# Patient Record
Sex: MALE | Race: WHITE | NOT HISPANIC OR LATINO | Employment: FULL TIME | URBAN - METROPOLITAN AREA
[De-identification: names, ages, dates, MRNs, and addresses within clinical notes are randomized per-mention and may not be internally consistent; named-entity substitution may affect disease eponyms.]

---

## 2017-04-27 ENCOUNTER — MYC MEDICAL ADVICE (OUTPATIENT)
Dept: PEDIATRICS | Facility: CLINIC | Age: 47
End: 2017-04-27

## 2017-04-27 NOTE — TELEPHONE ENCOUNTER
Appointment advised to evaluate cough.  Patient has not been seen in clinic in over one year-Feb/2016.  NEIDA Taylor RN     Potassium severely low at 2.7.  Likely from the vomiting.  Repleted with K-rider.  Will recheck tomorrow.

## 2017-08-03 ENCOUNTER — OFFICE VISIT (OUTPATIENT)
Dept: PEDIATRICS | Facility: CLINIC | Age: 47
End: 2017-08-03
Payer: COMMERCIAL

## 2017-08-03 VITALS
WEIGHT: 202 LBS | TEMPERATURE: 98 F | HEART RATE: 64 BPM | BODY MASS INDEX: 25.93 KG/M2 | SYSTOLIC BLOOD PRESSURE: 114 MMHG | HEIGHT: 74 IN | DIASTOLIC BLOOD PRESSURE: 72 MMHG

## 2017-08-03 DIAGNOSIS — C44.91: ICD-10-CM

## 2017-08-03 DIAGNOSIS — N50.819 ORCHIALGIA: ICD-10-CM

## 2017-08-03 DIAGNOSIS — R51.9 FACIAL PAIN: Primary | ICD-10-CM

## 2017-08-03 PROCEDURE — 99214 OFFICE O/P EST MOD 30 MIN: CPT | Performed by: INTERNAL MEDICINE

## 2017-08-03 NOTE — PATIENT INSTRUCTIONS
INSTRUCTIONS FOR TODAY:     resume PT exercises for posture       suspect groin pain related to running, wear briefs for support, try reducing mileage for the next week then gradually resume     Dr Neff

## 2017-08-03 NOTE — NURSING NOTE
"Chief Complaint   Patient presents with     Fatigue       Initial /72 (Cuff Size: Adult Regular)  Pulse 64  Temp 98  F (36.7  C) (Oral)  Ht 6' 2\" (1.88 m)  Wt 202 lb (91.6 kg)  BMI 25.94 kg/m2 Estimated body mass index is 25.94 kg/(m^2) as calculated from the following:    Height as of this encounter: 6' 2\" (1.88 m).    Weight as of this encounter: 202 lb (91.6 kg).  Medication Reconciliation: tatyana El CMA    "

## 2017-08-03 NOTE — MR AVS SNAPSHOT
"              After Visit Summary   8/3/2017    Manny Pitt    MRN: [id number]           Patient Information     Date Of Birth          1970        Visit Information        Provider Department      8/3/2017 1:00 PM [facility]        Care Instructions    INSTRUCTIONS FOR TODAY:     resume PT exercises for posture       suspect groin pain related to running, wear briefs for support, try reducing mileage for the next week then gradually resume     Dr Neff            Follow-ups after your visit        Who to contact     If you have questions or need follow up information about today's clinic visit or your schedule please contact [facility] directly at [phone].  Normal or non-critical lab and imaging results will be communicated to you by Keldelicehart, letter or phone within 4 business days after the clinic has received the results. If you do not hear from us within 7 days, please contact the clinic through Urban Internst or phone. If you have a critical or abnormal lab result, we will notify you by phone as soon as possible.  Submit refill requests through Yoics or call your pharmacy and they will forward the refill request to us. Please allow 3 business days for your refill to be completed.          Additional Information About Your Visit        MyChart Information     Yoics gives you secure access to your electronic health record. If you see a primary care provider, you can also send messages to your care team and make appointments. If you have questions, please call your primary care clinic.  If you do not have a primary care provider, please call [phone] and they will assist you.        Care EveryWhere ID     This is your Care EveryWhere ID. This could be used by other organizations to access your [facility] medical records  [id number]        Your Vitals Were     Pulse Temperature Height BMI (Body Mass Index)          64 98  F (36.7  C) (Oral) 6' 2\" (1.88 m) " 25.94 kg/m2         Blood Pressure from Last 3 Encounters:   08/03/17 114/72   12/20/16 (!) 135/95   02/29/16 128/84    Weight from Last 3 Encounters:   08/03/17 202 lb (91.6 kg)   12/20/16 195 lb (88.5 kg)   02/29/16 200 lb (90.7 kg)              Today, you had the following     No orders found for display         Today's Medication Changes          These changes are accurate as of: 8/3/17  1:31 PM.  If you have any questions, ask your nurse or doctor.               Stop taking these medicines if you haven't already. Please contact your care team if you have questions.     triamcinolone 0.1 % cream   Commonly known as:  KENALOG   Stopped by:  Ranulfo Neff MD                    Primary Care Provider Office Phone # Fax #    Ranulfo Neff [phone]       [facility] [address] [place] [address] [address] [place] [address]        Equal Access to Services     [facility] AH: Hadii aad ku hadasho Soomaali, waaxda luqadaha, qaybta kaalmada adeegyada, waxay idiin hayemanueln lazara escudero . So [facility] [phone].    ATENCIÓN: Si habla español, tiene a romero disposición servicios gratuitos de asistencia lingüística. Llame al [phone].    We comply with applicable federal civil rights laws and [place] laws. We do not discriminate on the basis of race, color, national origin, age, disability sex, sexual orientation or gender identity.            Thank you!     Thank you for choosing [facility]  for your care. Our goal is always to provide you with excellent care. Hearing back from our patients is one way we can continue to improve our services. Please take a few minutes to complete the written survey that you may receive in the mail after your visit with us. Thank you!             Your Updated Medication List - Protect others around you: Learn how to safely use, store and throw away your medicines at www.disposemymeds.org.          This list is accurate as of: 8/3/17  1:31 PM.   Always use your most recent med list.                   Brand Name Dispense Instructions for use Diagnosis    KRILL OIL PO           Multi-vitamin Tabs tablet      Take 1 tablet by mouth daily.        psyllium Packet    METAMUCIL/KONSYL     Take 1 packet by mouth daily        TYLENOL PO

## 2017-08-03 NOTE — PROGRESS NOTES
SUBJECTIVE:                                                    Manny Pitt is a 47 year old male who presents to clinic today for the following health issues:    47-year-old male presents today for evaluation of recurrent facial discomfort and groin/testicular pain. Facial pain has been a recurrent issue, complains of associated paresthesias over his right cheek. Prior evaluation-patient was managed with physical therapy. Patient notes that when he continues physical therapy stretches and postural exercises that the symptoms tend to resolve. However has not continued the postural stretches since then.        Additional complaints of right groin/testicular pain. Denies dysuria or hematuria. History of similar pain previously secondary to running. Patient has been training for a marathon running as much as 10 or 12 miles once or twice per week building mileage over the past few weeks. With the increasing mileage has noted increasing testicular discomfort.      History of recurrent basal cell cancer. Most recent basal cell resection over forehead /patient follows up with dermatology approximately every 6 months.        Patient Active Problem List   Diagnosis     Hyperlipidemia LDL goal <160     Recurrent basal cell carcinoma of skin     Onychomycosis     Hemorrhoids, unspecified hemorrhoid type     Past Surgical History:   Procedure Laterality Date     C NONSPECIFIC PROCEDURE      Hernia     COLONOSCOPY  2007     COLONOSCOPY  12/26/2012    Procedure: COLONOSCOPY;  Colonoscopy ;  Surgeon: Ranulfo Ramirez MD;  Location:  GI     COLONOSCOPY N/A 12/20/2016    Procedure: COLONOSCOPY;  Surgeon: Paula Johnson MD;  Location:  GI       Social History   Substance Use Topics     Smoking status: Never Smoker     Smokeless tobacco: Never Used     Alcohol use Yes      Comment: 2-3 drinks per week     Family History   Problem Relation Age of Onset     CANCER Maternal Grandmother      basal cell     CEREBROVASCULAR  "DISEASE Maternal Grandmother      CANCER Maternal Grandfather      basal cell     CEREBROVASCULAR DISEASE Paternal Grandmother      Alcohol/Drug Paternal Grandfather      Hypertension Mother      HEART DISEASE Father      anuerysm     Hypertension Father      CANCER Father      lung     DIABETES No family hx of      Colon Cancer No family hx of          Current Outpatient Prescriptions   Medication Sig Dispense Refill     KRILL OIL PO        psyllium (METAMUCIL/KONSYL) Packet Take 1 packet by mouth daily       multivitamin, therapeutic with minerals (MULTI-VITAMIN) TABS Take 1 tablet by mouth daily.       Acetaminophen (TYLENOL PO)          ROS: The following systems have been completely reviewed and are negative except as noted in the HPI: CONSTITUTIONAL, HEAD AND NECK, CARDIOVASCULAR GENITOURINARY, PSYCHIATRIC.     OBJECTIVE:                                                    /72 (Cuff Size: Adult Regular)  Pulse 64  Temp 98  F (36.7  C) (Oral)  Ht 6' 2\" (1.88 m)  Wt 202 lb (91.6 kg)  BMI 25.94 kg/m2 Body mass index is 25.94 kg/(m^2).  GENERAL:  alert,  no distress  HENT: ear canals- normal; TMs- normal; oropharynx-normal  NECK: no tenderness, no adenopathy  RESP: lungs clear to auscultation - no rales, no rhonchi, no wheezes  CV: regular rates and rhythm, normal S1 S2  Genitourinary: Left-sided varicocele/left testis normal, right testis without erythema or swelling, nontender, hernia check negative  MS: extremities- no edema     ASSESSMENT/PLAN:                                                        ICD-10-CM    1. Facial pain R51   right-sided facial discomfort and associated paresthesias. Recurrent symptoms. Recommended resume prior PET/postural exercises which have been effective previously.      2. Orchialgia N50.819  normal exam. Suspect testicular discomfort is referred pain associated with musculoskeletal groin discomfort associated with high mileage running. Recommended period of rest, " substitute low impact exercise, gradual return to activity      3. Recurrent basal cell carcinoma of skin C44.91  unusual history of recurrent basal cell for several years, followed by dermatology every 6 months.  Etiology unclear-no known exposures /continue preventive measures-spf 50 sunscreen         Ranulfo Neff MD  [facility]

## 2017-10-31 ENCOUNTER — DOCUMENTATION ONLY (OUTPATIENT)
Dept: LAB | Facility: CLINIC | Age: 47
End: 2017-10-31

## 2017-10-31 DIAGNOSIS — E78.5 HYPERLIPIDEMIA LDL GOAL <160: Primary | ICD-10-CM

## 2017-11-03 DIAGNOSIS — E78.5 HYPERLIPIDEMIA LDL GOAL <160: ICD-10-CM

## 2017-11-03 LAB
ALBUMIN SERPL-MCNC: 3.8 G/DL (ref 3.4–5)
ALP SERPL-CCNC: 73 U/L (ref 40–150)
ALT SERPL W P-5'-P-CCNC: 28 U/L (ref 0–70)
ANION GAP SERPL CALCULATED.3IONS-SCNC: 8 MMOL/L (ref 3–14)
AST SERPL W P-5'-P-CCNC: 15 U/L (ref 0–45)
BILIRUB SERPL-MCNC: 0.5 MG/DL (ref 0.2–1.3)
BUN SERPL-MCNC: 12 MG/DL (ref 7–30)
CALCIUM SERPL-MCNC: 9 MG/DL (ref 8.5–10.1)
CHLORIDE SERPL-SCNC: 105 MMOL/L (ref 94–109)
CHOLEST SERPL-MCNC: 239 MG/DL
CO2 SERPL-SCNC: 26 MMOL/L (ref 20–32)
CREAT SERPL-MCNC: 0.94 MG/DL (ref 0.66–1.25)
GFR SERPL CREATININE-BSD FRML MDRD: 86 ML/MIN/1.7M2
GLUCOSE SERPL-MCNC: 82 MG/DL (ref 70–99)
HDLC SERPL-MCNC: 50 MG/DL
LDLC SERPL CALC-MCNC: 155 MG/DL
NONHDLC SERPL-MCNC: 189 MG/DL
POTASSIUM SERPL-SCNC: 3.6 MMOL/L (ref 3.4–5.3)
PROT SERPL-MCNC: 7.8 G/DL (ref 6.8–8.8)
SODIUM SERPL-SCNC: 139 MMOL/L (ref 133–144)
TRIGL SERPL-MCNC: 169 MG/DL

## 2017-11-03 PROCEDURE — 80061 LIPID PANEL: CPT | Performed by: INTERNAL MEDICINE

## 2017-11-03 PROCEDURE — 80053 COMPREHEN METABOLIC PANEL: CPT | Performed by: INTERNAL MEDICINE

## 2017-11-03 PROCEDURE — 36415 COLL VENOUS BLD VENIPUNCTURE: CPT | Performed by: INTERNAL MEDICINE

## 2017-11-08 ENCOUNTER — OFFICE VISIT (OUTPATIENT)
Dept: PEDIATRICS | Facility: CLINIC | Age: 47
End: 2017-11-08
Payer: COMMERCIAL

## 2017-11-08 VITALS
BODY MASS INDEX: 25.93 KG/M2 | WEIGHT: 202 LBS | HEIGHT: 74 IN | TEMPERATURE: 98.9 F | HEART RATE: 68 BPM | DIASTOLIC BLOOD PRESSURE: 84 MMHG | SYSTOLIC BLOOD PRESSURE: 138 MMHG

## 2017-11-08 DIAGNOSIS — Z00.01 ENCOUNTER FOR ROUTINE ADULT HEALTH EXAMINATION WITH ABNORMAL FINDINGS: Primary | ICD-10-CM

## 2017-11-08 DIAGNOSIS — E78.5 HYPERLIPIDEMIA LDL GOAL <160: ICD-10-CM

## 2017-11-08 DIAGNOSIS — T75.3XXD MOTION SICKNESS, SUBSEQUENT ENCOUNTER: ICD-10-CM

## 2017-11-08 DIAGNOSIS — C44.91: ICD-10-CM

## 2017-11-08 DIAGNOSIS — R35.0 URINARY FREQUENCY: ICD-10-CM

## 2017-11-08 PROCEDURE — 99213 OFFICE O/P EST LOW 20 MIN: CPT | Mod: 25 | Performed by: INTERNAL MEDICINE

## 2017-11-08 PROCEDURE — 99396 PREV VISIT EST AGE 40-64: CPT | Performed by: INTERNAL MEDICINE

## 2017-11-08 RX ORDER — ATORVASTATIN CALCIUM 10 MG/1
10 TABLET, FILM COATED ORAL DAILY
Qty: 90 TABLET | Refills: 3 | Status: SHIPPED | OUTPATIENT
Start: 2017-11-08 | End: 2017-11-22 | Stop reason: SINTOL

## 2017-11-08 RX ORDER — MECLIZINE HYDROCHLORIDE 25 MG/1
25 TABLET ORAL 3 TIMES DAILY PRN
Qty: 60 TABLET | Refills: 3 | Status: SHIPPED | OUTPATIENT
Start: 2017-11-08 | End: 2018-11-26

## 2017-11-08 NOTE — PROGRESS NOTES
SUBJECTIVE:   CC: Manny Pitt is an 47 year old male who presents for preventative health visit.     Physical   Annual:     Getting at least 3 servings of Calcium per day::  Yes    Bi-annual eye exam::  Yes    Dental care twice a year::  Yes    Sleep apnea or symptoms of sleep apnea::  None    Taking medications regularly::  Yes    Medication side effects::  Other    Additional concerns today::  YES    1- hyperlipidemia.   H/o hyperlipidemia, little improvement with diet changes.  Family hx of hyperlipidemia.   Diet has been worse lately  Lab Results   Component Value Date     11/03/2017     09/23/2016   2- URI, dry cough for the past week  3- urinary frequency for more than 10 years.  Needs to urinate every 1-2 hours during the day, does not sleep through the night due to sx    Today's PHQ-2 Score:   PHQ-2 ( 1999 Pf[employer]) 11/8/2017   Q1: Little interest or pleasure in doing things 1   Q2: Feeling down, depressed or hopeless 0   PHQ-2 Score 1   Q1: Little interest or pleasure in doing things Several days   Q2: Feeling down, depressed or hopeless Not at all   PHQ-2 Score 1       Abuse: Current or Past(Physical, Sexual or Emotional)- No  Do you feel safe in your environment - Yes    Social History   Substance Use Topics     Smoking status: Never Smoker     Smokeless tobacco: Never Used     Alcohol use Yes      Comment: 2-3 drinks per week     The patient does not drink >3 drinks per day nor >7 drinks per week.    Last PSA: No results found for: PSA    Reviewed orders with patient. Reviewed health maintenance and updated orders accordingly - Yes    Reviewed and updated as needed this visit by clinical staff  Tobacco  Allergies  Meds       Patient Active Problem List   Diagnosis     Hyperlipidemia LDL goal <160     Recurrent basal cell carcinoma of skin     Onychomycosis     Hemorrhoids, unspecified hemorrhoid type     Past Medical History:   Diagnosis Date     Esophageal reflux      Other malignant  "neoplasm of skin of lower limb, including hip     BASAL CELL CA S/P RESECTION 2003     Other malignant neoplasm of skin of trunk, except scrotum     BASAL CELL CA S/P RESECTION 2003     Personal history of other disorders of nervous system and sense organs      Unspecified eustachian tube disorder        Past Surgical History:   Procedure Laterality Date     C NONSPECIFIC PROCEDURE      Hernia     COLONOSCOPY  2007     COLONOSCOPY  12/26/2012    Procedure: COLONOSCOPY;  Colonoscopy ;  Surgeon: Ranulfo Ramirez MD;  Location:  GI     COLONOSCOPY N/A 12/20/2016    Procedure: COLONOSCOPY;  Surgeon: Paula Johnson MD;  Location:  GI       Family History   Problem Relation Age of Onset     CANCER Maternal Grandmother      basal cell     CEREBROVASCULAR DISEASE Maternal Grandmother      CANCER Maternal Grandfather      basal cell     CEREBROVASCULAR DISEASE Paternal Grandmother      Alcohol/Drug Paternal Grandfather      Hypertension Mother      HEART DISEASE Father      anuerysm     Hypertension Father      CANCER Father      lung     DIABETES No family hx of      Colon Cancer No family hx of        ALLERGIES     Allergies   Allergen Reactions     Erythromycin Anaphylaxis     Doxycycline      Severe headache       Review of Systems  C: NEGATIVE for fever, chills, change in weight  I: recent basal cell resction  E: NEGATIVE for vision changes or irritation  ENT: NEGATIVE for ear, mouth and throat problems  R: NEGATIVE for significant cough or SOB  CV: NEGATIVE for chest pain, palpitations or peripheral edema  GI: NEGATIVE for nausea, abdominal pain, heartburn, or change in bowel habits   male: see hpi  M: NEGATIVE for significant arthralgias or myalgia  N: NEGATIVE for weakness, dizziness or paresthesias  P: NEGATIVE for changes in mood or affect    OBJECTIVE:   /84 (Cuff Size: Adult Large)  Pulse 68  Temp 98.9  F (37.2  C) (Oral)  Ht 6' 2\" (1.88 m)  Wt 202 lb (91.6 kg)  BMI 25.94 " kg/m2    Physical Exam  GENERAL: healthy, alert and no distress  EYES: Eyes grossly normal to inspection, PERRL and conjunctivae and sclerae normal  HENT: ear canals and TM's normal, nose and mouth without ulcers or lesions  NECK: no adenopathy, no asymmetry, masses, or scars and thyroid normal to palpation  RESP: lungs clear to auscultation - no rales, rhonchi or wheezes  CV: regular rate and rhythm, normal S1 S2, no S3 or S4, no murmur, click or rub, no peripheral edema and peripheral pulses strong  ABDOMEN: soft, nontender, no hepatosplenomegaly, no masses and bowel sounds normal  MS: no gross musculoskeletal defects noted, no edema  SKIN: healing surgical wound midchest  NEURO: Normal strength and tone, mentation intact and speech normal  PSYCH: mentation appears normal, affect normal/bright  Prostate: normal exam    ASSESSMENT/PLAN:       ICD-10-CM    1. Encounter for routine adult health examination with abnormal findings Z00.01        2. Hyperlipidemia LDL goal <160 E78.5 atorvastatin (LIPITOR) 10 MG tablet     Lipid panel reflex to direct LDL Fasting     10 yr risk reviewed.  Pt elects to start statin--Medication side effects discussed. Return for fasting labwork 2 months     3. Recurrent basal cell carcinoma of skin C44.91 Followed by Dermatology, recent Moh's procedure     4. Motion sickness, subsequent encounter T75.3XXD meclizine (ANTIVERT) 25 MG tablet      Requests meclizine refill for motion sickness when travelling     5. Urinary frequency R35.0 UROLOGY ADULT REFERRAL    Prostate exam today normal.   ?OAB, referred to Urology for urodynamics/additional testing         COUNSELING:   Reviewed preventive health counseling, as reflected in patient instructions       Regular exercise       Healthy diet/nutrition       Colon cancer screening       Prostate cancer screening    BP Screening:   Last 3 BP Readings:    BP Readings from Last 3 Encounters:   11/08/17 138/84   08/03/17 114/72   12/20/16 (!) 135/95  "       reports that he has never smoked. He has never used smokeless tobacco.      Estimated body mass index is 25.94 kg/(m^2) as calculated from the following:    Height as of this encounter: 6' 2\" (1.88 m).    Weight as of this encounter: 202 lb (91.6 kg).         Counseling Resources:  ATP IV Guidelines  Pooled Cohorts Equation Calculator  FRAX Risk Assessment  ICSI Preventive Guidelines  Dietary Guidelines for Americans, 2010  Koko's MyPlate  ASA Prophylaxis  Lung CA Screening    Ranulfo Neff MD, MD  [facility]    The 10-year ASCVD risk score (Keotajosephine WEST Jr, et al., 2013) is: 4.6%    Values used to calculate the score:      Age: 47 years      Sex: Male      Is Non- : No      Diabetic: No      Tobacco smoker: No      Systolic Blood Pressure: 138 mmHg      Is BP treated: No      HDL Cholesterol: 50 mg/dL      Total Cholesterol: 239 mg/dL    "

## 2017-11-08 NOTE — MR AVS SNAPSHOT
After Visit Summary   11/8/2017    Manny Pitt    MRN: [id number]           Patient Information     Date Of Birth          1970        Visit Information        Provider Department      11/8/2017 7:40 AM Ranulfo Neff MD [facility]        Today's Diagnoses     Encounter for routine adult health examination with abnormal findings    -  1    Hyperlipidemia LDL goal <160        Recurrent basal cell carcinoma of skin        Motion sickness, subsequent encounter          Care Instructions    INSTRUCTIONS FOR TODAY:     start atorvastatin   return for fasting labwork in 2 months     Dr Neff    Preventive Health Recommendations  Male Ages 40 to 49    Yearly exam:             See your health care provider every year in order to  o   Review health changes.   o   Discuss preventive care.    o   Review your medicines if your doctor has prescribed any.    You should be tested each year for STDs (sexually transmitted diseases) if you re at risk.     Have a cholesterol test every 5 years.     Have a colonoscopy (test for colon cancer) if someone in your family has had colon cancer or polyps before age 50.     After age 45, have a diabetes test (fasting glucose). If you are at risk for diabetes, you should have this test every 3 years.      Talk with your health care provider about whether or not a prostate cancer screening test (PSA) is right for you.    Shots: Get a flu shot each year. Get a tetanus shot every 10 years.     Nutrition:    Eat at least 5 servings of fruits and vegetables daily.     Eat whole-grain bread, whole-wheat pasta and brown rice instead of white grains and rice.     Talk to your provider about Calcium and Vitamin D.     Lifestyle    Exercise for at least 150 minutes a week (30 minutes a day, 5 days a week). This will help you control your weight and prevent disease.     Limit alcohol to one drink per day.     No smoking.     Wear sunscreen to prevent skin cancer.  "    See your dentist every six months for an exam and cleaning.              Follow-ups after your visit        Future tests that were ordered for you today     Open Future Orders        Priority Expected Expires Ordered    Lipid panel reflex to direct LDL Fasting Routine  1/8/2018 11/8/2017    **ALT FUTURE 2mo Routine 1/7/2018 3/8/2018 11/8/2017            Who to contact     If you have questions or need follow up information about today's clinic visit or your schedule please contact [facility] [place] directly at [phone].  Normal or non-critical lab and imaging results will be communicated to you by TimZonhart, letter or phone within 4 business days after the clinic has received the results. If you do not hear from us within 7 days, please contact the clinic through Heydayt or phone. If you have a critical or abnormal lab result, we will notify you by phone as soon as possible.  Submit refill requests through TPACK or call your pharmacy and they will forward the refill request to us. Please allow 3 business days for your refill to be completed.          Additional Information About Your Visit        TimZonharIDEAglobal Information     TPACK gives you secure access to your electronic health record. If you see a primary care provider, you can also send messages to your care team and make appointments. If you have questions, please call your primary care clinic.  If you do not have a primary care provider, please call [phone] and they will assist you.        Care EveryWhere ID     This is your Care EveryWhere ID. This could be used by other organizations to access your [place] medical records  [id number]        Your Vitals Were     Pulse Temperature Height BMI (Body Mass Index)          68 98.9  F (37.2  C) (Oral) 6' 2\" (1.88 m) 25.94 kg/m2         Blood Pressure from Last 3 Encounters:   11/08/17 138/84   08/03/17 114/72   12/20/16 (!) 135/95    Weight from Last 3 Encounters:   11/08/17 202 lb (91.6 kg) "   08/03/17 202 lb (91.6 kg)   12/20/16 195 lb (88.5 kg)                 Today's Medication Changes          These changes are accurate as of: 11/8/17  8:11 AM.  If you have any questions, ask your nurse or doctor.               Start taking these medicines.        Dose/Directions    atorvastatin 10 MG tablet   Commonly known as:  LIPITOR   Used for:  Hyperlipidemia LDL goal <160   Started by:  Ranulfo Neff MD        Dose:  10 mg   Take 1 tablet (10 mg) by mouth daily   Quantity:  90 tablet   Refills:  3       meclizine 25 MG tablet   Commonly known as:  ANTIVERT   Used for:  Motion sickness, subsequent encounter   Started by:  Ranulfo Neff MD        Dose:  25 mg   Take 1 tablet (25 mg) by mouth 3 times daily as needed   Quantity:  60 tablet   Refills:  3            Where to get your medicines      These medications were sent to [facility]/pharmacy #[id number] - [place], [place] - [address] AT [address] [place], [place] [address]     Phone:  [phone]     atorvastatin 10 MG tablet    meclizine 25 MG tablet                Primary Care Provider Office Phone # Fax #    Ranulfo Neff [phone]       [facility] [place] [address]        Equal Access to Services     [facility] AH: Hadii ruma ku hadasho Soomaali, waaxda luqadaha, qaybta kaalmada adeegyada, waxay dereck haynelli doe. So [facility] [phone].    ATENCIÓN: Si habla español, tiene a romero disposición servicios gratuitos de asistencia lingüística. Llame al [phone].    We comply with applicable federal civil rights laws and [place] laws. We do not discriminate on the basis of race, color, national origin, age, disability, sex, sexual orientation, or gender identity.            Thank you!     [facility]. Our goal is always to provide you with excellent care. Hearing back from our patients is one way we can continue  to improve our services. Please take a few minutes to complete the written survey that you may receive in the mail after your visit with us. Thank you!             Your Updated Medication List - Protect others around you: Learn how to safely use, store and throw away your medicines at www.disposemymeds.org.          This list is accurate as of: 11/8/17  8:11 AM.  Always use your most recent med list.                   Brand Name Dispense Instructions for use Diagnosis    atorvastatin 10 MG tablet    LIPITOR    90 tablet    Take 1 tablet (10 mg) by mouth daily    Hyperlipidemia LDL goal <160       KRILL OIL PO           meclizine 25 MG tablet    ANTIVERT    60 tablet    Take 1 tablet (25 mg) by mouth 3 times daily as needed    Motion sickness, subsequent encounter       Multi-vitamin Tabs tablet      Take 1 tablet by mouth daily.        psyllium Packet    METAMUCIL/KONSYL     Take 1 packet by mouth daily        TYLENOL PO

## 2017-11-08 NOTE — PATIENT INSTRUCTIONS
INSTRUCTIONS FOR TODAY:     start atorvastatin   return for fasting labwork in 2 months     Dr Neff    Preventive Health Recommendations  Male Ages 40 to 49    Yearly exam:             See your health care provider every year in order to  o   Review health changes.   o   Discuss preventive care.    o   Review your medicines if your doctor has prescribed any.    You should be tested each year for STDs (sexually transmitted diseases) if you re at risk.     Have a cholesterol test every 5 years.     Have a colonoscopy (test for colon cancer) if someone in your family has had colon cancer or polyps before age 50.     After age 45, have a diabetes test (fasting glucose). If you are at risk for diabetes, you should have this test every 3 years.      Talk with your health care provider about whether or not a prostate cancer screening test (PSA) is right for you.    Shots: Get a flu shot each year. Get a tetanus shot every 10 years.     Nutrition:    Eat at least 5 servings of fruits and vegetables daily.     Eat whole-grain bread, whole-wheat pasta and brown rice instead of white grains and rice.     Talk to your provider about Calcium and Vitamin D.     Lifestyle    Exercise for at least 150 minutes a week (30 minutes a day, 5 days a week). This will help you control your weight and prevent disease.     Limit alcohol to one drink per day.     No smoking.     Wear sunscreen to prevent skin cancer.     See your dentist every six months for an exam and cleaning.

## 2017-11-08 NOTE — NURSING NOTE
"Chief Complaint   Patient presents with     Physical       Initial /84 (Cuff Size: Adult Large)  Pulse 68  Temp 98.9  F (37.2  C) (Oral)  Ht 6' 2\" (1.88 m)  Wt 202 lb (91.6 kg)  BMI 25.94 kg/m2 Estimated body mass index is 25.94 kg/(m^2) as calculated from the following:    Height as of this encounter: 6' 2\" (1.88 m).    Weight as of this encounter: 202 lb (91.6 kg).  Medication Reconciliation: tatyana El CMA    "

## 2017-11-09 ASSESSMENT — ENCOUNTER SYMPTOMS
COUGH DISTURBING SLEEP: 1
POSTURAL DYSPNEA: 0
TASTE DISTURBANCE: 0
SINUS CONGESTION: 1
DYSURIA: 0
DIFFICULTY URINATING: 1
HEMATURIA: 0
TROUBLE SWALLOWING: 1
NAIL CHANGES: 0
DYSPNEA ON EXERTION: 0
SWOLLEN GLANDS: 0
POOR WOUND HEALING: 0
BRUISES/BLEEDS EASILY: 0
SNORES LOUDLY: 1
NECK MASS: 0
SHORTNESS OF BREATH: 0
HOARSE VOICE: 0
COUGH: 1
SINUS PAIN: 1
SPUTUM PRODUCTION: 1
WHEEZING: 0
FLANK PAIN: 0
SORE THROAT: 1
HEMOPTYSIS: 0
SKIN CHANGES: 0
SMELL DISTURBANCE: 0

## 2017-11-12 ENCOUNTER — MYC MEDICAL ADVICE (OUTPATIENT)
Dept: PEDIATRICS | Facility: CLINIC | Age: 47
End: 2017-11-12

## 2017-11-12 DIAGNOSIS — E78.5 HYPERLIPIDEMIA LDL GOAL <160: Primary | ICD-10-CM

## 2017-11-20 ENCOUNTER — OFFICE VISIT (OUTPATIENT)
Dept: UROLOGY | Facility: CLINIC | Age: 47
End: 2017-11-20
Payer: COMMERCIAL

## 2017-11-20 VITALS
BODY MASS INDEX: 25.67 KG/M2 | DIASTOLIC BLOOD PRESSURE: 90 MMHG | WEIGHT: 200 LBS | HEIGHT: 74 IN | SYSTOLIC BLOOD PRESSURE: 146 MMHG | HEART RATE: 83 BPM | OXYGEN SATURATION: 97 %

## 2017-11-20 DIAGNOSIS — N40.1 BENIGN PROSTATIC HYPERPLASIA WITH NOCTURIA: ICD-10-CM

## 2017-11-20 DIAGNOSIS — Z12.5 SCREENING FOR PROSTATE CANCER: ICD-10-CM

## 2017-11-20 DIAGNOSIS — R35.1 BENIGN PROSTATIC HYPERPLASIA WITH NOCTURIA: ICD-10-CM

## 2017-11-20 DIAGNOSIS — R35.0 URINARY FREQUENCY: Primary | ICD-10-CM

## 2017-11-20 LAB
ALBUMIN UR-MCNC: NEGATIVE MG/DL
APPEARANCE UR: CLEAR
BILIRUB UR QL STRIP: NEGATIVE
COLOR UR AUTO: YELLOW
GLUCOSE UR STRIP-MCNC: NEGATIVE MG/DL
HGB UR QL STRIP: NEGATIVE
KETONES UR STRIP-MCNC: NEGATIVE MG/DL
LEUKOCYTE ESTERASE UR QL STRIP: NEGATIVE
NITRATE UR QL: NEGATIVE
PH UR STRIP: 7.5 PH (ref 5–7)
PSA SERPL-MCNC: 2.6 NG/ML (ref 0–4)
RESIDUAL VOLUME (RV) (EXTERNAL): 40
SOURCE: ABNORMAL
SP GR UR STRIP: 1.02 (ref 1–1.03)
UROBILINOGEN UR STRIP-ACNC: 0.2 EU/DL (ref 0.2–1)

## 2017-11-20 PROCEDURE — 51798 US URINE CAPACITY MEASURE: CPT | Performed by: PHYSICIAN ASSISTANT

## 2017-11-20 PROCEDURE — 36415 COLL VENOUS BLD VENIPUNCTURE: CPT | Performed by: PHYSICIAN ASSISTANT

## 2017-11-20 PROCEDURE — 99214 OFFICE O/P EST MOD 30 MIN: CPT | Mod: 25 | Performed by: PHYSICIAN ASSISTANT

## 2017-11-20 PROCEDURE — 81003 URINALYSIS AUTO W/O SCOPE: CPT | Performed by: PHYSICIAN ASSISTANT

## 2017-11-20 PROCEDURE — 84153 ASSAY OF PSA TOTAL: CPT | Performed by: PHYSICIAN ASSISTANT

## 2017-11-20 RX ORDER — TAMSULOSIN HYDROCHLORIDE 0.4 MG/1
0.4 CAPSULE ORAL DAILY
Qty: 90 CAPSULE | Refills: 1 | Status: SHIPPED | OUTPATIENT
Start: 2017-11-20 | End: 2018-03-20

## 2017-11-20 ASSESSMENT — PAIN SCALES - GENERAL: PAINLEVEL: NO PAIN (0)

## 2017-11-20 NOTE — MR AVS SNAPSHOT
After Visit Summary   11/20/2017    Manny Pitt    MRN: [id number]           Patient Information     Date Of Birth          1970        Visit Information        Provider Department      11/20/2017 1:30 PM Berna Tee PA-C [facility]        Today's Diagnoses     Urinary frequency    -  1    Benign prostatic hyperplasia with nocturia        Screening for prostate cancer          Care Instructions    Check PSA level today.    Start Flomax (I sent this to your pharmacy).    Below is a list of things that can irritate the bladder/prostate and should be avoided:      Caffeinated soft drinks.    Coffee.    Tea.    Chocolate.    Tomato-based foods.    Acidic juices and fruits. (includes cranberry juice)    Alcohol.    Carbonated drinks.    Aspartame/Nutrasweet.              Follow-ups after your visit        Follow-up notes from your care team     Return in about 3 months (around 2/20/2018).      Your next 10 appointments already scheduled     Feb 27, 2018  3:00 PM CST   Return Visit with Berna Tee PA-C   [facility] ([facility])    [address]  [address]  [facility] [id number] [phone]              Who to contact     If you have questions or need follow up information about today's clinic visit or your schedule please contact [facility] directly at [phone].  Normal or non-critical lab and imaging results will be communicated to you by MyChart, letter or phone within 4 business days after the clinic has received the results. If you do not hear from us within 7 days, please contact the clinic through MyChart or phone. If you have a critical or abnormal lab result, we will notify you by phone as soon as possible.  Submit refill requests through [employer] or call your pharmacy and they will forward the refill request to us. Please allow 3  "business days for your refill to be completed.          Additional Information About Your Visit        MyChart Information     [employer] gives you secure access to your electronic health record. If you see a primary care provider, you can also send messages to your care team and make appointments. If you have questions, please call your primary care clinic.  If you do not have a primary care provider, please call [phone] and they will assist you.        Care EveryWhere ID     This is your Care EveryWhere ID. This could be used by other organizations to access your [place] medical records  [id number]        Your Vitals Were     Pulse Height Pulse Oximetry BMI (Body Mass Index)          83 1.88 m (6' 2\") 97% 25.68 kg/m2         Blood Pressure from Last 3 Encounters:   11/20/17 146/90   11/08/17 138/84   08/03/17 114/72    Weight from Last 3 Encounters:   11/20/17 90.7 kg (200 lb)   11/08/17 91.6 kg (202 lb)   08/03/17 91.6 kg (202 lb)              We Performed the Following     PSA Diag Urologic Phys     UA without Microscopic          Today's Medication Changes          These changes are accurate as of: 11/20/17  2:11 PM.  If you have any questions, ask your nurse or doctor.               Start taking these medicines.        Dose/Directions    tamsulosin 0.4 MG capsule   Commonly known as:  FLOMAX   Used for:  Benign prostatic hyperplasia with nocturia   Started by:  Berna Tee PA-C        Dose:  0.4 mg   Take 1 capsule (0.4 mg) by mouth daily   Quantity:  90 capsule   Refills:  1            Where to get your medicines      These medications were sent to [facility]/pharmacy #[id number] - [place], [place] - [address] AT [place] [address], [place] [address]     Phone:  [phone]     tamsulosin 0.4 MG capsule                Primary Care Provider Office Phone # Fax #    Ranulfo Neff [phone]       [facility] [place] [address]      "   Equal Access to Services     TOMA HAYES : Hadii ruma valenzuela paxtoncarlota Marshallali, walyndseyda luqadaha, qaybta kaalmasancho weber. So [facility] [phone].    ATENCIÓN: Si habla español, tiene a romero disposición servicios gratuitos de asistencia lingüística. Monishaame al [phone].    We comply with applicable federal civil rights laws and [place] laws. We do not discriminate on the basis of race, color, national origin, age, disability, sex, sexual orientation, or gender identity.            Thank you!     Thank you for choosing [facility] [place]  for your care. Our goal is always to provide you with excellent care. Hearing back from our patients is one way we can continue to improve our services. Please take a few minutes to complete the written survey that you may receive in the mail after your visit with us. Thank you!             Your Updated Medication List - Protect others around you: Learn how to safely use, store and throw away your medicines at www.disposemymeds.org.          This list is accurate as of: 11/20/17  2:11 PM.  Always use your most recent med list.                   Brand Name Dispense Instructions for use Diagnosis    atorvastatin 10 MG tablet    LIPITOR    90 tablet    Take 1 tablet (10 mg) by mouth daily    Hyperlipidemia LDL goal <160       KRILL OIL PO           meclizine 25 MG tablet    ANTIVERT    60 tablet    Take 1 tablet (25 mg) by mouth 3 times daily as needed    Motion sickness, subsequent encounter       Multi-vitamin Tabs tablet      Take 1 tablet by mouth daily.        psyllium Packet    METAMUCIL/KONSYL     Take 1 packet by mouth daily        tamsulosin 0.4 MG capsule    FLOMAX    90 capsule    Take 1 capsule (0.4 mg) by mouth daily    Benign prostatic hyperplasia with nocturia       TYLENOL PO

## 2017-11-20 NOTE — LETTER
11/20/2017     RE: Manny Pitt  [address] [place] [address]     Dear Colleague,    Thank you for referring your patient, Manny Pitt, to the [facility] [place] at [facility]. Please see a copy of my visit note below.    CC: Urinary frequency.    HPI: It is a pleasure to see . Manny Pitt, a pleasant 47 year old male seen today in the urology clinic in consultation from Dr. Neff for evaluation of the above. Symptoms have been ongoing for 10 years and are progressively worsening. The patient voids q1-2 hours during the day, nocturia x 1-4. He reports sensation of incomplete emptying. Denies dysuria, gross hematuria, pyuria, incontinence, needing to strain to void, intermittency, history of UTI's or kidney stones. No prior history of prostate or bladder issues.     Drinks several glasses of water daily, 1-2 cups of coffee early AM.    Past Medical History:   Diagnosis Date     Esophageal reflux      Other malignant neoplasm of skin of lower limb, including hip     BASAL CELL CA S/P RESECTION 2003     Other malignant neoplasm of skin of trunk, except scrotum     BASAL CELL CA S/P RESECTION 2003     Personal history of other disorders of nervous system and sense organs      Unspecified eustachian tube disorder      Past Surgical History:   Procedure Laterality Date     C NONSPECIFIC PROCEDURE      Hernia     COLONOSCOPY  2007     COLONOSCOPY  12/26/2012    Procedure: COLONOSCOPY;  Colonoscopy ;  Surgeon: Ranulfo Ramirez MD;  Location:  GI     COLONOSCOPY N/A 12/20/2016    Procedure: COLONOSCOPY;  Surgeon: Paula Johnson MD;  Location:  GI     Current Outpatient Prescriptions   Medication Sig Dispense Refill     meclizine (ANTIVERT) 25 MG tablet Take 1 tablet (25 mg) by mouth 3 times daily as needed 60 tablet 3     atorvastatin (LIPITOR) 10 MG tablet Take 1 tablet (10 mg) by mouth daily 90 tablet 3     KRILL OIL PO     "    psyllium (METAMUCIL/KONSYL) Packet Take 1 packet by mouth daily       Acetaminophen (TYLENOL PO)        multivitamin, therapeutic with minerals (MULTI-VITAMIN) TABS Take 1 tablet by mouth daily.       Allergies   Allergen Reactions     Erythromycin Anaphylaxis     Doxycycline      Severe headache     FAMILY HISTORY: No history of genitourinary carcinoma or urolithiasis.    SOCIAL HISTORY: The patient does not smoke cigarettes, Denies EtOH and illicit drug abuse.      ROS: A comprehensive 14 point ROS was obtained otherwise negative except for that outlined above in the HPI.    PHYSICAL EXAM:   /90 (BP Location: Left arm, Patient Position: Sitting, Cuff Size: Adult Large)  Pulse 83  Ht 1.88 m (6' 2\")  Wt 90.7 kg (200 lb)  SpO2 97%  BMI 25.68 kg/m2    GENERAL: Well groomed/well developed/well nourished male in NAD.  HEENT: EOMI, AT, NC.  SKIN: Warm to touch, dry.  No visible rashes or lesions.  RESP: No increased respiratory effort.  LYMPH: No LE edema.  ABD: Soft, NT, ND.  No CVAT.    MS: Full ROM in extremities.  CHINEDU: Deferred for check of PSA.  NEURO: Alert and oriented x 3.  PSYCH: Normal mood and affect, pleasant and agreeable during interview and exam.    Orders Only on 11/03/2017   Component Date Value Ref Range Status     Sodium 11/03/2017 139  133 - 144 mmol/L Final     Potassium 11/03/2017 3.6  3.4 - 5.3 mmol/L Final     Chloride 11/03/2017 105  94 - 109 mmol/L Final     Carbon Dioxide 11/03/2017 26  20 - 32 mmol/L Final     Anion Gap 11/03/2017 8  3 - 14 mmol/L Final     Glucose 11/03/2017 82  70 - 99 mg/dL Final    Fasting specimen     Urea Nitrogen 11/03/2017 12  7 - 30 mg/dL Final     Creatinine 11/03/2017 0.94  0.66 - 1.25 mg/dL Final     GFR Estimate 11/03/2017 86  >60 mL/min/1.7m2 Final    Non  GFR Calc     GFR Estimate If Black 11/03/2017 >90  >60 mL/min/1.7m2 Final    African American GFR Calc     Calcium 11/03/2017 9.0  8.5 - 10.1 mg/dL Final     Bilirubin Total " 11/03/2017 0.5  0.2 - 1.3 mg/dL Final     Albumin 11/03/2017 3.8  3.4 - 5.0 g/dL Final     Protein Total 11/03/2017 7.8  6.8 - 8.8 g/dL Final     Alkaline Phosphatase 11/03/2017 73  40 - 150 U/L Final     ALT 11/03/2017 28  0 - 70 U/L Final     AST 11/03/2017 15  0 - 45 U/L Final     Cholesterol 11/03/2017 239* <200 mg/dL Final    Desirable:       <200 mg/dl     Triglycerides 11/03/2017 169* <150 mg/dL Final    Comment: Borderline high:  150-199 mg/dl  High:             200-499 mg/dl  Very high:       >499 mg/dl  Fasting specimen       HDL Cholesterol 11/03/2017 50  >39 mg/dL Final     LDL Cholesterol Calculated 11/03/2017 155* <100 mg/dL Final    Comment: Above desirable:  100-129 mg/dl  Borderline High:  130-159 mg/dL  High:             160-189 mg/dL  Very high:       >189 mg/dl       Non HDL Cholesterol 11/03/2017 189* <130 mg/dL Final    Comment: Above Desirable:  130-159 mg/dl  Borderline high:  160-189 mg/dl  High:             190-219 mg/dl  Very high:       >219 mg/dl       No results found for: PSA    PVR: 40mL    ASSESSMENT/PLAN:  Mr. Manny Pitt is a 47 year old male with symptoms consistent with BPH w/ LUTS. Discussed potential management options with the patient including: double voiding, timed voiding, avoiding known bladder irritants, alpha blocker medication, 5 alpha reductase inhibitor medication, and possible outlet reductive surgery, such as a TURP, down the road.  The patient has elected to proceed with Meds.    - PSA today given very extended course of symptoms  - Trial Flomax. Rx sent to patient's pharmacy. Side effects discussed.  -Will consider OAB if no improvement with Flomax    Follow up in 3 months for CHINEDU, an AUA symptom score, PVR and reassessment.  Call or RTC sooner with any issues.     Video urodynamics and/or cystoscopic evaluation would be the next appropriate diagnostic steps should Mr. Pitt fail conservative therapy.      I have enjoyed participating in the medical care of  this pleasant patient.  Please don't hesitate to contact me with any questions or concerns.     Berna Tee PA-C  [facility]    30 minutes were spent with the patient today, > 50% in counseling and coordination of care.

## 2017-11-20 NOTE — PATIENT INSTRUCTIONS
Check PSA level today.    Start Flomax (I sent this to your pharmacy).    Below is a list of things that can irritate the bladder/prostate and should be avoided:      Caffeinated soft drinks.    Coffee.    Tea.    Chocolate.    Tomato-based foods.    Acidic juices and fruits. (includes cranberry juice)    Alcohol.    Carbonated drinks.    Aspartame/Nutrasweet.

## 2017-11-20 NOTE — PROGRESS NOTES
CC: Urinary frequency.    HPI: It is a pleasure to see . Manny Pitt, a pleasant 47 year old male seen today in the urology clinic in consultation from Dr. Neff for evaluation of the above. Symptoms have been ongoing for 10 years and are progressively worsening. The patient voids q1-2 hours during the day, nocturia x 1-4. He reports sensation of incomplete emptying. Denies dysuria, gross hematuria, pyuria, incontinence, needing to strain to void, intermittency, history of UTI's or kidney stones. No prior history of prostate or bladder issues.     Drinks several glasses of water daily, 1-2 cups of coffee early AM.    Past Medical History:   Diagnosis Date     Esophageal reflux      Other malignant neoplasm of skin of lower limb, including hip     BASAL CELL CA S/P RESECTION 2003     Other malignant neoplasm of skin of trunk, except scrotum     BASAL CELL CA S/P RESECTION 2003     Personal history of other disorders of nervous system and sense organs      Unspecified eustachian tube disorder      Past Surgical History:   Procedure Laterality Date     C NONSPECIFIC PROCEDURE      Hernia     COLONOSCOPY  2007     COLONOSCOPY  12/26/2012    Procedure: COLONOSCOPY;  Colonoscopy ;  Surgeon: Ranulfo Ramirez MD;  Location:  GI     COLONOSCOPY N/A 12/20/2016    Procedure: COLONOSCOPY;  Surgeon: Paula Johnson MD;  Location:  GI     Current Outpatient Prescriptions   Medication Sig Dispense Refill     meclizine (ANTIVERT) 25 MG tablet Take 1 tablet (25 mg) by mouth 3 times daily as needed 60 tablet 3     atorvastatin (LIPITOR) 10 MG tablet Take 1 tablet (10 mg) by mouth daily 90 tablet 3     KRILL OIL PO        psyllium (METAMUCIL/KONSYL) Packet Take 1 packet by mouth daily       Acetaminophen (TYLENOL PO)        multivitamin, therapeutic with minerals (MULTI-VITAMIN) TABS Take 1 tablet by mouth daily.       Allergies   Allergen Reactions     Erythromycin Anaphylaxis     Doxycycline      Severe headache  "    FAMILY HISTORY: No history of genitourinary carcinoma or urolithiasis.    SOCIAL HISTORY: The patient does not smoke cigarettes, Denies EtOH and illicit drug abuse.      ROS: A comprehensive 14 point ROS was obtained otherwise negative except for that outlined above in the HPI.    PHYSICAL EXAM:   /90 (BP Location: Left arm, Patient Position: Sitting, Cuff Size: Adult Large)  Pulse 83  Ht 1.88 m (6' 2\")  Wt 90.7 kg (200 lb)  SpO2 97%  BMI 25.68 kg/m2    GENERAL: Well groomed/well developed/well nourished male in NAD.  HEENT: EOMI, AT, NC.  SKIN: Warm to touch, dry.  No visible rashes or lesions.  RESP: No increased respiratory effort.  LYMPH: No LE edema.  ABD: Soft, NT, ND.  No CVAT.    MS: Full ROM in extremities.  CHINEDU: Deferred for check of PSA.  NEURO: Alert and oriented x 3.  PSYCH: Normal mood and affect, pleasant and agreeable during interview and exam.    Orders Only on 11/03/2017   Component Date Value Ref Range Status     Sodium 11/03/2017 139  133 - 144 mmol/L Final     Potassium 11/03/2017 3.6  3.4 - 5.3 mmol/L Final     Chloride 11/03/2017 105  94 - 109 mmol/L Final     Carbon Dioxide 11/03/2017 26  20 - 32 mmol/L Final     Anion Gap 11/03/2017 8  3 - 14 mmol/L Final     Glucose 11/03/2017 82  70 - 99 mg/dL Final    Fasting specimen     Urea Nitrogen 11/03/2017 12  7 - 30 mg/dL Final     Creatinine 11/03/2017 0.94  0.66 - 1.25 mg/dL Final     GFR Estimate 11/03/2017 86  >60 mL/min/1.7m2 Final    Non  GFR Calc     GFR Estimate If Black 11/03/2017 >90  >60 mL/min/1.7m2 Final    African American GFR Calc     Calcium 11/03/2017 9.0  8.5 - 10.1 mg/dL Final     Bilirubin Total 11/03/2017 0.5  0.2 - 1.3 mg/dL Final     Albumin 11/03/2017 3.8  3.4 - 5.0 g/dL Final     Protein Total 11/03/2017 7.8  6.8 - 8.8 g/dL Final     Alkaline Phosphatase 11/03/2017 73  40 - 150 U/L Final     ALT 11/03/2017 28  0 - 70 U/L Final     AST 11/03/2017 15  0 - 45 U/L Final     Cholesterol 11/03/2017 " 239* <200 mg/dL Final    Desirable:       <200 mg/dl     Triglycerides 11/03/2017 169* <150 mg/dL Final    Comment: Borderline high:  150-199 mg/dl  High:             200-499 mg/dl  Very high:       >499 mg/dl  Fasting specimen       HDL Cholesterol 11/03/2017 50  >39 mg/dL Final     LDL Cholesterol Calculated 11/03/2017 155* <100 mg/dL Final    Comment: Above desirable:  100-129 mg/dl  Borderline High:  130-159 mg/dL  High:             160-189 mg/dL  Very high:       >189 mg/dl       Non HDL Cholesterol 11/03/2017 189* <130 mg/dL Final    Comment: Above Desirable:  130-159 mg/dl  Borderline high:  160-189 mg/dl  High:             190-219 mg/dl  Very high:       >219 mg/dl       No results found for: PSA    PVR: 40mL    ASSESSMENT/PLAN:  Mr. Manny Pitt is a 47 year old male with symptoms consistent with BPH w/ LUTS. Discussed potential management options with the patient including: double voiding, timed voiding, avoiding known bladder irritants, alpha blocker medication, 5 alpha reductase inhibitor medication, and possible outlet reductive surgery, such as a TURP, down the road.  The patient has elected to proceed with Meds.    - PSA today given very extended course of symptoms  - Trial Flomax. Rx sent to patient's pharmacy. Side effects discussed.  -Will consider OAB if no improvement with Flomax    Follow up in 3 months for CHINEDU, an AUA symptom score, PVR and reassessment.  Call or RTC sooner with any issues.     Video urodynamics and/or cystoscopic evaluation would be the next appropriate diagnostic steps should Mr. Pitt fail conservative therapy.      I have enjoyed participating in the medical care of this pleasant patient.  Please don't hesitate to contact me with any questions or concerns.     Berna Tee PA-C  [facility]    30 minutes were spent with the patient today, > 50% in counseling and coordination of care.

## 2017-11-22 ENCOUNTER — MYC MEDICAL ADVICE (OUTPATIENT)
Dept: PEDIATRICS | Facility: CLINIC | Age: 47
End: 2017-11-22

## 2017-11-22 DIAGNOSIS — E78.5 HYPERLIPIDEMIA LDL GOAL <160: Primary | ICD-10-CM

## 2017-11-22 RX ORDER — ROSUVASTATIN CALCIUM 10 MG/1
10 TABLET, COATED ORAL DAILY
Qty: 30 TABLET | Refills: 1 | Status: SHIPPED | OUTPATIENT
Start: 2017-11-22 | End: 2017-12-13

## 2017-11-22 NOTE — TELEPHONE ENCOUNTER
Had stopped the statin for one week and then was instructed to restart at 1/2 pill.  Per My Chart message of 11/12-If the muscle pains recur, we can discuss trying a different statin to manage your cholesterol.   Stop statin now?  NEIDA Taylor RN

## 2017-11-22 NOTE — TELEPHONE ENCOUNTER
Script sent for crestor.  I'd wait a week- two weeks and let the muscle pain resolve then try the crestor.  Please have him let us know if the pain returns.    Holli Joyce MD

## 2017-12-11 ENCOUNTER — NURSE TRIAGE (OUTPATIENT)
Dept: NURSING | Facility: CLINIC | Age: 47
End: 2017-12-11

## 2017-12-11 ENCOUNTER — TELEPHONE (OUTPATIENT)
Dept: PEDIATRICS | Facility: CLINIC | Age: 47
End: 2017-12-11

## 2017-12-12 ENCOUNTER — TELEPHONE (OUTPATIENT)
Dept: PEDIATRICS | Facility: CLINIC | Age: 47
End: 2017-12-12

## 2017-12-12 NOTE — TELEPHONE ENCOUNTER
Discussed with pt  C/o lower abd gas pains a bit better this afternoon  Doubt this is due to 1 dose of crestor  Pt going home now and will monitor sx next few hours  Pt will go to UC/ER if pain worsens, fevers or nausea  Otherwise has a follow-up visit schedule with me tomorrow at 7:40AM

## 2017-12-12 NOTE — TELEPHONE ENCOUNTER
"Caller states that he  Took his first dose of Crestor last night. Today he  experienced some leg pain and had  bubbles  In his urine ..  His  medication education from [facility] pharmacy state to contact   provider if he develops \"foamy urine\" .  Caller does not recall having bubbles in urine before. Is also on Flomax from urology for  several weeks. Is very anxious about this  Because of symptoms from previous statin.  Triage protocol  Reviewed; [employer] and  [facility] resources cited. No specific precautions in [employer] and persistent foamy urine as a  symptom of proteinuria  noted.  Advised patient not to take medication tonight and  Contact clinic in a.m.  Advised message  requesting  advisement will be routed to provider  Advised to call if he develops any new or worsening symptoms  Reason for Disposition    Caller requesting a NON-URGENT new prescription or refill and triager unable to refill per unit policy    Protocols used: MEDICATION QUESTION CALL-ADULT-  Meagan Fang RN  FNA    "

## 2017-12-12 NOTE — TELEPHONE ENCOUNTER
Stomach pain getting worse.  Feels like he has a lot of gas.  No N or V.  No diarrhea  HA returning.  Only had one crestor pill.  Foamy urine happened immediately.  Urine getting better now.  Informed pt if he needed abd scan he would need to use ER.  He does have appointment tomorrow with you.  Dinora Ledezma RN

## 2017-12-12 NOTE — TELEPHONE ENCOUNTER
"Clinic Action Needed:Yes  Reason for Call: medication questions  Patient Recommendations/Teaching:Referred to clinic - within 24 hours  Teaching per [facility] Care guidelines.  Routed to: PCP; clinic staff    Caller states that he took his first dose of Crestor last night. Today he  experienced some leg pain and had  bubbles  In his urine ..  His  medication education from [facility] pharmacy state to contact   provider if he develops \"foamy urine\" .  Caller does not recall having bubbles in urine before. Is also on Flomax from urology for  several weeks. Is very anxious about this because of symptoms from previous statin.  Triage protocol  Reviewed; [employer] medex and  [facility] resources cited. No specific  Precautions for this  in Micromedex and persistent foamy urine as a  symptom of proteinuria  noted in [facility] ..  Advised patient not to take medication tonight and  contact clinic in a.m.  Advised message  requesting  advisement will be routed to provider  Advised to call if he develops any new or worsening symptoms  Reason for Disposition    Caller requesting a NON-URGENT new prescription or refill and triager unable to refill per unit policy    Protocols used: MEDICATION QUESTION CALL-ADULT-CAMILA Fang RN  FNA     "

## 2017-12-12 NOTE — TELEPHONE ENCOUNTER
Began taking Rosuvastatin two days ago.  Began Flomax two weeks ago.  Last night noted foamy urine when he voided.  Also has noted muscle aches in left leg since beginning this medication.   Noted pain in quadriceps.  Notes flatulence.  Patient did NOT take Crestor last night.  Will wait for further recommendations from Dr. Neff  Please call patient at [phone]. OK TO WILMAR Taylor RN

## 2017-12-13 ENCOUNTER — OFFICE VISIT (OUTPATIENT)
Dept: PEDIATRICS | Facility: CLINIC | Age: 47
End: 2017-12-13
Payer: COMMERCIAL

## 2017-12-13 VITALS
SYSTOLIC BLOOD PRESSURE: 122 MMHG | DIASTOLIC BLOOD PRESSURE: 70 MMHG | TEMPERATURE: 98 F | BODY MASS INDEX: 26.32 KG/M2 | WEIGHT: 205 LBS | HEART RATE: 60 BPM

## 2017-12-13 DIAGNOSIS — R39.9 URINARY SYMPTOM OR SIGN: Primary | ICD-10-CM

## 2017-12-13 DIAGNOSIS — E78.5 HYPERLIPIDEMIA LDL GOAL <160: ICD-10-CM

## 2017-12-13 DIAGNOSIS — N40.1 BENIGN PROSTATIC HYPERPLASIA WITH LOWER URINARY TRACT SYMPTOMS, SYMPTOM DETAILS UNSPECIFIED: ICD-10-CM

## 2017-12-13 LAB
ALBUMIN UR-MCNC: NEGATIVE MG/DL
APPEARANCE UR: CLEAR
BILIRUB UR QL STRIP: NEGATIVE
COLOR UR AUTO: YELLOW
GLUCOSE UR STRIP-MCNC: NEGATIVE MG/DL
HGB UR QL STRIP: NEGATIVE
KETONES UR STRIP-MCNC: NEGATIVE MG/DL
LEUKOCYTE ESTERASE UR QL STRIP: NEGATIVE
NITRATE UR QL: NEGATIVE
PH UR STRIP: 7.5 PH (ref 5–7)
SOURCE: ABNORMAL
SP GR UR STRIP: 1.02 (ref 1–1.03)
UROBILINOGEN UR STRIP-ACNC: 0.2 EU/DL (ref 0.2–1)

## 2017-12-13 PROCEDURE — 99214 OFFICE O/P EST MOD 30 MIN: CPT | Performed by: INTERNAL MEDICINE

## 2017-12-13 PROCEDURE — 81003 URINALYSIS AUTO W/O SCOPE: CPT | Performed by: INTERNAL MEDICINE

## 2017-12-13 NOTE — NURSING NOTE
"Chief Complaint   Patient presents with     Recheck Medication       Initial /70 (Cuff Size: Adult Large)  Pulse 60  Temp 98  F (36.7  C) (Oral)  Wt 205 lb (93 kg)  BMI 26.32 kg/m2 Estimated body mass index is 26.32 kg/(m^2) as calculated from the following:    Height as of 11/20/17: 6' 2\" (1.88 m).    Weight as of this encounter: 205 lb (93 kg).  Medication Reconciliation: tatyana El CMA    "

## 2017-12-13 NOTE — PROGRESS NOTES
"SUBJECTIVE:                                                    Manny Pitt is a 47 year old male who presents to clinic today for the following health issues:    Follow-up abdominal pain.  Contacted clinic y'day regarding generalized abd pain  Pain started after eating several desserts, holiday foods at work.  See recent telephone encounter  Pt went home after work and pains resolved with eructation and passing lower GI gas  Pain has not recurred    Concerns regarding muscle aches after starting crestor  Also concerned his \"urine was foamy\".  States he noted sx after 1 dose of crestor    BPH.   Recent urology visit, started flomax  BPH sx have improved on flomax      Patient Active Problem List   Diagnosis     Hyperlipidemia LDL goal <160     Recurrent basal cell carcinoma of skin     Onychomycosis     Hemorrhoids, unspecified hemorrhoid type     Benign prostatic hyperplasia with lower urinary tract symptoms, symptom details unspecified     Past Surgical History:   Procedure Laterality Date     C NONSPECIFIC PROCEDURE      Hernia     COLONOSCOPY  2007     COLONOSCOPY  12/26/2012    Procedure: COLONOSCOPY;  Colonoscopy ;  Surgeon: Ranulfo Ramirez MD;  Location: [facility]     COLONOSCOPY N/A 12/20/2016    Procedure: COLONOSCOPY;  Surgeon: Paula Johnson MD;  Location: [facility]     HERNIA REPAIR         Social History   Substance Use Topics     Smoking status: Never Smoker     Smokeless tobacco: Never Used     Alcohol use Yes      Comment: 2-3 drinks per week     Family History   Problem Relation Age of Onset     CANCER Maternal Grandmother      basal cell     CEREBROVASCULAR DISEASE Maternal Grandmother      CANCER Maternal Grandfather      basal cell     CEREBROVASCULAR DISEASE Paternal Grandmother      Alcohol/Drug Paternal Grandfather      Hypertension Mother      HEART DISEASE Father      anuerysm     Hypertension Father      CANCER Father      lung     DIABETES No family hx of      Colon Cancer No family hx " "of          Current Outpatient Prescriptions   Medication Sig Dispense Refill     tamsulosin (FLOMAX) 0.4 MG capsule Take 1 capsule (0.4 mg) by mouth daily 90 capsule 1     meclizine (ANTIVERT) 25 MG tablet Take 1 tablet (25 mg) by mouth 3 times daily as needed 60 tablet 3     KRILL OIL PO        psyllium (METAMUCIL/KONSYL) Packet Take 1 packet by mouth daily       Acetaminophen (TYLENOL PO)        multivitamin, therapeutic with minerals (MULTI-VITAMIN) TABS Take 1 tablet by mouth daily.       rosuvastatin (CRESTOR) 10 MG tablet Take 1 tablet (10 mg) by mouth daily 30 tablet 1       ROS: The following systems have been completely reviewed and are negative except as noted in the HPI: CONSTITUTIONAL,  GENITOURINARY    OBJECTIVE:                                                    /70 (Cuff Size: Adult Large)  Pulse 60  Temp 98  F (36.7  C) (Oral)  Wt 205 lb (93 kg)  BMI 26.32 kg/m2 Body mass index is 26.32 kg/(m^2).  GENERAL:  alert,  no distress  RESP: lungs clear to auscultation - no rales, no rhonchi, no wheezes  CV: regular rates and rhythm, normal S1 S2, no S3 or S4 and no murmur, no click or rub -  ABDOMEN: soft, no tenderness, no  hepatosplenomegaly, no masses, normal bowel sounds  MS: extremities- no edema       ASSESSMENT/PLAN:                                                        ICD-10-CM    1. Urinary symptom or sign R39.9 UA reflex to Microscopic       Concerns regarding \"foam\" in urine  Denies dysuria or hematuria       Screen for proteinuria, check u/a      2. Benign prostatic hyperplasia with lower urinary tract symptoms, symptom details unspecified N40.1 Sx improved.  Continue flomax         3. Hyperlipidemia LDL goal <160 E78.5 crestor d/c'd.  Pt prefers to work on healthy diet choices/declines statin.  A handout with pertinent patient health education information is given/return for rpt labwork 6 months.       Ranulfo Neff MD  [facility]     "

## 2017-12-13 NOTE — MR AVS SNAPSHOT
After Visit Summary   12/13/2017    Manny Pitt    MRN: [id number]           Patient Information     Date Of Birth          1970        Visit Information        Provider Department      12/13/2017 7:40 AM Ranulfo Neff MD [facility]        Today's Diagnoses     Urinary symptom or sign    -  1    Benign prostatic hyperplasia with lower urinary tract symptoms, symptom details unspecified          Care Instructions                     Controlling Cholesterol  What is cholesterol?   Cholesterol is a fatty substance. It has both good and bad effects on the body. Your body needs small amounts of cholesterol to make hormones and to build and maintain cells. However, when your body has too much cholesterol, deposits of fat called plaque form inside the walls of your blood vessels (arteries). The blood vessel walls thicken and the vessels become narrower. This is a condition called atherosclerosis. These changes make it harder for blood to flow through the blood vessels, increasing your risk of heart disease, heart attack, and stroke. The plaque can also easily break off and cause a blockage. When the artery is blocked, no blood can flow through it. This prevents the heart muscle from getting oxygen and can cause a heart attack. If a piece of plaque breaks off and flows to the brain, it can cause a stroke.   Most of the cholesterol in your blood is made by your liver from the fats, carbohydrates, and proteins you eat. You also get cholesterol by eating animal products such as meat, eggs, and high-fat dairy products such as whole milk, cream, and real butter.   It is important to find out what your cholesterol numbers are because lowering cholesterol levels that are too high lessens your risk for developing heart disease. It reduces the chance of a heart attack or death from heart disease, even if you already have heart disease.   How is cholesterol measured?   When you get your  "cholesterol checked, your healthcare provider will give you a number for your total cholesterol level. You can use the chart below to see if your total cholesterol is high.     Total Cholesterol Level (mg/dL)   ----------------------------------------   less than 200   good   200 to 239      borderline high   240 or above    high   ----------------------------------------      When your total cholesterol is measured and found to be high, your healthcare provider may also check the amount of LDL (low-density lipoprotein) and HDL (high-density lipoprotein) in your blood. LDL and HDL carry cholesterol through your blood. LDL carries a lot of cholesterol, leaves behind fatty deposits on your artery walls, and contributes to heart disease. HDL does the opposite. HDL cleans the artery walls and removes extra cholesterol from the body, thus lowering the risk of heart disease. LDL cholesterol is called bad cholesterol. (You can think of \"L\" for \"lousy\" cholesterol.) HDL cholesterol is called good cholesterol (think of \"H\" for \"healthy\" cholesterol). It is good to have low levels of LDL and high levels of HDL.   Because HDL cholesterol protects against heart disease, higher numbers are better. HDL levels of 60 mg/dL or more help to lower your risk for heart disease. A level equal to or less than 40 mg/dL is low and is considered a major risk factor because it increases your risk for developing heart disease   The level of LDL cholesterol that is healthy for you depends on your risk of heart disease and heart attack. In general, the higher your LDL level and the more risk factors you have for heart disease, the greater your chances of developing heart disease or having a heart attack. These are the recommended goals for LDL, according to risk level:   The goal is less than 160 mg/dL if your risk of heart disease is low.   The goal is less than 130 mg/dL if you have a moderate risk.   The goal is less than 100 mg/dL if you " have a high risk of heart disease or you already have heart disease or diabetes.   For many people with heart disease, especially if they also have diabetes, the goal is less than 70 mg/dL.   Lowering cholesterol, especially the LDL, is connected or linked with:   Slowing, stopping, or even reversing the buildup of plaque   Reducing the chances of heart attack by making plaques more stable and less likely to break off or rupture.   This means the chance of having a heart attack is much less.   In addition to high levels of total cholesterol and LDL, major risks for heart disease include:   diabetes   cigarette smoking   high blood pressure (140/90 mm Hg or higher or you are taking blood pressure medicine)   low HDL cholesterol (less than 40 mg/dL)   family history of early heart disease (father or brother diagnosed with heart disease before age 55, or mother or sister diagnosed before age 65)   age 45 or older for men and age 55 or older for women.   If you have diabetes, your risk of heart disease is high. If you do not have diabetes but you have 2 or more of the other risk factors in this list, your risk is moderate to high. Based on your personal and family history, your healthcare provider can help you calculate your risk level.   How can I control my cholesterol level?   High cholesterol may run in families. Know your family history and discuss it with your healthcare provider.   You can often control cholesterol levels by   eating right   exercising   not smoking   losing weight if you are overweight.   If you have a high risk for heart disease, your healthcare provider may prescribe cholesterol-lowering medicine as well as changes in lifestyle.   Eat right.  Follow these diet guidelines to help control your cholesterol:   Limit the cholesterol in your diet to less than 300 mg per day. If you have heart disease, limit cholesterol to less than 200 mg per day.   Be careful about the amounts and types of fat that  "you eat. Fats should contribute no more than 25 to 35% of your daily calories. Most of your dietary fat should be from polyunsaturated and monounsaturated fats, which are healthier than saturated fat and trans fats.   Saturated fat raises your blood cholesterol because it makes it hard for the body to clear the cholesterol away. Less than 7 to 10% of your calories should come from saturated fat. Saturated fat is found in different amounts in almost all foods. Butter, some oils, meat, and poultry fat contain a lot of saturated fat.   Trans fatty acids, often called trans fats, tend to raise your bad LDL cholesterol and lower your good HDL cholesterol. Trans fats naturally occur in some foods, mostly in meat and dairy products. But food makers can create trans fats when they are preparing food for grocery stores. This is usually done by adding hydrogen to fats. If the list of ingredients of a food product includes the words \"partially hydrogenated\" (usually referring to oils, such as soybean oil and others), the product is likely to contain trans fats. Try to eat as little trans fat as possible. As of January 2006, nutrition labels must list trans fats if the food contains them. Check the nutrition bar on the side of the package.   Polyunsaturated fats are found in fish and some vegetable oils. Monounsaturated fats are found in olive oil, canola oil, and avocados. Both types of these healthier fats are also found in many nuts and legumes.   Adjust the amount of calories you eat and exercise regularly, according to your healthcare provider's exercise prescription, to maintain your recommended body weight.   To control the cholesterol and types and amounts of fat you eat:   Check food labels for fat and cholesterol content. Choose the foods with less fat per serving.   Limit the amount of butter and margarine you eat.   Use olive, canola, sunflower, safflower, soybean, or corn oil. Avoid tropical oils such as palm or " coconut oil. Also avoid oils that have been hydrogenated or partially hydrogenated.   Use salad dressings and margarine made with polyunsaturated and monounsaturated fats.   Use egg whites or egg substitutes rather than whole eggs.   Replace whole-milk dairy products with nonfat or low-fat milk, cheese, spreads, and yogurt.   Eat skinless chicken, turkey, fish, and meatless entrees more often than red meat.   Choose lean cuts of meat and trim off all visible fat. Keep portion sizes moderate.   Avoid fatty desserts such as ice cream, cream-filled cakes, and cheesecakes. Choose fresh fruits, nonfat frozen yogurt, Popsicles, etc.   Reduce the amount of fried foods, vending machine food, and fast food you eat.   Eat several daily servings of fruits and vegetables (especially fresh fruits and leafy vegetables), beans, and whole grains (such as whole wheat, bran, brown rice, oats, and oatmeal). The fiber in these foods helps lower cholesterol.   Eat 4 to 5 servings of nuts a week. Examples of nuts that can be a part of a healthy diet are walnuts, almonds, hazelnuts, peanuts, pecans, and pistachio nuts.   Look for low-fat or nonfat varieties of the foods you like to eat, or look for substitutes.   Exercise.  Exercise goes hand-in-hand with a healthy diet for controlling cholesterol. Exercise helps because it:   Keeps your weight down.   Decreases your total cholesterol level.   Decreases your LDL (bad cholesterol).   Increases your HDL (good cholesterol).   A good exercise program includes aerobic exercise. Aerobic exercise is any activity that keeps your heart rate up (such as swimming, jogging, walking, and bicycling). You should get at least 30 minutes of moderate aerobic exercise most days of the week. Moderate aerobic exercise is generally defined as requiring the energy it takes to walk 2 miles in 30 minutes. You may need to exercise 60 minutes a day to prevent weight gain and 90 minutes a day to lose weight.   If  "you haven't been exercising, ask your healthcare provider for an exercise prescription and start your new exercise program slowly.   Don't smoke.  Do not smoke. Smoking increases your risk of heart disease because it lowers HDL levels, increases your risk of blood clots, and decreases oxygen to the tissues.   Lose excess weight.  Extra weight increases your risk for heart and blood vessel disease. One way it does this is by causing your LDL (\"bad\") cholesterol to go up. Extra weight can also make you tired. It takes a lot of energy to carry all those pounds around. The result is that you are less active. This can mean that you don't get enough exercise and gain even more weight.   Losing excess weight:   Improves not only the bad LDL cholesterol but other blood fats as well.   Lowers your risk for heart attack or stroke.   Increases your energy and helps you feel better (both physically and mentally) and become more active.   Your weight is primarily the result of 2 factors. One is the number of calories you consume. The other is the number of calories you \"burn\". If you eat more calories than you use, your body will store the extra calories as fat and your weight will go up. If your body uses more calories than you eat, you will lose weight.   Here are some things you can do to lose weight.   Talk to your healthcare provider about your weight. Ask how a change in diet and exercise will change your cholesterol levels. Plan for gradual weight loss, just 1 or 2 pounds per week   Eat fewer calories.   Get more physical activity.   Keep a diary of your food and exercise for a couple of weeks to become more aware of your habits.   In summary, changes that you can make in your lifestyle to control your cholesterol level are:   Eat healthy.   Get regular exercise.   Don't smoke.   Keep a healthy weight.   Have your cholesterol levels checked as often as your provider recommends.   Cholesterol in the Diet  Cholesterol is a " fatty substance in your body and in foods made from animals. There is a lot of it in meat, including beef, pork, chicken, and turkey. Whole-milk dairy products, egg yolks, and shellfish also have a lot of cholesterol.   Your body needs cholesterol to make hormones and build nerve cells. You don't have to get it from food because your body makes cholesterol. If you eat too many foods high in cholesterol or saturated fat you can get too much cholesterol. It can cause high levels of cholesterol in the blood. High cholesterol increases your risk for heart disease.   How are saturated fat and trans fats related to cholesterol levels?   Like cholesterol, saturated fats are found mostly in animal products. Limiting the saturated fat in your diet is just as important as limiting cholesterol because your body makes more cholesterol when you eat saturated fat. Trans fats are another type of fat in animal products. Trans fats are also in many processed foods, such as cakes, cookies and potato chips. Like saturated fat, trans fats raise cholesterol levels in the blood.   How much cholesterol do animal products have?   As the table below shows, some foods have more cholesterol and saturated fat than others. They may be high in both, or low in both, or high in one but not the other. The healthiest diets include mostly foods that are low in cholesterol, saturated, and trans fat.   Most foods in the meat group have about the same amount of cholesterol per serving, regardless of the type or cut of meat. However, the amount of saturated fat in these various meats can be very different. High-fat cuts, such as prime rib and dark-meat poultry with the skin, contain a lot more saturated fat than lean cuts, such as pork tenderloin and chicken breast without skin.   Whole-milk dairy products, such as whole milk, cheese, ice cream, sour cream, and butter, have a lot of cholesterol and saturated fat. The good news is that food producers can  remove both cholesterol and saturated fat from dairy foods. When dairy is skimmed of its fats, the cholesterol is skimmed off along with it. Skim (nonfat) dairy products are a healthy food choice.   Shellfish are low in saturated fat. Some shellfish are high in cholesterol, but the saturated fat is so low that these foods are still healthy. Fin fish, such as salmon, tuna, trout, and halibut, are relatively low in cholesterol and saturated fat.   Cholesterol and Saturated Fat Content of Selected Foods     Food                                 Fat             Cholesterol                                       (grams)         (milligrams)   --------------------------------------------------------------------   8 ounces (oz) whole milk             4.5 g               25 mg   8 ounces skim milk                   0.36 g               5 mg   1 tablespoon butter                  7 g                 30 mg   4 tablespoon sour cream              5.5 g               24 mg   3 oz. pork tenderloin                2 g                 65 mg   3 oz. pork sausage                   7.5 g               70 mg   3 oz sirloin steak                   3 g                 76 mg   3 oz beef ribs                       5 g                 69 mg   3 oz chicken breast without skin     1 g                 73 mg   3 oz chicken thigh with skin         3.7 g               79 mg   3 oz shrimp                          0.25               166 mg   3 oz salmon                          1.5                 50 mg   1/2 cup vegetable shortening        25.5 g                0 mg    ---------------------------------------------------------------------      How much cholesterol can I have in my diet?   The guidelines for cholesterol in the diet depend on your medical condition. The recommendations are:   less than 200 mg a day if you have high cholesterol or heart disease   less than 300 mg of cholesterol a day if you do not have high cholesterol or heart disease.    Everyone should try to avoid saturated and trans fats.   Limiting cholesterol, saturated fat, and trans fat is easy if you get in the habit of cooking lean. Choose the leanest cuts of meats and dairy products, including more fish and less processed food. Some plant foods, such as palm oil, coconut oil, and cocoa butter do contain saturated fat, but it is not known if these fats have the same harmful effect on the heart as the saturated fat in animal products. Plant foods, such as grains, fruits, vegetables, vegetable oils, nuts, and seeds, do not contain any cholesterol.     Published by [employer].  This content is reviewed periodically and is subject to change as new health information becomes available. The information is intended to inform and educate and is not a replacement for medical evaluation, advice, diagnosis or treatment by a healthcare professional.   Marlena Boss RD, CDE   ? 2010 [facility] and/or its affiliates. All Rights Reserved.                     Follow-ups after your visit        Your next 10 appointments already scheduled     Feb 27, 2018  3:00 PM CST   Return Visit with Berna Tee PA-C   [facility] ([facility])    [address]  [address]  [place] [address] [phone]              Who to contact     If you have questions or need follow up information about today's clinic visit or your schedule please contact [facility] directly at [phone].  Normal or non-critical lab and imaging results will be communicated to you by MyChart, letter or phone within 4 business days after the clinic has received the results. If you do not hear from us within 7 days, please contact the clinic through MyChart or phone. If you have a critical or abnormal lab result, we will notify you by phone as soon as possible.  Submit refill requests through [employer] or call your pharmacy and they will forward the refill request to  us. Please allow 3 business days for your refill to be completed.          Additional Information About Your Visit        MyChart Information     [facility] gives you secure access to your electronic health record. If you see a primary care provider, you can also send messages to your care team and make appointments. If you have questions, please call your primary care clinic.  If you do not have a primary care provider, please call [phone] and they will assist you.        Care EveryWhere ID     This is your Care EveryWhere ID. This could be used by other organizations to access your [facility] medical records  [id number]        Your Vitals Were     Pulse Temperature BMI (Body Mass Index)             60 98  F (36.7  C) (Oral) 26.32 kg/m2          Blood Pressure from Last 3 Encounters:   12/13/17 122/70   11/20/17 146/90   11/08/17 138/84    Weight from Last 3 Encounters:   12/13/17 205 lb (93 kg)   11/20/17 200 lb (90.7 kg)   11/08/17 202 lb (91.6 kg)              We Performed the Following     UA reflex to Microscopic        Primary Care Provider Office Phone # Fax #    Ranulfo Neff [phone] [address] [place] [address]        Equal Access to Services     [facility]: Hadii aad ku hadasho Soomaali, waaxda luqadaha, qaybta kaalmada adeegyada, sancho thomasin reena doe. So [facility] [phone].    ATENCIÓN: Si habla español, tiene a romero disposición servicios gratuitos de asistencia lingüística. Llame al [phone].    We comply with applicable federal civil rights laws and [place] laws. We do not discriminate on the basis of race, color, national origin, age, disability, sex, sexual orientation, or gender identity.            Thank you!     Thank you for choosing [facility]  for your care. Our goal is always to provide you with excellent care. Hearing back from our patients is one way we can continue to improve our services. Please take  a few minutes to complete the written survey that you may receive in the mail after your visit with us. Thank you!             Your Updated Medication List - Protect others around you: Learn how to safely use, store and throw away your medicines at www.disposemymeds.org.          This list is accurate as of: 12/13/17  8:05 AM.  Always use your most recent med list.                   Brand Name Dispense Instructions for use Diagnosis    KRILL OIL PO           meclizine 25 MG tablet    ANTIVERT    60 tablet    Take 1 tablet (25 mg) by mouth 3 times daily as needed    Motion sickness, subsequent encounter       Multi-vitamin Tabs tablet      Take 1 tablet by mouth daily.        psyllium Packet    METAMUCIL/KONSYL     Take 1 packet by mouth daily        rosuvastatin 10 MG tablet    CRESTOR    30 tablet    Take 1 tablet (10 mg) by mouth daily    Hyperlipidemia LDL goal <160       tamsulosin 0.4 MG capsule    FLOMAX    90 capsule    Take 1 capsule (0.4 mg) by mouth daily    Benign prostatic hyperplasia with nocturia       TYLENOL PO

## 2017-12-13 NOTE — PATIENT INSTRUCTIONS
Controlling Cholesterol  What is cholesterol?   Cholesterol is a fatty substance. It has both good and bad effects on the body. Your body needs small amounts of cholesterol to make hormones and to build and maintain cells. However, when your body has too much cholesterol, deposits of fat called plaque form inside the walls of your blood vessels (arteries). The blood vessel walls thicken and the vessels become narrower. This is a condition called atherosclerosis. These changes make it harder for blood to flow through the blood vessels, increasing your risk of heart disease, heart attack, and stroke. The plaque can also easily break off and cause a blockage. When the artery is blocked, no blood can flow through it. This prevents the heart muscle from getting oxygen and can cause a heart attack. If a piece of plaque breaks off and flows to the brain, it can cause a stroke.   Most of the cholesterol in your blood is made by your liver from the fats, carbohydrates, and proteins you eat. You also get cholesterol by eating animal products such as meat, eggs, and high-fat dairy products such as whole milk, cream, and real butter.   It is important to find out what your cholesterol numbers are because lowering cholesterol levels that are too high lessens your risk for developing heart disease. It reduces the chance of a heart attack or death from heart disease, even if you already have heart disease.   How is cholesterol measured?   When you get your cholesterol checked, your healthcare provider will give you a number for your total cholesterol level. You can use the chart below to see if your total cholesterol is high.     Total Cholesterol Level (mg/dL)   ----------------------------------------   less than 200   good   200 to 239      borderline high   240 or above    high   ----------------------------------------      When your total cholesterol is measured and found to be high, your healthcare provider  "may also check the amount of LDL (low-density lipoprotein) and HDL (high-density lipoprotein) in your blood. LDL and HDL carry cholesterol through your blood. LDL carries a lot of cholesterol, leaves behind fatty deposits on your artery walls, and contributes to heart disease. HDL does the opposite. HDL cleans the artery walls and removes extra cholesterol from the body, thus lowering the risk of heart disease. LDL cholesterol is called bad cholesterol. (You can think of \"L\" for \"lousy\" cholesterol.) HDL cholesterol is called good cholesterol (think of \"H\" for \"healthy\" cholesterol). It is good to have low levels of LDL and high levels of HDL.   Because HDL cholesterol protects against heart disease, higher numbers are better. HDL levels of 60 mg/dL or more help to lower your risk for heart disease. A level equal to or less than 40 mg/dL is low and is considered a major risk factor because it increases your risk for developing heart disease   The level of LDL cholesterol that is healthy for you depends on your risk of heart disease and heart attack. In general, the higher your LDL level and the more risk factors you have for heart disease, the greater your chances of developing heart disease or having a heart attack. These are the recommended goals for LDL, according to risk level:   The goal is less than 160 mg/dL if your risk of heart disease is low.   The goal is less than 130 mg/dL if you have a moderate risk.   The goal is less than 100 mg/dL if you have a high risk of heart disease or you already have heart disease or diabetes.   For many people with heart disease, especially if they also have diabetes, the goal is less than 70 mg/dL.   Lowering cholesterol, especially the LDL, is connected or linked with:   Slowing, stopping, or even reversing the buildup of plaque   Reducing the chances of heart attack by making plaques more stable and less likely to break off or rupture.   This means the chance of having a " heart attack is much less.   In addition to high levels of total cholesterol and LDL, major risks for heart disease include:   diabetes   cigarette smoking   high blood pressure (140/90 mm Hg or higher or you are taking blood pressure medicine)   low HDL cholesterol (less than 40 mg/dL)   family history of early heart disease (father or brother diagnosed with heart disease before age 55, or mother or sister diagnosed before age 65)   age 45 or older for men and age 55 or older for women.   If you have diabetes, your risk of heart disease is high. If you do not have diabetes but you have 2 or more of the other risk factors in this list, your risk is moderate to high. Based on your personal and family history, your healthcare provider can help you calculate your risk level.   How can I control my cholesterol level?   High cholesterol may run in families. Know your family history and discuss it with your healthcare provider.   You can often control cholesterol levels by   eating right   exercising   not smoking   losing weight if you are overweight.   If you have a high risk for heart disease, your healthcare provider may prescribe cholesterol-lowering medicine as well as changes in lifestyle.   Eat right.  Follow these diet guidelines to help control your cholesterol:   Limit the cholesterol in your diet to less than 300 mg per day. If you have heart disease, limit cholesterol to less than 200 mg per day.   Be careful about the amounts and types of fat that you eat. Fats should contribute no more than 25 to 35% of your daily calories. Most of your dietary fat should be from polyunsaturated and monounsaturated fats, which are healthier than saturated fat and trans fats.   Saturated fat raises your blood cholesterol because it makes it hard for the body to clear the cholesterol away. Less than 7 to 10% of your calories should come from saturated fat. Saturated fat is found in different amounts in almost all foods.  "Butter, some oils, meat, and poultry fat contain a lot of saturated fat.   Trans fatty acids, often called trans fats, tend to raise your bad LDL cholesterol and lower your good HDL cholesterol. Trans fats naturally occur in some foods, mostly in meat and dairy products. But food makers can create trans fats when they are preparing food for grocery stores. This is usually done by adding hydrogen to fats. If the list of ingredients of a food product includes the words \"partially hydrogenated\" (usually referring to oils, such as soybean oil and others), the product is likely to contain trans fats. Try to eat as little trans fat as possible. As of January 2006, nutrition labels must list trans fats if the food contains them. Check the nutrition bar on the side of the package.   Polyunsaturated fats are found in fish and some vegetable oils. Monounsaturated fats are found in olive oil, canola oil, and avocados. Both types of these healthier fats are also found in many nuts and legumes.   Adjust the amount of calories you eat and exercise regularly, according to your healthcare provider's exercise prescription, to maintain your recommended body weight.   To control the cholesterol and types and amounts of fat you eat:   Check food labels for fat and cholesterol content. Choose the foods with less fat per serving.   Limit the amount of butter and margarine you eat.   Use olive, canola, sunflower, safflower, soybean, or corn oil. Avoid tropical oils such as palm or coconut oil. Also avoid oils that have been hydrogenated or partially hydrogenated.   Use salad dressings and margarine made with polyunsaturated and monounsaturated fats.   Use egg whites or egg substitutes rather than whole eggs.   Replace whole-milk dairy products with nonfat or low-fat milk, cheese, spreads, and yogurt.   Eat skinless chicken, turkey, fish, and meatless entrees more often than red meat.   Choose lean cuts of meat and trim off all visible " "fat. Keep portion sizes moderate.   Avoid fatty desserts such as ice cream, cream-filled cakes, and cheesecakes. Choose fresh fruits, nonfat frozen yogurt, Popsicles, etc.   Reduce the amount of fried foods, vending machine food, and fast food you eat.   Eat several daily servings of fruits and vegetables (especially fresh fruits and leafy vegetables), beans, and whole grains (such as whole wheat, bran, brown rice, oats, and oatmeal). The fiber in these foods helps lower cholesterol.   Eat 4 to 5 servings of nuts a week. Examples of nuts that can be a part of a healthy diet are walnuts, almonds, hazelnuts, peanuts, pecans, and pistachio nuts.   Look for low-fat or nonfat varieties of the foods you like to eat, or look for substitutes.   Exercise.  Exercise goes hand-in-hand with a healthy diet for controlling cholesterol. Exercise helps because it:   Keeps your weight down.   Decreases your total cholesterol level.   Decreases your LDL (bad cholesterol).   Increases your HDL (good cholesterol).   A good exercise program includes aerobic exercise. Aerobic exercise is any activity that keeps your heart rate up (such as swimming, jogging, walking, and bicycling). You should get at least 30 minutes of moderate aerobic exercise most days of the week. Moderate aerobic exercise is generally defined as requiring the energy it takes to walk 2 miles in 30 minutes. You may need to exercise 60 minutes a day to prevent weight gain and 90 minutes a day to lose weight.   If you haven't been exercising, ask your healthcare provider for an exercise prescription and start your new exercise program slowly.   Don't smoke.  Do not smoke. Smoking increases your risk of heart disease because it lowers HDL levels, increases your risk of blood clots, and decreases oxygen to the tissues.   Lose excess weight.  Extra weight increases your risk for heart and blood vessel disease. One way it does this is by causing your LDL (\"bad\") cholesterol " "to go up. Extra weight can also make you tired. It takes a lot of energy to carry all those pounds around. The result is that you are less active. This can mean that you don't get enough exercise and gain even more weight.   Losing excess weight:   Improves not only the bad LDL cholesterol but other blood fats as well.   Lowers your risk for heart attack or stroke.   Increases your energy and helps you feel better (both physically and mentally) and become more active.   Your weight is primarily the result of 2 factors. One is the number of calories you consume. The other is the number of calories you \"burn\". If you eat more calories than you use, your body will store the extra calories as fat and your weight will go up. If your body uses more calories than you eat, you will lose weight.   Here are some things you can do to lose weight.   Talk to your healthcare provider about your weight. Ask how a change in diet and exercise will change your cholesterol levels. Plan for gradual weight loss, just 1 or 2 pounds per week   Eat fewer calories.   Get more physical activity.   Keep a diary of your food and exercise for a couple of weeks to become more aware of your habits.   In summary, changes that you can make in your lifestyle to control your cholesterol level are:   Eat healthy.   Get regular exercise.   Don't smoke.   Keep a healthy weight.   Have your cholesterol levels checked as often as your provider recommends.   Cholesterol in the Diet  Cholesterol is a fatty substance in your body and in foods made from animals. There is a lot of it in meat, including beef, pork, chicken, and turkey. Whole-milk dairy products, egg yolks, and shellfish also have a lot of cholesterol.   Your body needs cholesterol to make hormones and build nerve cells. You don't have to get it from food because your body makes cholesterol. If you eat too many foods high in cholesterol or saturated fat you can get too much cholesterol. It can " cause high levels of cholesterol in the blood. High cholesterol increases your risk for heart disease.   How are saturated fat and trans fats related to cholesterol levels?   Like cholesterol, saturated fats are found mostly in animal products. Limiting the saturated fat in your diet is just as important as limiting cholesterol because your body makes more cholesterol when you eat saturated fat. Trans fats are another type of fat in animal products. Trans fats are also in many processed foods, such as cakes, cookies and potato chips. Like saturated fat, trans fats raise cholesterol levels in the blood.   How much cholesterol do animal products have?   As the table below shows, some foods have more cholesterol and saturated fat than others. They may be high in both, or low in both, or high in one but not the other. The healthiest diets include mostly foods that are low in cholesterol, saturated, and trans fat.   Most foods in the meat group have about the same amount of cholesterol per serving, regardless of the type or cut of meat. However, the amount of saturated fat in these various meats can be very different. High-fat cuts, such as prime rib and dark-meat poultry with the skin, contain a lot more saturated fat than lean cuts, such as pork tenderloin and chicken breast without skin.   Whole-milk dairy products, such as whole milk, cheese, ice cream, sour cream, and butter, have a lot of cholesterol and saturated fat. The good news is that food producers can remove both cholesterol and saturated fat from dairy foods. When dairy is skimmed of its fats, the cholesterol is skimmed off along with it. Skim (nonfat) dairy products are a healthy food choice.   Shellfish are low in saturated fat. Some shellfish are high in cholesterol, but the saturated fat is so low that these foods are still healthy. Fin fish, such as salmon, tuna, trout, and halibut, are relatively low in cholesterol and saturated fat.   Cholesterol  and Saturated Fat Content of Selected Foods     Food                                 Fat             Cholesterol                                       (grams)         (milligrams)   --------------------------------------------------------------------   8 ounces (oz) whole milk             4.5 g               25 mg   8 ounces skim milk                   0.36 g               5 mg   1 tablespoon butter                  7 g                 30 mg   4 tablespoon sour cream              5.5 g               24 mg   3 oz. pork tenderloin                2 g                 65 mg   3 oz. pork sausage                   7.5 g               70 mg   3 oz sirloin steak                   3 g                 76 mg   3 oz beef ribs                       5 g                 69 mg   3 oz chicken breast without skin     1 g                 73 mg   3 oz chicken thigh with skin         3.7 g               79 mg   3 oz shrimp                          0.25               166 mg   3 oz salmon                          1.5                 50 mg   1/2 cup vegetable shortening        25.5 g                0 mg    ---------------------------------------------------------------------      How much cholesterol can I have in my diet?   The guidelines for cholesterol in the diet depend on your medical condition. The recommendations are:   less than 200 mg a day if you have high cholesterol or heart disease   less than 300 mg of cholesterol a day if you do not have high cholesterol or heart disease.   Everyone should try to avoid saturated and trans fats.   Limiting cholesterol, saturated fat, and trans fat is easy if you get in the habit of cooking lean. Choose the leanest cuts of meats and dairy products, including more fish and less processed food. Some plant foods, such as palm oil, coconut oil, and cocoa butter do contain saturated fat, but it is not known if these fats have the same harmful effect on the heart as the saturated fat in animal  products. Plant foods, such as grains, fruits, vegetables, vegetable oils, nuts, and seeds, do not contain any cholesterol.     Published by [employer].  This content is reviewed periodically and is subject to change as new health information becomes available. The information is intended to inform and educate and is not a replacement for medical evaluation, advice, diagnosis or treatment by a healthcare professional.   Marlena Boss RD, CDE   ? 2010 [facility] and/or its affiliates. All Rights Reserved.

## 2017-12-20 ENCOUNTER — MYC MEDICAL ADVICE (OUTPATIENT)
Dept: UROLOGY | Facility: CLINIC | Age: 47
End: 2017-12-20

## 2017-12-28 NOTE — TELEPHONE ENCOUNTER
Called patient and LM.   Per PA, patient should come in to leave a [facility]. Will wait for return phone call. Stephanie Morales LPN

## 2018-01-02 ENCOUNTER — OFFICE VISIT (OUTPATIENT)
Dept: UROLOGY | Facility: CLINIC | Age: 48
End: 2018-01-02
Payer: COMMERCIAL

## 2018-01-02 VITALS
BODY MASS INDEX: 25.67 KG/M2 | HEIGHT: 74 IN | HEART RATE: 68 BPM | WEIGHT: 200 LBS | DIASTOLIC BLOOD PRESSURE: 82 MMHG | SYSTOLIC BLOOD PRESSURE: 126 MMHG

## 2018-01-02 DIAGNOSIS — R35.0 URINARY FREQUENCY: Primary | ICD-10-CM

## 2018-01-02 DIAGNOSIS — R35.0 URINARY FREQUENCY: ICD-10-CM

## 2018-01-02 LAB
ALBUMIN UR-MCNC: NEGATIVE MG/DL
APPEARANCE UR: CLEAR
BILIRUB UR QL STRIP: NEGATIVE
COLOR UR AUTO: YELLOW
CREAT SERPL-MCNC: 0.88 MG/DL (ref 0.66–1.25)
GFR SERPL CREATININE-BSD FRML MDRD: >90 ML/MIN/1.7M2
GLUCOSE UR STRIP-MCNC: NEGATIVE MG/DL
HGB UR QL STRIP: ABNORMAL
KETONES UR STRIP-MCNC: NEGATIVE MG/DL
LEUKOCYTE ESTERASE UR QL STRIP: NEGATIVE
MUCOUS THREADS #/AREA URNS LPF: PRESENT /LPF
NITRATE UR QL: NEGATIVE
PH UR STRIP: 5.5 PH (ref 5–7)
RBC #/AREA URNS AUTO: 0 /HPF (ref 0–2)
RESIDUAL VOLUME (RV) (EXTERNAL): 66
SOURCE: ABNORMAL
SP GR UR STRIP: 1.02 (ref 1–1.03)
UROBILINOGEN UR STRIP-ACNC: 0.2 EU/DL (ref 0.2–1)
WBC #/AREA URNS AUTO: <1 /HPF (ref 0–2)

## 2018-01-02 PROCEDURE — 36415 COLL VENOUS BLD VENIPUNCTURE: CPT | Performed by: PHYSICIAN ASSISTANT

## 2018-01-02 PROCEDURE — 81001 URINALYSIS AUTO W/SCOPE: CPT | Performed by: PHYSICIAN ASSISTANT

## 2018-01-02 PROCEDURE — 82565 ASSAY OF CREATININE: CPT | Performed by: PHYSICIAN ASSISTANT

## 2018-01-02 PROCEDURE — 51798 US URINE CAPACITY MEASURE: CPT | Performed by: PHYSICIAN ASSISTANT

## 2018-01-02 PROCEDURE — 99214 OFFICE O/P EST MOD 30 MIN: CPT | Mod: 25 | Performed by: PHYSICIAN ASSISTANT

## 2018-01-02 ASSESSMENT — PAIN SCALES - GENERAL: PAINLEVEL: NO PAIN (1)

## 2018-01-02 NOTE — MR AVS SNAPSHOT
After Visit Summary   1/2/2018    Manny Pitt    MRN: [id number]           Patient Information     Date Of Birth          1970        Visit Information        Provider Department      1/2/2018 8:30 AM Berna Tee PA-C [facility] [place]        Today's Diagnoses     Urinary frequency           Follow-ups after your visit        Your next 10 appointments already scheduled     Jul 13, 2018 10:30 AM CDT   Return Visit with Berna Tee PA-C   [facility] ([facility])    [address]  [address]  [facility] [phone]              Who to contact     If you have questions or need follow up information about today's clinic visit or your schedule please contact [facility] directly at [phone].  Normal or non-critical lab and imaging results will be communicated to you by Vyuhart, letter or phone within 4 business days after the clinic has received the results. If you do not hear from us within 7 days, please contact the clinic through Vyuhart or phone. If you have a critical or abnormal lab result, we will notify you by phone as soon as possible.  Submit refill requests through SmartHub or call your pharmacy and they will forward the refill request to us. Please allow 3 business days for your refill to be completed.          Additional Information About Your Visit        MyChart Information     SmartHub gives you secure access to your electronic health record. If you see a primary care provider, you can also send messages to your care team and make appointments. If you have questions, please call your primary care clinic.  If you do not have a primary care provider, please call [phone] and they will assist you.        Care EveryWhere ID     This is your Care EveryWhere ID. This could be used by other organizations to access your [place]  "medical records  [id number]        Your Vitals Were     Pulse Height BMI (Body Mass Index)             68 1.88 m (6' 2\") 25.68 kg/m2          Blood Pressure from Last 3 Encounters:   01/02/18 126/82   12/13/17 122/70   11/20/17 146/90    Weight from Last 3 Encounters:   01/02/18 90.7 kg (200 lb)   12/13/17 93 kg (205 lb)   11/20/17 90.7 kg (200 lb)              We Performed the Following     Creatinine [LAB66]     MEASURE POST-VOID RESIDUAL URINE/BLADDER CAPACITY, US NON-IMAGING (04854)     UA without Microscopic [[id number]]     Urine Micro Urologic Phys        Primary Care Provider Office Phone # Fax #    Ranulfo Neff [phone] [address] [place] [address]        Equal Access to Services     [facility]: Hadii ruma valenzuela hadasho Soomaali, waaxda luqadaha, qaybta kaalmada adeegyada, sancho harden haynelli escudero . So [facility] [phone].    ATENCIÓN: Si habla español, tiene a romero disposición servicios gratuitos de asistencia lingüística. Llame al [phone].    We comply with applicable federal civil rights laws and [place] laws. We do not discriminate on the basis of race, color, national origin, age, disability, sex, sexual orientation, or gender identity.            Thank you!     Thank you for choosing [facility] [place]  for your care. Our goal is always to provide you with excellent care. Hearing back from our patients is one way we can continue to improve our services. Please take a few minutes to complete the written survey that you may receive in the mail after your visit with us. Thank you!             Your Updated Medication List - Protect others around you: Learn how to safely use, store and throw away your medicines at www.disposemymeds.org.          This list is accurate as of: 1/2/18  9:48 AM.  Always use your most recent med list.                   Brand Name Dispense Instructions for use Diagnosis    KRILL OIL " PO           meclizine 25 MG tablet    ANTIVERT    60 tablet    Take 1 tablet (25 mg) by mouth 3 times daily as needed    Motion sickness, subsequent encounter       Multi-vitamin Tabs tablet      Take 1 tablet by mouth daily.        psyllium Packet    METAMUCIL/KONSYL     Take 1 packet by mouth daily        tamsulosin 0.4 MG capsule    FLOMAX    90 capsule    Take 1 capsule (0.4 mg) by mouth daily    Benign prostatic hyperplasia with nocturia       TYLENOL PO

## 2018-01-02 NOTE — PROGRESS NOTES
CC: Urinary frequency f/u and foamy urine.    HPI: It is a pleasure to see . Manny Pitt, a pleasant 47 year old male seen today in the urology clinic in follow up for evaluation of the above. Symptoms have been ongoing for 10 years and were progressively worsening (now improved with initiation of Flomax). The patient voids q2-4 hours during the day, nocturia x 0-1. He reports improvement in his emptying. Denies dysuria, gross hematuria, pyuria, incontinence, needing to strain to void, intermittency, history of UTI's or kidney stones. No prior history of prostate or bladder issues. PSA normal.     Noted foamy urine with initiation of a cholesterol med. But, in all honesty this may have been going on and he is just noticing it. Read SE of the med and was nervous that he had protein in his urine or the kidneys were not functioning well.     Drinks several glasses of water daily, has almost eliminated caffeine.     Past Medical History:   Diagnosis Date     Esophageal reflux      Hernia, abdominal      Other malignant neoplasm of skin of lower limb, including hip     BASAL CELL CA S/P RESECTION 2003     Other malignant neoplasm of skin of trunk, except scrotum     BASAL CELL CA S/P RESECTION 2003     Personal history of other disorders of nervous system and sense organs      Unspecified eustachian tube disorder      Past Surgical History:   Procedure Laterality Date     C NONSPECIFIC PROCEDURE      Hernia     COLONOSCOPY  2007     COLONOSCOPY  12/26/2012    Procedure: COLONOSCOPY;  Colonoscopy ;  Surgeon: Ranulfo Ramirez MD;  Location:  GI     COLONOSCOPY N/A 12/20/2016    Procedure: COLONOSCOPY;  Surgeon: Paula Johnson MD;  Location:  GI     HERNIA REPAIR       Current Outpatient Prescriptions   Medication Sig Dispense Refill     tamsulosin (FLOMAX) 0.4 MG capsule Take 1 capsule (0.4 mg) by mouth daily 90 capsule 1     KRILL OIL PO        psyllium (METAMUCIL/KONSYL) Packet Take 1 packet by mouth  "daily       Acetaminophen (TYLENOL PO)        meclizine (ANTIVERT) 25 MG tablet Take 1 tablet (25 mg) by mouth 3 times daily as needed (Patient not taking: Reported on 1/2/2018) 60 tablet 3     multivitamin, therapeutic with minerals (MULTI-VITAMIN) TABS Take 1 tablet by mouth daily.       Allergies   Allergen Reactions     Erythromycin Anaphylaxis     Doxycycline      Severe headache     FAMILY HISTORY: No history of genitourinary carcinoma or urolithiasis.    SOCIAL HISTORY: The patient does not smoke cigarettes, Denies EtOH and illicit drug abuse.      ROS: A comprehensive 14 point ROS was obtained otherwise negative except for that outlined above in the HPI.    PHYSICAL EXAM:   /82 (BP Location: Left arm, Patient Position: Chair, Cuff Size: Adult Regular)  Pulse 68  Ht 1.88 m (6' 2\")  Wt 90.7 kg (200 lb)  BMI 25.68 kg/m2    GENERAL: Well groomed/well developed/well nourished male in NAD.  HEENT: EOMI, AT, NC.  SKIN: Warm to touch, dry.  No visible rashes or lesions.  RESP: No increased respiratory effort.  LYMPH: No LE edema.  ABD: Soft, NT, ND.  No CVAT.    MS: Full ROM in extremities.  CHINEDU: Deferred for check of PSA.  NEURO: Alert and oriented x 3.  PSYCH: Normal mood and affect, pleasant and agreeable during interview and exam.    Orders Only on 11/03/2017   Component Date Value Ref Range Status     Sodium 11/03/2017 139  133 - 144 mmol/L Final     Potassium 11/03/2017 3.6  3.4 - 5.3 mmol/L Final     Chloride 11/03/2017 105  94 - 109 mmol/L Final     Carbon Dioxide 11/03/2017 26  20 - 32 mmol/L Final     Anion Gap 11/03/2017 8  3 - 14 mmol/L Final     Glucose 11/03/2017 82  70 - 99 mg/dL Final    Fasting specimen     Urea Nitrogen 11/03/2017 12  7 - 30 mg/dL Final     Creatinine 11/03/2017 0.94  0.66 - 1.25 mg/dL Final     GFR Estimate 11/03/2017 86  >60 mL/min/1.7m2 Final    Non  GFR Calc     GFR Estimate If Black 11/03/2017 >90  >60 mL/min/1.7m2 Final     GFR Calc "     Calcium 11/03/2017 9.0  8.5 - 10.1 mg/dL Final     Bilirubin Total 11/03/2017 0.5  0.2 - 1.3 mg/dL Final     Albumin 11/03/2017 3.8  3.4 - 5.0 g/dL Final     Protein Total 11/03/2017 7.8  6.8 - 8.8 g/dL Final     Alkaline Phosphatase 11/03/2017 73  40 - 150 U/L Final     ALT 11/03/2017 28  0 - 70 U/L Final     AST 11/03/2017 15  0 - 45 U/L Final     Cholesterol 11/03/2017 239* <200 mg/dL Final    Desirable:       <200 mg/dl     Triglycerides 11/03/2017 169* <150 mg/dL Final    Comment: Borderline high:  150-199 mg/dl  High:             200-499 mg/dl  Very high:       >499 mg/dl  Fasting specimen       HDL Cholesterol 11/03/2017 50  >39 mg/dL Final     LDL Cholesterol Calculated 11/03/2017 155* <100 mg/dL Final    Comment: Above desirable:  100-129 mg/dl  Borderline High:  130-159 mg/dL  High:             160-189 mg/dL  Very high:       >189 mg/dl       Non HDL Cholesterol 11/03/2017 189* <130 mg/dL Final    Comment: Above Desirable:  130-159 mg/dl  Borderline high:  160-189 mg/dl  High:             190-219 mg/dl  Very high:       >219 mg/dl       PVR: 66mL    ASSESSMENT/PLAN:  Mr. Manny Pitt is a 47 year old male with symptoms consistent with BPH w/ LUTS, improved on Flomax.   Frothy urine, no protein on dip.   -Micro  - Cr today  - Trial Flomax. Rx sent to patient's pharmacy. Side effects discussed.  -Will consider OAB if no improvement with Flomax    Follow up in 6 months for CHINEDU, an AUA symptom score, PVR and reassessment.  Call or RTC sooner with any issues.     Video urodynamics and/or cystoscopic evaluation would be the next appropriate diagnostic steps should Mr. Pitt fail conservative therapy.      DOUGLAS Hamlin [facility]    20 minutes were spent with the patient today, > 50% in counseling and coordination of care.

## 2018-01-02 NOTE — LETTER
1/2/2018      RE: Manny Pitt  [address] [place] [address]       CC: Urinary frequency f/u and foamy urine.    HPI: It is a pleasure to see . Manny Pitt, a pleasant 47 year old male seen today in the urology clinic in follow up for evaluation of the above. Symptoms have been ongoing for 10 years and were progressively worsening (now improved with initiation of Flomax). The patient voids q2-4 hours during the day, nocturia x 0-1. He reports improvement in his emptying. Denies dysuria, gross hematuria, pyuria, incontinence, needing to strain to void, intermittency, history of UTI's or kidney stones. No prior history of prostate or bladder issues. PSA normal.     Noted foamy urine with initiation of a cholesterol med. But, in all honesty this may have been going on and he is just noticing it. Read SE of the med and was nervous that he had protein in his urine or the kidneys were not functioning well.     Drinks several glasses of water daily, has almost eliminated caffeine.     Past Medical History:   Diagnosis Date     Esophageal reflux      Hernia, abdominal      Other malignant neoplasm of skin of lower limb, including hip     BASAL CELL CA S/P RESECTION 2003     Other malignant neoplasm of skin of trunk, except scrotum     BASAL CELL CA S/P RESECTION 2003     Personal history of other disorders of nervous system and sense organs      Unspecified eustachian tube disorder      Past Surgical History:   Procedure Laterality Date     C NONSPECIFIC PROCEDURE      Hernia     COLONOSCOPY  2007     COLONOSCOPY  12/26/2012    Procedure: COLONOSCOPY;  Colonoscopy ;  Surgeon: Ranulfo Ramirez MD;  Location:  GI     COLONOSCOPY N/A 12/20/2016    Procedure: COLONOSCOPY;  Surgeon: Paula Johnson MD;  Location:  GI     HERNIA REPAIR       Current Outpatient Prescriptions   Medication Sig Dispense Refill     tamsulosin (FLOMAX) 0.4 MG capsule Take 1 capsule (0.4 mg) by mouth daily 90 capsule 1      "KRILL OIL PO        psyllium (METAMUCIL/KONSYL) Packet Take 1 packet by mouth daily       Acetaminophen (TYLENOL PO)        meclizine (ANTIVERT) 25 MG tablet Take 1 tablet (25 mg) by mouth 3 times daily as needed (Patient not taking: Reported on 1/2/2018) 60 tablet 3     multivitamin, therapeutic with minerals (MULTI-VITAMIN) TABS Take 1 tablet by mouth daily.       Allergies   Allergen Reactions     Erythromycin Anaphylaxis     Doxycycline      Severe headache     FAMILY HISTORY: No history of genitourinary carcinoma or urolithiasis.    SOCIAL HISTORY: The patient does not smoke cigarettes, Denies EtOH and illicit drug abuse.      ROS: A comprehensive 14 point ROS was obtained otherwise negative except for that outlined above in the HPI.    PHYSICAL EXAM:   /82 (BP Location: Left arm, Patient Position: Chair, Cuff Size: Adult Regular)  Pulse 68  Ht 1.88 m (6' 2\")  Wt 90.7 kg (200 lb)  BMI 25.68 kg/m2    GENERAL: Well groomed/well developed/well nourished male in NAD.  HEENT: EOMI, AT, NC.  SKIN: Warm to touch, dry.  No visible rashes or lesions.  RESP: No increased respiratory effort.  LYMPH: No LE edema.  ABD: Soft, NT, ND.  No CVAT.    MS: Full ROM in extremities.  CHINEDU: Deferred for check of PSA.  NEURO: Alert and oriented x 3.  PSYCH: Normal mood and affect, pleasant and agreeable during interview and exam.    Orders Only on 11/03/2017   Component Date Value Ref Range Status     Sodium 11/03/2017 139  133 - 144 mmol/L Final     Potassium 11/03/2017 3.6  3.4 - 5.3 mmol/L Final     Chloride 11/03/2017 105  94 - 109 mmol/L Final     Carbon Dioxide 11/03/2017 26  20 - 32 mmol/L Final     Anion Gap 11/03/2017 8  3 - 14 mmol/L Final     Glucose 11/03/2017 82  70 - 99 mg/dL Final    Fasting specimen     Urea Nitrogen 11/03/2017 12  7 - 30 mg/dL Final     Creatinine 11/03/2017 0.94  0.66 - 1.25 mg/dL Final     GFR Estimate 11/03/2017 86  >60 mL/min/1.7m2 Final    Non  GFR Calc     GFR Estimate " If Black 11/03/2017 >90  >60 mL/min/1.7m2 Final    African American GFR Calc     Calcium 11/03/2017 9.0  8.5 - 10.1 mg/dL Final     Bilirubin Total 11/03/2017 0.5  0.2 - 1.3 mg/dL Final     Albumin 11/03/2017 3.8  3.4 - 5.0 g/dL Final     Protein Total 11/03/2017 7.8  6.8 - 8.8 g/dL Final     Alkaline Phosphatase 11/03/2017 73  40 - 150 U/L Final     ALT 11/03/2017 28  0 - 70 U/L Final     AST 11/03/2017 15  0 - 45 U/L Final     Cholesterol 11/03/2017 239* <200 mg/dL Final    Desirable:       <200 mg/dl     Triglycerides 11/03/2017 169* <150 mg/dL Final    Comment: Borderline high:  150-199 mg/dl  High:             200-499 mg/dl  Very high:       >499 mg/dl  Fasting specimen       HDL Cholesterol 11/03/2017 50  >39 mg/dL Final     LDL Cholesterol Calculated 11/03/2017 155* <100 mg/dL Final    Comment: Above desirable:  100-129 mg/dl  Borderline High:  130-159 mg/dL  High:             160-189 mg/dL  Very high:       >189 mg/dl       Non HDL Cholesterol 11/03/2017 189* <130 mg/dL Final    Comment: Above Desirable:  130-159 mg/dl  Borderline high:  160-189 mg/dl  High:             190-219 mg/dl  Very high:       >219 mg/dl       PVR: 66mL    ASSESSMENT/PLAN:  Mr. Manny Pitt is a 47 year old male with symptoms consistent with BPH w/ LUTS, improved on Flomax.   Frothy urine, no protein on dip.   -Micro  - Cr today  - Trial Flomax. Rx sent to patient's pharmacy. Side effects discussed.  -Will consider OAB if no improvement with Flomax    Follow up in 6 months for CHINEDU, an AUA symptom score, PVR and reassessment.  Call or RTC sooner with any issues.     Video urodynamics and/or cystoscopic evaluation would be the next appropriate diagnostic steps should Mr. Pitt fail conservative therapy.      Berna Tee PA-C  [facility]    20 minutes were spent with the patient today, > 50% in counseling and coordination of care.

## 2018-03-20 ENCOUNTER — MYC REFILL (OUTPATIENT)
Dept: UROLOGY | Facility: CLINIC | Age: 48
End: 2018-03-20

## 2018-03-20 DIAGNOSIS — N40.1 BENIGN PROSTATIC HYPERPLASIA WITH NOCTURIA: ICD-10-CM

## 2018-03-20 DIAGNOSIS — R35.1 BENIGN PROSTATIC HYPERPLASIA WITH NOCTURIA: ICD-10-CM

## 2018-03-20 RX ORDER — TAMSULOSIN HYDROCHLORIDE 0.4 MG/1
0.4 CAPSULE ORAL DAILY
Qty: 90 CAPSULE | Refills: 2 | Status: SHIPPED | OUTPATIENT
Start: 2018-03-20 | End: 2018-11-26

## 2018-07-10 DIAGNOSIS — R35.0 URINARY FREQUENCY: Primary | ICD-10-CM

## 2018-07-16 ENCOUNTER — OFFICE VISIT (OUTPATIENT)
Dept: UROLOGY | Facility: CLINIC | Age: 48
End: 2018-07-16
Payer: COMMERCIAL

## 2018-07-16 VITALS
BODY MASS INDEX: 25.67 KG/M2 | HEIGHT: 74 IN | WEIGHT: 200 LBS | SYSTOLIC BLOOD PRESSURE: 130 MMHG | DIASTOLIC BLOOD PRESSURE: 84 MMHG

## 2018-07-16 DIAGNOSIS — N40.1 BENIGN PROSTATIC HYPERPLASIA WITH LOWER URINARY TRACT SYMPTOMS, SYMPTOM DETAILS UNSPECIFIED: ICD-10-CM

## 2018-07-16 DIAGNOSIS — N40.1 BENIGN PROSTATIC HYPERPLASIA WITH LOWER URINARY TRACT SYMPTOMS, SYMPTOM DETAILS UNSPECIFIED: Primary | ICD-10-CM

## 2018-07-16 LAB
ALBUMIN UR-MCNC: NEGATIVE MG/DL
APPEARANCE UR: CLEAR
BILIRUB UR QL STRIP: NEGATIVE
COLOR UR AUTO: YELLOW
GLUCOSE UR STRIP-MCNC: NEGATIVE MG/DL
HGB UR QL STRIP: ABNORMAL
KETONES UR STRIP-MCNC: NEGATIVE MG/DL
LEUKOCYTE ESTERASE UR QL STRIP: NEGATIVE
MUCOUS THREADS #/AREA URNS LPF: PRESENT /LPF
NITRATE UR QL: NEGATIVE
PH UR STRIP: 6 PH (ref 5–7)
RBC #/AREA URNS AUTO: <1 /HPF (ref 0–2)
SOURCE: ABNORMAL
SP GR UR STRIP: 1.02 (ref 1–1.03)
UROBILINOGEN UR STRIP-ACNC: 0.2 EU/DL (ref 0.2–1)
WBC #/AREA URNS AUTO: <1 /HPF (ref 0–5)

## 2018-07-16 PROCEDURE — 99213 OFFICE O/P EST LOW 20 MIN: CPT | Mod: 25 | Performed by: PHYSICIAN ASSISTANT

## 2018-07-16 PROCEDURE — 51798 US URINE CAPACITY MEASURE: CPT | Performed by: PHYSICIAN ASSISTANT

## 2018-07-16 PROCEDURE — 81003 URINALYSIS AUTO W/O SCOPE: CPT | Performed by: PHYSICIAN ASSISTANT

## 2018-07-16 ASSESSMENT — PAIN SCALES - GENERAL: PAINLEVEL: NO PAIN (0)

## 2018-07-16 NOTE — PROGRESS NOTES
CC: Urinary frequency f/u and foamy urine.    HPI: It is a pleasure to see . Manny Pitt, a pleasant 48 year old male seen today in the urology clinic in follow up for evaluation of the above. Symptoms have been ongoing for 10 years and were progressively worsening (now improved with initiation of Flomax). The patient voids q2-4 hours during the day, nocturia x 0-1. He reports improvement in his emptying. Denies dysuria, gross hematuria, pyuria, incontinence, needing to strain to void, intermittency, history of UTI's or kidney stones. No prior history of prostate or bladder issues. PSA normal.     Drinks several glasses of water daily, has almost eliminated caffeine. Has not been consistent with his Flomax for the past several months.     Past Medical History:   Diagnosis Date     Esophageal reflux      Hernia, abdominal      Other malignant neoplasm of skin of lower limb, including hip     BASAL CELL CA S/P RESECTION 2003     Other malignant neoplasm of skin of trunk, except scrotum     BASAL CELL CA S/P RESECTION 2003     Personal history of other disorders of nervous system and sense organs      Unspecified eustachian tube disorder      Past Surgical History:   Procedure Laterality Date     C NONSPECIFIC PROCEDURE      Hernia     COLONOSCOPY  2007     COLONOSCOPY  12/26/2012    Procedure: COLONOSCOPY;  Colonoscopy ;  Surgeon: Ranulfo Ramirez MD;  Location:  GI     COLONOSCOPY N/A 12/20/2016    Procedure: COLONOSCOPY;  Surgeon: Paula Johnson MD;  Location:  GI     HERNIA REPAIR       Current Outpatient Prescriptions   Medication Sig Dispense Refill     psyllium (METAMUCIL/KONSYL) Packet Take 1 packet by mouth daily       Acetaminophen (TYLENOL PO)        KRILL OIL PO        meclizine (ANTIVERT) 25 MG tablet Take 1 tablet (25 mg) by mouth 3 times daily as needed (Patient not taking: Reported on 1/2/2018) 60 tablet 3     multivitamin, therapeutic with minerals (MULTI-VITAMIN) TABS Take 1 tablet  "by mouth daily.       tamsulosin (FLOMAX) 0.4 MG capsule Take 1 capsule (0.4 mg) by mouth daily (Patient not taking: Reported on 7/16/2018) 90 capsule 2     Allergies   Allergen Reactions     Erythromycin Anaphylaxis     Doxycycline      Severe headache     FAMILY HISTORY: No history of genitourinary carcinoma or urolithiasis.    SOCIAL HISTORY: The patient does not smoke cigarettes, Denies EtOH and illicit drug abuse.      ROS: A comprehensive 14 point ROS was obtained otherwise negative except for that outlined above in the HPI.    PHYSICAL EXAM:   /84  Ht 1.88 m (6' 2\")  Wt 90.7 kg (200 lb)  BMI 25.68 kg/m2    GENERAL: Well groomed/well developed/well nourished male in NAD.  HEENT: EOMI, AT, NC.  SKIN: Warm to touch, dry.  No visible rashes or lesions.  RESP: No increased respiratory effort.  LYMPH: No LE edema.  ABD: Soft, NT, ND.  No CVAT.    MS: Full ROM in extremities.  CHINEDU: normal tone, no masses, (medium) prostate without nodules or tenderness.  NEURO: Alert and oriented x 3.  PSYCH: Normal mood and affect, pleasant and agreeable during interview and exam.    Orders Only on 11/03/2017   Component Date Value Ref Range Status     Sodium 11/03/2017 139  133 - 144 mmol/L Final     Potassium 11/03/2017 3.6  3.4 - 5.3 mmol/L Final     Chloride 11/03/2017 105  94 - 109 mmol/L Final     Carbon Dioxide 11/03/2017 26  20 - 32 mmol/L Final     Anion Gap 11/03/2017 8  3 - 14 mmol/L Final     Glucose 11/03/2017 82  70 - 99 mg/dL Final    Fasting specimen     Urea Nitrogen 11/03/2017 12  7 - 30 mg/dL Final     Creatinine 11/03/2017 0.94  0.66 - 1.25 mg/dL Final     GFR Estimate 11/03/2017 86  >60 mL/min/1.7m2 Final    Non  GFR Calc     GFR Estimate If Black 11/03/2017 >90  >60 mL/min/1.7m2 Final    African American GFR Calc     Calcium 11/03/2017 9.0  8.5 - 10.1 mg/dL Final     Bilirubin Total 11/03/2017 0.5  0.2 - 1.3 mg/dL Final     Albumin 11/03/2017 3.8  3.4 - 5.0 g/dL Final     Protein Total " 11/03/2017 7.8  6.8 - 8.8 g/dL Final     Alkaline Phosphatase 11/03/2017 73  40 - 150 U/L Final     ALT 11/03/2017 28  0 - 70 U/L Final     AST 11/03/2017 15  0 - 45 U/L Final     Cholesterol 11/03/2017 239* <200 mg/dL Final    Desirable:       <200 mg/dl     Triglycerides 11/03/2017 169* <150 mg/dL Final    Comment: Borderline high:  150-199 mg/dl  High:             200-499 mg/dl  Very high:       >499 mg/dl  Fasting specimen       HDL Cholesterol 11/03/2017 50  >39 mg/dL Final     LDL Cholesterol Calculated 11/03/2017 155* <100 mg/dL Final    Comment: Above desirable:  100-129 mg/dl  Borderline High:  130-159 mg/dL  High:             160-189 mg/dL  Very high:       >189 mg/dl       Non HDL Cholesterol 11/03/2017 189* <130 mg/dL Final    Comment: Above Desirable:  130-159 mg/dl  Borderline high:  160-189 mg/dl  High:             190-219 mg/dl  Very high:       >219 mg/dl       PVR: 66mL    ASSESSMENT/PLAN:  Mr. Manny Pitt is a 47 year old male with symptoms consistent with BPH w/ LUTS, improved when he was on Flomax.   - Eliminate bladder irritants   -Micro  - Back on Flomax.   -Update me in 3 months, if no improvement, would refer for cysto.     DOUGLAS Hamlin [facility]    20 minutes were spent with the patient today, > 50% in counseling and coordination of care of BPH.

## 2018-07-16 NOTE — MR AVS SNAPSHOT
After Visit Summary   7/16/2018    Manny Pitt    MRN: [id number]           Patient Information     Date Of Birth          1970        Visit Information        Provider Department      7/16/2018 4:00 PM Berna Tee PA-C [facility] [place]        Today's Diagnoses     Benign prostatic hyperplasia with lower urinary tract symptoms, symptom details unspecified          Care Instructions    Send me an update in 3 months. If symptoms are still bothersome, I will schedule you with MD and have a cystoscopy.             Follow-ups after your visit        Who to contact     If you have questions or need follow up information about today's clinic visit or your schedule please contact [facility] [place] directly at [phone].  Normal or non-critical lab and imaging results will be communicated to you by [employer], letter or phone within 4 business days after the clinic has received the results. If you do not hear from us within 7 days, please contact the clinic through [employer] or phone. If you have a critical or abnormal lab result, we will notify you by phone as soon as possible.  Submit refill requests through [employer] or call your pharmacy and they will forward the refill request to us. Please allow 3 business days for your refill to be completed.          Additional Information About Your Visit        MyChart Information     [employer] gives you secure access to your electronic health record. If you see a primary care provider, you can also send messages to your care team and make appointments. If you have questions, please call your primary care clinic.  If you do not have a primary care provider, please call [phone] and they will assist you.        Care EveryWhere ID     This is your Care EveryWhere ID. This could be used by other organizations to access your [facility] medical records  [id number]        Your Vitals Were      "Height BMI (Body Mass Index)                1.88 m (6' 2\") 25.68 kg/m2           Blood Pressure from Last 3 Encounters:   07/16/18 130/84   01/02/18 126/82   12/13/17 122/70    Weight from Last 3 Encounters:   07/16/18 90.7 kg (200 lb)   01/02/18 90.7 kg (200 lb)   12/13/17 93 kg (205 lb)              We Performed the Following     MEASURE POST-VOID RESIDUAL URINE/BLADDER CAPACITY, US NON-IMAGING     UA without Microscopic        Primary Care Provider Office Phone # Fax #    Ranulfo Neff [phone] [address] [place] [address]        Equal Access to Services     [facility]: Hadii ruma Garcia, waaxda luqadaha, qaybta kaalmada adeegyada, sancho escudero . So [facility] [phone].    ATENCIÓN: Si habla español, tiene a romero disposición servicios gratuitos de asistencia lingüística. Llame al [phone].    We comply with applicable federal civil rights laws and [place] laws. We do not discriminate on the basis of race, color, national origin, age, disability, sex, sexual orientation, or gender identity.            Thank you!     Thank you for choosing [facility] [place]  for your care. Our goal is always to provide you with excellent care. Hearing back from our patients is one way we can continue to improve our services. Please take a few minutes to complete the written survey that you may receive in the mail after your visit with us. Thank you!             Your Updated Medication List - Protect others around you: Learn how to safely use, store and throw away your medicines at www.disposemymeds.org.          This list is accurate as of 7/16/18  4:14 PM.  Always use your most recent med list.                   Brand Name Dispense Instructions for use Diagnosis    KRILL OIL PO           meclizine 25 MG tablet    ANTIVERT    60 tablet    Take 1 tablet (25 mg) by mouth 3 times daily as needed    Motion " sickness, subsequent encounter       Multi-vitamin Tabs tablet      Take 1 tablet by mouth daily.        psyllium Packet    METAMUCIL/KONSYL     Take 1 packet by mouth daily        tamsulosin 0.4 MG capsule    FLOMAX    90 capsule    Take 1 capsule (0.4 mg) by mouth daily    Benign prostatic hyperplasia with nocturia       TYLENOL PO

## 2018-07-16 NOTE — PATIENT INSTRUCTIONS
Send me an update in 3 months. If symptoms are still bothersome, I will schedule you with MD to have a cystoscopy.

## 2018-07-16 NOTE — LETTER
7/16/2018       RE: Manny Pitt  [address] [place] [address]     Dear Colleague,    Thank you for referring your patient, Manny Pitt, to the [facility]OLOG[facility] [place] at [facility]. Please see a copy of my visit note below.    CC: Urinary frequency f/u and foamy urine.    HPI: It is a pleasure to see . Manny Pitt, a pleasant 48 year old male seen today in the urology clinic in follow up for evaluation of the above. Symptoms have been ongoing for 10 years and were progressively worsening (now improved with initiation of Flomax). The patient voids q2-4 hours during the day, nocturia x 0-1. He reports improvement in his emptying. Denies dysuria, gross hematuria, pyuria, incontinence, needing to strain to void, intermittency, history of UTI's or kidney stones. No prior history of prostate or bladder issues. PSA normal.     Drinks several glasses of water daily, has almost eliminated caffeine. Has not been consistent with his Flomax for the past several months.     Past Medical History:   Diagnosis Date     Esophageal reflux      Hernia, abdominal      Other malignant neoplasm of skin of lower limb, including hip     BASAL CELL CA S/P RESECTION 2003     Other malignant neoplasm of skin of trunk, except scrotum     BASAL CELL CA S/P RESECTION 2003     Personal history of other disorders of nervous system and sense organs      Unspecified eustachian tube disorder      Past Surgical History:   Procedure Laterality Date     C NONSPECIFIC PROCEDURE      Hernia     COLONOSCOPY  2007     COLONOSCOPY  12/26/2012    Procedure: COLONOSCOPY;  Colonoscopy ;  Surgeon: Ranulfo Ramirez MD;  Location:  GI     COLONOSCOPY N/A 12/20/2016    Procedure: COLONOSCOPY;  Surgeon: Paula Johnson MD;  Location:  GI     HERNIA REPAIR       Current Outpatient Prescriptions   Medication Sig Dispense Refill     psyllium (METAMUCIL/KONSYL) Packet Take 1  "packet by mouth daily       Acetaminophen (TYLENOL PO)        KRILL OIL PO        meclizine (ANTIVERT) 25 MG tablet Take 1 tablet (25 mg) by mouth 3 times daily as needed (Patient not taking: Reported on 1/2/2018) 60 tablet 3     multivitamin, therapeutic with minerals (MULTI-VITAMIN) TABS Take 1 tablet by mouth daily.       tamsulosin (FLOMAX) 0.4 MG capsule Take 1 capsule (0.4 mg) by mouth daily (Patient not taking: Reported on 7/16/2018) 90 capsule 2     Allergies   Allergen Reactions     Erythromycin Anaphylaxis     Doxycycline      Severe headache     FAMILY HISTORY: No history of genitourinary carcinoma or urolithiasis.    SOCIAL HISTORY: The patient does not smoke cigarettes, Denies EtOH and illicit drug abuse.      ROS: A comprehensive 14 point ROS was obtained otherwise negative except for that outlined above in the HPI.    PHYSICAL EXAM:   /84  Ht 1.88 m (6' 2\")  Wt 90.7 kg (200 lb)  BMI 25.68 kg/m2    GENERAL: Well groomed/well developed/well nourished male in NAD.  HEENT: EOMI, AT, NC.  SKIN: Warm to touch, dry.  No visible rashes or lesions.  RESP: No increased respiratory effort.  LYMPH: No LE edema.  ABD: Soft, NT, ND.  No CVAT.    MS: Full ROM in extremities.  CHINEDU: normal tone, no masses, (medium) prostate without nodules or tenderness.  NEURO: Alert and oriented x 3.  PSYCH: Normal mood and affect, pleasant and agreeable during interview and exam.    Orders Only on 11/03/2017   Component Date Value Ref Range Status     Sodium 11/03/2017 139  133 - 144 mmol/L Final     Potassium 11/03/2017 3.6  3.4 - 5.3 mmol/L Final     Chloride 11/03/2017 105  94 - 109 mmol/L Final     Carbon Dioxide 11/03/2017 26  20 - 32 mmol/L Final     Anion Gap 11/03/2017 8  3 - 14 mmol/L Final     Glucose 11/03/2017 82  70 - 99 mg/dL Final    Fasting specimen     Urea Nitrogen 11/03/2017 12  7 - 30 mg/dL Final     Creatinine 11/03/2017 0.94  0.66 - 1.25 mg/dL Final     GFR Estimate 11/03/2017 86  >60 mL/min/1.7m2 " Final    Non  GFR Calc     GFR Estimate If Black 11/03/2017 >90  >60 mL/min/1.7m2 Final    African American GFR Calc     Calcium 11/03/2017 9.0  8.5 - 10.1 mg/dL Final     Bilirubin Total 11/03/2017 0.5  0.2 - 1.3 mg/dL Final     Albumin 11/03/2017 3.8  3.4 - 5.0 g/dL Final     Protein Total 11/03/2017 7.8  6.8 - 8.8 g/dL Final     Alkaline Phosphatase 11/03/2017 73  40 - 150 U/L Final     ALT 11/03/2017 28  0 - 70 U/L Final     AST 11/03/2017 15  0 - 45 U/L Final     Cholesterol 11/03/2017 239* <200 mg/dL Final    Desirable:       <200 mg/dl     Triglycerides 11/03/2017 169* <150 mg/dL Final    Comment: Borderline high:  150-199 mg/dl  High:             200-499 mg/dl  Very high:       >499 mg/dl  Fasting specimen       HDL Cholesterol 11/03/2017 50  >39 mg/dL Final     LDL Cholesterol Calculated 11/03/2017 155* <100 mg/dL Final    Comment: Above desirable:  100-129 mg/dl  Borderline High:  130-159 mg/dL  High:             160-189 mg/dL  Very high:       >189 mg/dl       Non HDL Cholesterol 11/03/2017 189* <130 mg/dL Final    Comment: Above Desirable:  130-159 mg/dl  Borderline high:  160-189 mg/dl  High:             190-219 mg/dl  Very high:       >219 mg/dl       PVR: 66mL    ASSESSMENT/PLAN:  Mr. Manny Pitt is a 47 year old male with symptoms consistent with BPH w/ LUTS, improved when he was on Flomax.   - Eliminate bladder irritants   -Micro  - Back on Flomax.   -Update me in 3 months, if no improvement, would refer for cysto.     DOUGLAS Hamlin [facility]    20 minutes were spent with the patient today, > 50% in counseling and coordination of care of BPH.

## 2018-11-21 DIAGNOSIS — E78.5 HYPERLIPIDEMIA LDL GOAL <160: ICD-10-CM

## 2018-11-21 LAB
CHOLEST SERPL-MCNC: 202 MG/DL
HDLC SERPL-MCNC: 46 MG/DL
LDLC SERPL CALC-MCNC: 135 MG/DL
NONHDLC SERPL-MCNC: 156 MG/DL
TRIGL SERPL-MCNC: 105 MG/DL

## 2018-11-21 PROCEDURE — 36415 COLL VENOUS BLD VENIPUNCTURE: CPT | Performed by: INTERNAL MEDICINE

## 2018-11-21 PROCEDURE — 80061 LIPID PANEL: CPT | Performed by: INTERNAL MEDICINE

## 2018-11-26 ENCOUNTER — OFFICE VISIT (OUTPATIENT)
Dept: PEDIATRICS | Facility: CLINIC | Age: 48
End: 2018-11-26
Payer: COMMERCIAL

## 2018-11-26 VITALS
SYSTOLIC BLOOD PRESSURE: 108 MMHG | WEIGHT: 193 LBS | DIASTOLIC BLOOD PRESSURE: 64 MMHG | HEIGHT: 74 IN | BODY MASS INDEX: 24.77 KG/M2 | HEART RATE: 58 BPM | TEMPERATURE: 98.6 F | OXYGEN SATURATION: 96 %

## 2018-11-26 DIAGNOSIS — C44.91: ICD-10-CM

## 2018-11-26 DIAGNOSIS — C44.320 SCC (SQUAMOUS CELL CARCINOMA), FACE: ICD-10-CM

## 2018-11-26 DIAGNOSIS — E78.5 HYPERLIPIDEMIA LDL GOAL <160: ICD-10-CM

## 2018-11-26 DIAGNOSIS — Z23 NEED FOR TETANUS BOOSTER: ICD-10-CM

## 2018-11-26 DIAGNOSIS — Z00.01 ENCOUNTER FOR ROUTINE ADULT HEALTH EXAMINATION WITH ABNORMAL FINDINGS: Primary | ICD-10-CM

## 2018-11-26 DIAGNOSIS — N40.1 BENIGN PROSTATIC HYPERPLASIA WITH LOWER URINARY TRACT SYMPTOMS, SYMPTOM DETAILS UNSPECIFIED: ICD-10-CM

## 2018-11-26 PROCEDURE — 99396 PREV VISIT EST AGE 40-64: CPT | Mod: 25 | Performed by: INTERNAL MEDICINE

## 2018-11-26 PROCEDURE — 90715 TDAP VACCINE 7 YRS/> IM: CPT | Performed by: INTERNAL MEDICINE

## 2018-11-26 PROCEDURE — 90471 IMMUNIZATION ADMIN: CPT | Performed by: INTERNAL MEDICINE

## 2018-11-26 RX ORDER — TERAZOSIN 1 MG/1
1 CAPSULE ORAL AT BEDTIME
Qty: 30 CAPSULE | Refills: 1 | Status: SHIPPED | OUTPATIENT
Start: 2018-11-26 | End: 2019-04-23

## 2018-11-26 ASSESSMENT — ENCOUNTER SYMPTOMS
DIARRHEA: 0
FEVER: 0
ARTHRALGIAS: 0
EYE PAIN: 1
NAUSEA: 0
DYSURIA: 0
CHILLS: 0
COUGH: 0
PALPITATIONS: 0
WEAKNESS: 0
MYALGIAS: 0
ABDOMINAL PAIN: 0
HEMATURIA: 0
FREQUENCY: 1
JOINT SWELLING: 0
SORE THROAT: 0
HEARTBURN: 0
CONSTIPATION: 0
SHORTNESS OF BREATH: 0
PARESTHESIAS: 0
HEADACHES: 0
HEMATOCHEZIA: 0
DIZZINESS: 0

## 2018-11-26 NOTE — MR AVS SNAPSHOT
After Visit Summary   11/26/2018    Manny Pitt    MRN: [id number]           Patient Information     Date Of Birth          1970        Visit Information        Provider Department      11/26/2018 11:00 AM Ranulfo Neff MD [facility]        Today's Diagnoses     Encounter for routine adult health examination with abnormal findings    -  1    Benign prostatic hyperplasia with lower urinary tract symptoms, symptom details unspecified        Hyperlipidemia LDL goal <160        Recurrent basal cell carcinoma of skin        Need for tetanus booster          Care Instructions    INSTRUCTIONS FOR TODAY:     no fluids within 2 hours of bedtime   if no improvement, start terazosin 1mg each evening and follow-up in 2-3 weeks if not better     Dr Neff    Preventive Health Recommendations  Male Ages 50 - 64    Yearly exam:             See your health care provider every year in order to  o   Review health changes.   o   Discuss preventive care.    o   Review your medicines if your doctor has prescribed any.     Have a cholesterol test every 5 years, or more frequently if you are at risk for high cholesterol/heart disease.     Have a diabetes test (fasting glucose) every three years. If you are at risk for diabetes, you should have this test more often.     Have a colonoscopy at age 50, or have a yearly FIT test (stool test). These exams will check for colon cancer.      Talk with your health care provider about whether or not a prostate cancer screening test (PSA) is right for you.    You should be tested each year for STDs (sexually transmitted diseases), if you re at risk.     Shots: Get a flu shot each year. Get a tetanus shot every 10 years.     Nutrition:    Eat at least 5 servings of fruits and vegetables daily.     Eat whole-grain bread, whole-wheat pasta and brown rice instead of white grains and rice.     Get adequate Calcium and Vitamin D.     Lifestyle    Exercise for at  "least 150 minutes a week (30 minutes a day, 5 days a week). This will help you control your weight and prevent disease.     Limit alcohol to one drink per day.     No smoking.     Wear sunscreen to prevent skin cancer.     See your dentist every six months for an exam and cleaning.     See your eye doctor every 1 to 2 years.            Follow-ups after your visit        Who to contact     If you have questions or need follow up information about today's clinic visit or your schedule please contact [facility] directly at [phone].  Normal or non-critical lab and imaging results will be communicated to you by [employer], letter or phone within 4 business days after the clinic has received the results. If you do not hear from us within 7 days, please contact the clinic through [employer] or phone. If you have a critical or abnormal lab result, we will notify you by phone as soon as possible.  Submit refill requests through [employer] or call your pharmacy and they will forward the refill request to us. Please allow 3 business days for your refill to be completed.          Additional Information About Your Visit        [employer] Information     [employer] gives you secure access to your electronic health record. If you see a primary care provider, you can also send messages to your care team and make appointments. If you have questions, please call your primary care clinic.  If you do not have a primary care provider, please call [phone] and they will assist you.        Care EveryWhere ID     This is your Care EveryWhere ID. This could be used by other organizations to access your [place] medical records  [id number]        Your Vitals Were     Pulse Temperature Height Pulse Oximetry BMI (Body Mass Index)       58 98.6  F (37  C) (Oral) 6' 2\" (1.88 m) 96% 24.78 kg/m2        Blood Pressure from Last 3 Encounters:   11/26/18 108/64   07/16/18 130/84   01/02/18 126/82    Weight from Last 3 Encounters:   11/26/18 " 193 lb (87.5 kg)   07/16/18 200 lb (90.7 kg)   01/02/18 200 lb (90.7 kg)              We Performed the Following     TDAP, IM (10 - 64 YRS) - Adacel          Today's Medication Changes          These changes are accurate as of 11/26/18 11:33 AM.  If you have any questions, ask your nurse or doctor.               Start taking these medicines.        Dose/Directions    terazosin 1 MG capsule   Commonly known as:  HYTRIN   Used for:  Benign prostatic hyperplasia with lower urinary tract symptoms, symptom details unspecified   Started by:  Ranulfo Neff MD        Dose:  1 mg   Take 1 capsule (1 mg) by mouth At Bedtime   Quantity:  30 capsule   Refills:  1         Stop taking these medicines if you haven't already. Please contact your care team if you have questions.     KRILL OIL PO   Stopped by:  Ranulfo Neff MD           meclizine 25 MG tablet   Commonly known as:  ANTIVERT   Stopped by:  Ranulfo Neff MD           tamsulosin 0.4 MG capsule   Commonly known as:  FLOMAX   Stopped by:  Ranulfo Neff MD                Where to get your medicines      Some of these will need a paper prescription and others can be bought over the counter.  Ask your nurse if you have questions.     Bring a paper prescription for each of these medications     terazosin 1 MG capsule                Primary Care Provider Office Phone # Fax #    Ranulfo Neff [phone] [address] [place] [address]        Equal Access to Services     [facility]: Hadii ruma ku hadasho Soomaali, waaxda luqadaha, qaybta kaalmada adeegyada, sancho escudero . So [facility] [phone].    ATENCIÓN: Si habla español, tiene a romero disposición servicios gratuitos de asistencia lingüística. Llame al [phone].    We comply with applicable federal civil rights laws and [place] laws. We do not discriminate on the basis of race, color, national origin, age, disability, sex,  sexual orientation, or gender identity.            Thank you!     Thank you for choosing [facility]  for your care. Our goal is always to provide you with excellent care. Hearing back from our patients is one way we can continue to improve our services. Please take a few minutes to complete the written survey that you may receive in the mail after your visit with us. Thank you!             Your Updated Medication List - Protect others around you: Learn how to safely use, store and throw away your medicines at www.disposemymeds.org.          This list is accurate as of 11/26/18 11:33 AM.  Always use your most recent med list.                   Brand Name Dispense Instructions for use Diagnosis    Multi-vitamin Tabs tablet      Take 1 tablet by mouth daily.        psyllium Packet    METAMUCIL/KONSYL     Take 1 packet by mouth daily        terazosin 1 MG capsule    HYTRIN    30 capsule    Take 1 capsule (1 mg) by mouth At Bedtime    Benign prostatic hyperplasia with lower urinary tract symptoms, symptom details unspecified       TYLENOL PO

## 2018-11-26 NOTE — PROGRESS NOTES
SUBJECTIVE:   CC: Manny Pitt is an 48 year old male who presents for preventative health visit.     Physical   Annual:     Getting at least 3 servings of Calcium per day:  Yes    Bi-annual eye exam:  Yes    Dental care twice a year:  Yes    Sleep apnea or symptoms of sleep apnea:  None    Diet:  Carbohydrate counting    Frequency of exercise:  2-3 days/week    Duration of exercise:  45-60 minutes    Taking medications regularly:  Yes    Medication side effects:  None    Additional concerns today:  YES (BPH-stopped flomax, had trouble taking the pill.  requests alternative.  nocturia persists.  recent visits with urology)      Today's PHQ-2 Score:   PHQ-2 ( 1999 [employer]) 11/26/2018   Q1: Little interest or pleasure in doing things 0   Q2: Feeling down, depressed or hopeless 0   PHQ-2 Score 0   Q1: Little interest or pleasure in doing things Not at all   Q2: Feeling down, depressed or hopeless Not at all   PHQ-2 Score 0       Abuse: Current or Past(Physical, Sexual or Emotional)- No  Do you feel safe in your environment - Yes    Social History   Substance Use Topics     Smoking status: Never Smoker     Smokeless tobacco: Never Used     Alcohol use Yes      Comment: 2-3 drinks per week     Alcohol Use 11/26/2018   If you drink alcohol do you typically have greater than 3 drinks per day OR greater than 7 drinks per week? No     Reviewed orders with patient. Reviewed health maintenance and updated orders accordingly - Yes      Reviewed and updated as needed this visit by clinical staff  Tobacco  Allergies  Meds         Reviewed and updated as needed this visit by Provider        Patient Active Problem List   Diagnosis     Hyperlipidemia LDL goal <160     Recurrent basal cell carcinoma of skin     Onychomycosis     Hemorrhoids, unspecified hemorrhoid type     Benign prostatic hyperplasia with lower urinary tract symptoms, symptom details unspecified     SCC (squamous cell carcinoma), face     Past Medical History:    Diagnosis Date     Esophageal reflux      Hernia, abdominal      Other malignant neoplasm of skin of lower limb, including hip     BASAL CELL CA S/P RESECTION 2003     Other malignant neoplasm of skin of trunk, except scrotum     BASAL CELL CA S/P RESECTION 2003     Personal history of other disorders of nervous system and sense organs      Unspecified eustachian tube disorder        Past Surgical History:   Procedure Laterality Date     C NONSPECIFIC PROCEDURE      Hernia     COLONOSCOPY  2007     COLONOSCOPY  12/26/2012    Procedure: COLONOSCOPY;  Colonoscopy ;  Surgeon: Ranulfo Ramirez MD;  Location:  GI     COLONOSCOPY N/A 12/20/2016    Procedure: COLONOSCOPY;  Surgeon: Paula Johnson MD;  Location: [facility] GI     HERNIA REPAIR         Family History   Problem Relation Age of Onset     Cancer Maternal Grandmother      basal cell     Cerebrovascular Disease Maternal Grandmother      Cancer Maternal Grandfather      basal cell     Cerebrovascular Disease Paternal Grandmother      Alcohol/Drug Paternal Grandfather      Hypertension Mother      HEART DISEASE Father      anuerysm     Hypertension Father      Cancer Father      lung     Diabetes No family hx of      Colon Cancer No family hx of        ALLERGIES     Allergies   Allergen Reactions     Erythromycin Anaphylaxis     Doxycycline      Severe headache         Review of Systems   Constitutional: Negative for chills and fever.   HENT: Negative for congestion, ear pain, hearing loss and sore throat.    Eyes: Positive for pain. Negative for visual disturbance.   Respiratory: Negative for cough and shortness of breath.    Cardiovascular: Negative for chest pain, palpitations and peripheral edema.   Gastrointestinal: Negative for abdominal pain, constipation, diarrhea, heartburn, hematochezia and nausea.   Genitourinary: Positive for frequency and urgency. Negative for dysuria, genital sores and hematuria.   Musculoskeletal: Negative for arthralgias, joint  "swelling and myalgias.   Skin: Negative for rash.   Neurological: Negative for dizziness, weakness, headaches and paresthesias.   Psychiatric/Behavioral: Negative for mood changes.         OBJECTIVE:   /64 (Cuff Size: Adult Regular)  Pulse 58  Temp 98.6  F (37  C) (Oral)  Ht 6' 2\" (1.88 m)  Wt 193 lb (87.5 kg)  SpO2 96%  BMI 24.78 kg/m2    Physical Exam  GENERAL: healthy, alert and no distress  EYES: Eyes grossly normal to inspection, PERRL and conjunctivae and sclerae normal  HENT: ear canals and TM's normal, nose and mouth without ulcers or lesions  NECK: no adenopathy, no asymmetry, masses, or scars and thyroid normal to palpation  RESP: lungs clear to auscultation - no rales, rhonchi or wheezes  CV: regular rate and rhythm, normal S1 S2, no S3 or S4, no murmur, click or rub, no peripheral edema and peripheral pulses strong  ABDOMEN: soft, nontender, no hepatosplenomegaly, no masses and bowel sounds normal  MS: no gross musculoskeletal defects noted, no edema  SKIN: facial scars over forehead, right side of upper lip  NEURO: Normal strength and tone, mentation intact and speech normal  PSYCH: mentation appears normal, affect normal/bright    ASSESSMENT/PLAN:       ICD-10-CM    1. Encounter for routine adult health examination with abnormal findings Z00.01        2. Benign prostatic hyperplasia with lower urinary tract symptoms, symptom details unspecified N40.1 terazosin (HYTRIN) 1 MG capsule      Stopped flomax.  Recommended eliminating fluids 2hrs prior to bedtime.  if no improvement in the next week, Start trial of terazosin-Medication side effects discussed.      3. Hyperlipidemia LDL goal <160 E78.5 Recent labwork showed improvement after  dietary changes     4. Recurrent basal cell carcinoma of skin C44.91 Recurrent basal cell and and more recent facial SCCa.  Following up with dermatology Q1-3 months   5. SCC (squamous cell carcinoma), face C44.320      6. Need for tetanus booster Z23 TDAP, IM " "(10 - 64 YRS) - Adacel       COUNSELING:   Reviewed preventive health counseling, as reflected in patient instructions       Regular exercise       Healthy diet/nutrition       Colon cancer screening       Prostate cancer screening    BP Readings from Last 1 Encounters:   11/26/18 108/64     Estimated body mass index is 24.78 kg/(m^2) as calculated from the following:    Height as of this encounter: 6' 2\" (1.88 m).    Weight as of this encounter: 193 lb (87.5 kg).           reports that he has never smoked. He has never used smokeless tobacco.      Counseling Resources:  ATP IV Guidelines  Pooled Cohorts Equation Calculator  FRAX Risk Assessment  ICSI Preventive Guidelines  Dietary Guidelines for Americans, 2010  USDA's MyPlate  ASA Prophylaxis  Lung CA Screening    Ranulfo Neff MD, MD  [facility]  "

## 2018-11-26 NOTE — PATIENT INSTRUCTIONS
INSTRUCTIONS FOR TODAY:     no fluids within 2 hours of bedtime   if no improvement, start terazosin 1mg each evening and follow-up in 2-3 weeks if not better     Dr Neff    Preventive Health Recommendations  Male Ages 50 - 64    Yearly exam:             See your health care provider every year in order to  o   Review health changes.   o   Discuss preventive care.    o   Review your medicines if your doctor has prescribed any.     Have a cholesterol test every 5 years, or more frequently if you are at risk for high cholesterol/heart disease.     Have a diabetes test (fasting glucose) every three years. If you are at risk for diabetes, you should have this test more often.     Have a colonoscopy at age 50, or have a yearly FIT test (stool test). These exams will check for colon cancer.      Talk with your health care provider about whether or not a prostate cancer screening test (PSA) is right for you.    You should be tested each year for STDs (sexually transmitted diseases), if you re at risk.     Shots: Get a flu shot each year. Get a tetanus shot every 10 years.     Nutrition:    Eat at least 5 servings of fruits and vegetables daily.     Eat whole-grain bread, whole-wheat pasta and brown rice instead of white grains and rice.     Get adequate Calcium and Vitamin D.     Lifestyle    Exercise for at least 150 minutes a week (30 minutes a day, 5 days a week). This will help you control your weight and prevent disease.     Limit alcohol to one drink per day.     No smoking.     Wear sunscreen to prevent skin cancer.     See your dentist every six months for an exam and cleaning.     See your eye doctor every 1 to 2 years.

## 2018-12-12 DIAGNOSIS — R35.1 BENIGN PROSTATIC HYPERPLASIA WITH NOCTURIA: ICD-10-CM

## 2018-12-12 DIAGNOSIS — N40.1 BENIGN PROSTATIC HYPERPLASIA WITH NOCTURIA: ICD-10-CM

## 2018-12-13 RX ORDER — TAMSULOSIN HYDROCHLORIDE 0.4 MG/1
0.4 CAPSULE ORAL DAILY
Qty: 90 CAPSULE | Refills: 0 | Status: SHIPPED | OUTPATIENT
Start: 2018-12-13 | End: 2019-04-03

## 2019-04-01 ENCOUNTER — APPOINTMENT (OUTPATIENT)
Dept: GENERAL RADIOLOGY | Facility: CLINIC | Age: 49
End: 2019-04-01
Attending: EMERGENCY MEDICINE
Payer: COMMERCIAL

## 2019-04-01 ENCOUNTER — TELEPHONE (OUTPATIENT)
Dept: PEDIATRICS | Facility: CLINIC | Age: 49
End: 2019-04-01

## 2019-04-01 ENCOUNTER — HOSPITAL ENCOUNTER (EMERGENCY)
Facility: CLINIC | Age: 49
Discharge: HOME | End: 2019-04-01
Attending: EMERGENCY MEDICINE | Admitting: EMERGENCY MEDICINE
Payer: COMMERCIAL

## 2019-04-01 VITALS
HEART RATE: 79 BPM | TEMPERATURE: 98.4 F | DIASTOLIC BLOOD PRESSURE: 104 MMHG | OXYGEN SATURATION: 93 % | RESPIRATION RATE: 21 BRPM | SYSTOLIC BLOOD PRESSURE: 173 MMHG

## 2019-04-01 DIAGNOSIS — R07.9 CHEST PAIN, UNSPECIFIED TYPE: ICD-10-CM

## 2019-04-01 LAB
ANION GAP SERPL CALCULATED.3IONS-SCNC: 6 MMOL/L (ref 3–14)
BUN SERPL-MCNC: 21 MG/DL (ref 7–30)
CALCIUM SERPL-MCNC: 8.8 MG/DL (ref 8.5–10.1)
CHLORIDE SERPL-SCNC: 106 MMOL/L (ref 94–109)
CO2 SERPL-SCNC: 27 MMOL/L (ref 20–32)
CREAT SERPL-MCNC: 0.96 MG/DL (ref 0.66–1.25)
ERYTHROCYTE [DISTWIDTH] IN BLOOD BY AUTOMATED COUNT: 12.4 % (ref 10–15)
GFR SERPL CREATININE-BSD FRML MDRD: >90 ML/MIN/{1.73_M2}
GLUCOSE SERPL-MCNC: 109 MG/DL (ref 70–99)
HCT VFR BLD AUTO: 50.3 % (ref 40–53)
HGB BLD-MCNC: 16.6 G/DL (ref 13.3–17.7)
MCH RBC QN AUTO: 29.5 PG (ref 26.5–33)
MCHC RBC AUTO-ENTMCNC: 33 G/DL (ref 31.5–36.5)
MCV RBC AUTO: 90 FL (ref 78–100)
PLATELET # BLD AUTO: 243 10E9/L (ref 150–450)
POTASSIUM SERPL-SCNC: 3.6 MMOL/L (ref 3.4–5.3)
RBC # BLD AUTO: 5.62 10E12/L (ref 4.4–5.9)
SODIUM SERPL-SCNC: 139 MMOL/L (ref 133–144)
TROPONIN I SERPL-MCNC: <0.015 UG/L (ref 0–0.04)
WBC # BLD AUTO: 6.1 10E9/L (ref 4–11)

## 2019-04-01 PROCEDURE — 93005 ELECTROCARDIOGRAM TRACING: CPT

## 2019-04-01 PROCEDURE — 99285 EMERGENCY DEPT VISIT HI MDM: CPT

## 2019-04-01 PROCEDURE — 80048 BASIC METABOLIC PNL TOTAL CA: CPT | Performed by: EMERGENCY MEDICINE

## 2019-04-01 PROCEDURE — 71046 X-RAY EXAM CHEST 2 VIEWS: CPT

## 2019-04-01 PROCEDURE — 84484 ASSAY OF TROPONIN QUANT: CPT | Performed by: EMERGENCY MEDICINE

## 2019-04-01 PROCEDURE — 85027 COMPLETE CBC AUTOMATED: CPT | Performed by: EMERGENCY MEDICINE

## 2019-04-01 ASSESSMENT — ENCOUNTER SYMPTOMS
MYALGIAS: 1
NAUSEA: 0
SHORTNESS OF BREATH: 0
FEVER: 0
COUGH: 0
VOMITING: 0

## 2019-04-01 NOTE — TELEPHONE ENCOUNTER
"Patient noted late Saturday afternoon and Sunday some chest \"twinges\"  Did a one hour Spin class on Saturday morning with no discomfort.  No episodes of chest discomfort today.  Notes \"spasm\" in his chest located in different areas of his chest, sometimes in the right chest, left chest or center of chest.  These last seconds.  Sometimes has multiple occurrences in one hour.  No shortness of breath with these \"twinges\".  No diaphoresis.  No jaw or arm pain.  Had two biopsies on posterior shoulder last week, and notes tightness at biopsy site.    Had similar symptoms two weeks ago, and then they resolved and didn't return until Saturday.  Advised an appointment for evaluation.  Advised it patient notes chest tightness or heaviness, or if the \"chest twinges\" become prolonged, shortness of breath, irregular heartbeat, he should go to the ER immediately.  NEIDA Taylor RN  "

## 2019-04-01 NOTE — TELEPHONE ENCOUNTER
Discussed with Dr. Neff.   Ok to wait for appointment on Wed.  Appointment scheduled.  Patient notified.  No further questions.  Will call back if any other questions or concerns.  NEIDA Taylor RN

## 2019-04-01 NOTE — ED PROVIDER NOTES
"  History     Chief Complaint:  Chest Pain     HPI   Manny Pitt is a 49 year old male with a history of hyperlipidemia who presents for evaluation of chest pain. On 3/30/2019, the patient started to develop intermittent \"twinges\" of chest pain that only occur for a second at a time before resolving completely. On 3/31, the patient was running on a treadmill when he had more twinges of chest pain, so he stopped running. Throughout the day, the patient continued to have these twinges, even when at rest. This morning, the patient had a few more momentary episodes of chest pain, and he took 650 mg of Aspirin. Today around 1400, the patient started to develop very minor jaw pain and pain in his right arm. Due to concern for his twinges, the patient called his primary care clinic, and they advised him to seek evaluation in the ED if he is having jaw and arm pain. The patient then came into the ED for evaluation. Currently in the ED, the patient rates his jaw and arm pain at a severity of 1/10, and he denies any ongoing chest pain. He has not had any recent fever, cough, shortness of breath, nausea, or vomiting.     Cardiac Risk Factors:  CAD:    Negative   Hypertension:   Negative   Hyperlipidemia:  Positive   Diabetes:   Negative   Tobacco use:   Negative   Gender:   Male   Age:    49   Familial Hx of CAD:  Positive      Allergies:  Erythromycin  Doxycycline      Medications:    Acetaminophen (TYLENOL PO)  multivitamin, therapeutic with minerals (MULTI-VITAMIN) TABS  psyllium (METAMUCIL/KONSYL) Packet  tamsulosin (FLOMAX) 0.4 MG capsule  terazosin (HYTRIN) 1 MG capsule    Past Medical History:    Esophageal reflux  Hyperlipemia  Recurrent basal cell carcinoma of skin   Abdominal hernia  Other malignant neoplasm of skin of trunk, except scrotum   Other malignant neoplasm of skin of lower limb, including hip  Eustachian tube disorder  Other disorders of nervous system and sense organs   Hemorrhoids  Benign prostatic " hyperplasia with lower urinary tract symptoms     Past Surgical History:    Hernia repair  Colonoscopy     Family History:    Hypertension - Mother and father   Heart disease - Father   Cancer, lung - Father     Social History:  Tobacco use:    Never smoker   Alcohol use:    Positive   Marital status:       Accompanied to ED by:  Wife      Review of Systems   Constitutional: Negative for fever.   Respiratory: Negative for cough and shortness of breath.    Cardiovascular: Positive for chest pain.   Gastrointestinal: Negative for nausea and vomiting.   Musculoskeletal: Positive for myalgias (right shoulder and jaw).   All other systems reviewed and are negative.      Physical Exam   First Vitals:  BP: (!) 173/104  Pulse: 79  Heart Rate: 68  Temp: 98.4  F (36.9  C)  Resp: 16  SpO2: 97 %      Physical Exam    General:   Pleasant, age appropriate.  HEENT:    Oropharynx is moist, without lesions or trismus.  Eyes:    Conjunctiva normal  Neck:    Supple, no meningismus.     CV:     Regular rate and rhythm.      No murmurs, rubs or gallops.       No JVD or unilateral leg swelling.       2+ radial pulses bilateral.       No lower extremity edema.  PULM:    Clear to auscultation bilateral.       No respiratory distress.      Good air exchange.     No rales or wheezing.     No stridor.  ABD:    Soft, non-tender, non-distended.       No pulsatile masses.       No rebound, guarding or rigidity.  MSK:     No gross deformity to all four extremities.   LYMPH:   No cervical lymphadenopathy.  NEURO:   Alert. Good muscle tone, no atrophy.  Skin:    Warm, dry and intact.    Psych:    Mood is good and affect is appropriate.        Emergency Department Course   ECG (18:02:09):  Indication: Screening for cardiovascular disease.   Rate 68 bpm. WV interval 160 ms. QRS duration 112 ms. QT/QTc 416/442 ms. P-R-T axes 4 -24 -3.   Interpretation: Normal sinus rhythm, Normal ECG   Agree with computer interpretation. Yes   No significant  change compared to EKG dated 2008.   Interpreted at 1819 by Dr. Reeves.      Imaging:  Radiographic findings were communicated with the patient who voiced understanding of the findings.    Chest XR:  IMPRESSION: No acute abnormality. Lungs are well-inflated and clear. Heart size is normal.   Per radiology.     Laboratory:  CBC: WNL (WBC 6.1, HGB 16.6, )  BMP: Glucose 109 high, o/w WNL (Creatinine 0.96)  Troponin I 1810: <0.015      Emergency Department Course:  Nursing notes and vitals reviewed.  : I performed an exam of the patient as documented above.     : I updated and reassessed the patient.     I personally reviewed the laboratory results with the patient and spouse and answered all related questions prior to discharge.     Findings and plan explained to the Patient and spouse. Patient discharged home with instructions regarding supportive care, medications, and reasons to return. The importance of close follow-up was reviewed.      Impression & Plan      Medical Decision Makin-year-old male presented to the ED with atypical chest pain.  EKG reveals no ischemic changes.  Troponin is within normal limits despite greater than 6 hours symptoms ruling out myocardial infarction.  He has no features concerning for pulmonary embolus, aortic dissection or aortic aneurysm.  Upon reexamination the patient mentioned that he had a brief episode of discomfort which was associated with a PVC on the monitor which is the likely source of his symptoms.  Patient safe for discharge home with close follow-up primary care physician and return to ED for any worsening symptoms.    Diagnosis:    ICD-10-CM   1. Chest pain, unspecified type R07.9     Disposition:  Discharged to home.       IJulio Cesar, am serving as a scribe at 6:08 PM on 2019 to document services personally performed by Dr. Reeves, based on my observations and the provider's statements to me.    [facility] EMERGENCY  DEPARTMENT       Kenn Reeves MD  04/03/19 0708

## 2019-04-01 NOTE — ED TRIAGE NOTES
Patient presents to the ED reporting some chest pain after working out at the gym on Saturday. States that since then has had a few brief episodes of chest pain. Today noticed some right jaw and arm pain.

## 2019-04-01 NOTE — ED AVS SNAPSHOT
[facility] Emergency Department  [address]  [facility] [address]  Phone:  [phone]  Fax:  [phone]                                    Manny Pitt   MRN: [id number]    Department:  [facility] Emergency Department   Date of Visit:  4/1/2019           After Visit Summary Signature Page    I have received my discharge instructions, and my questions have been answered. I have discussed any challenges I see with this plan with the nurse or doctor.    ..........................................................................................................................................  Patient/Patient Representative Signature      ..........................................................................................................................................  Patient Representative Print Name and Relationship to Patient    ..................................................               ................................................  Date                                   Time    ..........................................................................................................................................  Reviewed by Signature/Title    ...................................................              ..............................................  Date                                               Time          22EPIC Rev 08/18

## 2019-04-02 LAB — INTERPRETATION ECG - MUSE: NORMAL

## 2019-04-03 ENCOUNTER — OFFICE VISIT (OUTPATIENT)
Dept: PEDIATRICS | Facility: CLINIC | Age: 49
End: 2019-04-03
Payer: COMMERCIAL

## 2019-04-03 VITALS
TEMPERATURE: 98.8 F | WEIGHT: 201 LBS | OXYGEN SATURATION: 97 % | DIASTOLIC BLOOD PRESSURE: 80 MMHG | BODY MASS INDEX: 25.8 KG/M2 | HEIGHT: 74 IN | SYSTOLIC BLOOD PRESSURE: 118 MMHG | HEART RATE: 67 BPM

## 2019-04-03 DIAGNOSIS — R20.2 PARESTHESIAS: ICD-10-CM

## 2019-04-03 DIAGNOSIS — R07.9 CHEST PAIN, UNSPECIFIED TYPE: Primary | ICD-10-CM

## 2019-04-03 PROCEDURE — 99215 OFFICE O/P EST HI 40 MIN: CPT | Performed by: INTERNAL MEDICINE

## 2019-04-03 ASSESSMENT — MIFFLIN-ST. JEOR: SCORE: 1836.48

## 2019-04-03 NOTE — PROGRESS NOTES
SUBJECTIVE:   Manny Pitt is a 49 year old male who presents to clinic today for the following health issues:    ED/UC Followup:    Facility:  [facility]  Date of visit: 4/1/19  Reason for visit: chest pains  Current Status: some pains, left side     CHEST PAIN   Onset: past week  Description:   Location:  central  Character: sharp  Radiation: to left side  Duration: short episodes-a second or 2   Intensity: moderate  Progression of Symptoms:  intermittent  Accompanying Signs & Symptoms:  Shortness of breath: no  Sweating: no  Nausea/vomiting: no  Lightheadedness: no  Palpitations: no  Fever/Chills: no  Cough: no  Heartburn: ?  History:   Family history of heart disease no  Tobacco use: no  Precipitating factors:   Worse with exertion: no  Worse with deep breaths :  no  Related to food: no  Alleviating factors:       Therapies Tried and outcome: recent ER visit    Nic returns for follow-up of recent emergency room visit with complaints of central chest pain.  Patient describes chest pain as noted above which has been on and off for the past week.  Specifically notes symptoms when he runs on the treadmill.  The episodes are brief and intermittent-stop running on the treadmill twice secondary to recurrent symptoms and concerns regarding heart problem.  Patient has had similar chest pains and prior evaluation a few years ago.  He recently started running after taking several months off.  He denies any recent muscular strain or chest wall injury.  Of note, also history of gastroesophageal reflux but has been off both PPI and H2 blocker for several months.  Habits: Drinks 3 caffeinated sodas daily.  Non-smoker.        Recent ER evaluation reviewed: Negative troponin, normal CBC, BMP, EKG.  Patient was discharged, plan for outpatient follow-up    Also concerns today regarding numbness paresthesias of his left foot which occur when running on the treadmill.  At times notes a mild ache in the posterior aspect of his  knee on the right side.  Denies low back pain or pain in the buttocks.  Denies weakness.  Symptoms are triggered by running on the treadmill       Patient Active Problem List   Diagnosis     Hyperlipidemia LDL goal <160     Recurrent basal cell carcinoma of skin     Onychomycosis     Hemorrhoids, unspecified hemorrhoid type     Benign prostatic hyperplasia with lower urinary tract symptoms, symptom details unspecified     SCC (squamous cell carcinoma), face     Past Surgical History:   Procedure Laterality Date     C NONSPECIFIC PROCEDURE      Hernia     COLONOSCOPY  2007     COLONOSCOPY  12/26/2012    Procedure: COLONOSCOPY;  Colonoscopy ;  Surgeon: Ranulfo Ramirez MD;  Location: [facility]     COLONOSCOPY N/A 12/20/2016    Procedure: COLONOSCOPY;  Surgeon: Paula Johnson MD;  Location: [facility]     HERNIA REPAIR         Social History     Tobacco Use     Smoking status: Never Smoker     Smokeless tobacco: Never Used   Substance Use Topics     Alcohol use: Yes     Comment: 2-3 drinks per week     Family History   Problem Relation Age of Onset     Cancer Maternal Grandmother         basal cell     Cerebrovascular Disease Maternal Grandmother      Cancer Maternal Grandfather         basal cell     Cerebrovascular Disease Paternal Grandmother      Alcohol/Drug Paternal Grandfather      Hypertension Mother      Heart Disease Father         anuerysm     Hypertension Father      Cancer Father         lung     Diabetes No family hx of      Colon Cancer No family hx of          Current Outpatient Medications   Medication Sig Dispense Refill     Acetaminophen (TYLENOL PO)        multivitamin, therapeutic with minerals (MULTI-VITAMIN) TABS Take 1 tablet by mouth daily.       omeprazole (PRILOSEC) 20 MG DR capsule Take 2 capsules daily for 1 week then 1 capsule daily 21 capsule 0     psyllium (METAMUCIL/KONSYL) Packet Take 1 packet by mouth daily       terazosin (HYTRIN) 1 MG capsule Take 1 capsule (1 mg) by mouth At Bedtime  "30 capsule 1     ROS: The following systems have been completely reviewed and are negative except as noted in the HPI: CONSTITUTIONAL, HEAD AND NECK, CARDIOVASCULAR, PULMONARY, GASTROINTESTINAL, GENITOURINARY, DERMATOLOGIC, NEUROLOGIC, ENDOCRINE, PSYCHIATRIC.     OBJECTIVE:                                                    /80 (Cuff Size: Adult Regular)   Pulse 67   Temp 98.8  F (37.1  C) (Oral)   Ht 1.88 m (6' 2\")   Wt 91.2 kg (201 lb)   SpO2 97%   BMI 25.81 kg/m   Body mass index is 25.81 kg/m .  GENERAL:  alert,  no distress  HENT: oropharynx-normal  NECK: no tenderness, no adenopathy  RESP: lungs clear to auscultation - no rales, no rhonchi, no wheezes  CV: regular rates and rhythm, normal S1 S2, no S3 or S4   ABDOMEN: soft, no tenderness, no  hepatosplenomegaly, no masses, normal bowel sounds  MS: extremities- no edema  Back: Nontender  NEURO: CN 2-12 intact, brisk, symmetric reflexes at knees and biceps b/l, strength 5/5 throughout and symmetric, sensation to light touch grossly intact throughout.      ASSESSMENT/PLAN:                                                        ICD-10-CM    1. Chest pain, unspecified type R07.9 Echocardiogram Exercise Stress     omeprazole (PRILOSEC) 20 MG DR capsule      Atypical chest pain, history as noted above.  Differential: Consider gastroesophageal reflux, musculoskeletal pain, angina, anxiety.  Based on recent ER workup and atypical nature of the pain, cardiac etiology seems unlikely.  Pulmonary  etiology unlikely with absence of shortness of breath cough or other respiratory symptoms.  GERD seems more likely with patient's significant caffeine intake and prior history.  Also consider musculoskeletal chest wall pain.         Patient expresses significant ongoing concerns regarding cardiac pain and is requesting additional testing today/plan for stress echocardiogram.  Recommended 2-week trial of omeprazole for possible GERD.     2. Paresthesias R20.2  left " foot paresthesias associated with running.  Suspect sciatica.  Recommended trial of physical therapy and core strengthening-patient declines and plans to start home stretching regimen before exercise.        Ranulfo Neff MD  [facility]

## 2019-04-03 NOTE — PATIENT INSTRUCTIONS
[facility] will call regarding stress test  Start omeprazole and start to wean off caffeine (drink more water)  Suspect foot symptoms are due to sciatica.         Patient Education     Understanding Lumbar Radiculopathy    Lumbar radiculopathy is irritation or inflammation of a nerve root in the low back. It causes symptoms that spread out from the back down one or both legs. To understand this condition, it helps to understand the parts of the spine:    Vertebrae. These are bones that stack to form the spine. The lumbar spine contains the 5 bottom vertebrae.    Disks. These are soft pads of tissue between the vertebrae. They act as shock absorbers for the spine.    Spinal canal. This is a tunnel formed within the stacked vertebrae. In the lumbar spine, nerves run through this canal.    Nerves. These branch off and leave the spinal canal, traveling out to parts of the body. As they leave the spinal canal, nerves pass through openings between the vertebrae. The nerve root is the part of the nerve that is closest to the spinal canal.    Sciatic nerve. This is a large nerve formed from several nerve roots in the low back. This nerve extends down the back of the leg to the foot.  With lumbar radiculopathy, nerve roots in the low back become irritated. This leads to pain and symptoms. The sciatic nerve is commonly involved, so the condition is often called sciatica.  What causes lumbar radiculopathy?  Aging, injury, poor posture, extra body weight, and other issues can lead to problems in the low back. These problems may then irritate nerve roots. They include:    Damage to a disk in the lumbar spine. The damaged disk may then press on nearby nerve roots.    Degeneration from wear and tear, and aging. This can lead to narrowing (stenosis) of the openings between the vertebrae. The narrowed openings press on nerve roots as they leave the spinal canal.    Unstable spine. This is when a vertebra slips forward. It can then press  on a nerve root.  Other, less common things can put pressure on nerves in the low back. These include diabetes, infection, or a tumor.  Symptoms of lumbar radiculopathy  These include:    Pain in the low back    Pain, numbness, tingling, or weakness that travels into the buttocks, hip, groin, or leg    Muscle spasms  Treatment for lumbar radiculopathy  In most cases, your healthcare provider will first try treatments that help relieve symptoms. These may include:    Prescription and over-the-counter pain medicines. These help relieve pain, swelling, and irritation.    Limits on positions and activities that increase pain. But lying in bed or avoiding all movement is only recommended for a short period of time.    Physical therapy, including exercises and stretches. This helps decrease pain and increase movement and function.    Steroid shots into the lower back. This may help relieve symptoms for a time.    Weight-loss program. If you are overweight, losing extra pounds may help relieve symptoms.  In some cases, you may need surgery to fix the underlying problem. This depends on the cause, the symptoms, and how long the pain has lasted.  Possible complications  Over time, an irritated and inflamed nerve may become damaged. This may lead to long-lasting (permanent) numbness or weakness in your legs and feet. If symptoms change suddenly or get worse, be sure to let your healthcare provider know.  When to call your healthcare provider  Call your healthcare provider right away if you have any of these:    New pain or pain that gets worse    New or increasing weakness, tingling, or numbness in your leg or foot    Problems controlling your bladder or bowel   Date Last Reviewed: 3/10/2016    8669-3204 [employer]. [address] [place] [address], [place] [address]. All rights reserved. This information is not intended as a substitute for professional medical care. Always follow your healthcare professional's  "instructions.           Patient Education       * Sciatica    Sciatica (\"Lumbar Radiculopathy\") causes a pain that spreads from the lower back down into the buttock, hip and leg. Sometimes leg pain can occur without any back pain. Sciatica is due to irritation or pressure on a spinal nerve as it comes out of the spinal canal. This is most often due to pressure on the nerve from a nearby spinal disk (the cartilage cushion between each spinal bone). Other causes include spinal stenosis (narrowing of the spinal canal) and spasm of the piriformis muscle (a muscle in the buttocks that the sciatic nerve passes through).  Sciatica may begin after a sudden twisting/bending force (such as in a car accident), or sometimes after a simple awkward movement. In either case, muscle spasm is commonly present and can add to the pain.  The diagnosis of sciatica is made from the symptoms and physical exam. Unless you had a physical injury (such as a car accident or fall), X-rays are usually not ordered for the initial evaluation of sciatica because the nerves and disks cannot be seen on an X-ray. Most sciatica (80-90%) gets better with time.  What can I do about my low back pain?  There are three main things you can do to ease low back pain and help it go away.    Stay active! Use positions, movements and exercises. Too much rest can make your symptoms worse.    Use heat or cold packs.    Take medicine as directed.  Using positions, movements and exercises  Research tells us that moving your joints and muscles can help you recover from back pain. Such activity should be simple and gentle.  Use walking to help relieve your discomfort. Try taking a short walk every 3 to 4 hours during the day. Walk for a few minutes inside your home or take longer walks outside, on a treadmill or at a mall. Slowly increase the amount of time you walk. Expect discomfort when you begin, but it should lessen as your back starts to recover.  Finding a " position that is comfortable  When your back pain is new, you may find that certain positions will ease your pain. Gently try each of the following positions until you find one that eases your pain. Once you find a position of comfort, use it as often as you like while you recover. Return to your daily routine as soon as possible.     Lie on your back with your legs bent. You can do this by placing a pillow under your knees or lie on the floor and rest your lower legs on the seat of a chair.    Lie on your side with your knees bent and place a pillow between your knees.    Lie on your stomach over pillows.  Using heat or cold packs  Try cold packs or gentle heat to ease your pain. Use whichever gives you the most relief. Apply the cold pack or heat for 15 minutes at a time, as often as needed.  Taking medicine  If taking over-the-counter medicine:    Take ibuprofen (Advil, Motrin) 600 mg. three times a day as needed for pain.  OR    Take Aleve (naproxen sodium) 220 to 440 mg. two times a day as needed for pain.  If your doctor prescribed medicine, follow the instructions. Stop taking the medicine as soon as you can.  When should I call my doctor?  Your back pain should improve over the first couple of weeks. As it improves, you should be able to return to your normal activities. But call your doctor if:    You have a sudden change in your ability to control? your bladder or bowels.    You begin to feel tingling in your groin or legs.    The pain spreads down your leg and into your foot.    Your toes, feet or leg muscles begin to feel weak.    You feel generally unwell or sick.    Your pain gets worse.    3553-9440 [employer]. [address] [place] [address]. All rights reserved. This information is not intended as a substitute for professional medical care. Always follow your healthcare professional's instructions.  This information has been modified by your health care provider with  permission from the publisher.

## 2019-04-05 ENCOUNTER — HOSPITAL ENCOUNTER (OUTPATIENT)
Dept: CARDIOLOGY | Facility: CLINIC | Age: 49
Discharge: HOME | End: 2019-04-05
Attending: INTERNAL MEDICINE | Admitting: INTERNAL MEDICINE
Payer: COMMERCIAL

## 2019-04-05 DIAGNOSIS — R07.9 CHEST PAIN, UNSPECIFIED TYPE: ICD-10-CM

## 2019-04-05 PROCEDURE — 40000264 ECHO STRESS ECHOCARDIOGRAM

## 2019-04-05 PROCEDURE — 93350 STRESS TTE ONLY: CPT | Mod: 26 | Performed by: INTERNAL MEDICINE

## 2019-04-05 PROCEDURE — 93325 DOPPLER ECHO COLOR FLOW MAPG: CPT | Mod: 26 | Performed by: INTERNAL MEDICINE

## 2019-04-05 PROCEDURE — 93018 CV STRESS TEST I&R ONLY: CPT | Performed by: INTERNAL MEDICINE

## 2019-04-05 PROCEDURE — 25500064 ZZH RX 255 OP 636: Performed by: INTERNAL MEDICINE

## 2019-04-05 PROCEDURE — 93321 DOPPLER ECHO F-UP/LMTD STD: CPT | Mod: 26 | Performed by: INTERNAL MEDICINE

## 2019-04-05 PROCEDURE — 93016 CV STRESS TEST SUPVJ ONLY: CPT | Performed by: INTERNAL MEDICINE

## 2019-04-05 RX ADMIN — HUMAN ALBUMIN MICROSPHERES AND PERFLUTREN 3 ML: 10; .22 INJECTION, SOLUTION INTRAVENOUS at 14:21

## 2019-04-13 ENCOUNTER — MYC MEDICAL ADVICE (OUTPATIENT)
Dept: PEDIATRICS | Facility: CLINIC | Age: 49
End: 2019-04-13

## 2019-04-13 DIAGNOSIS — R07.9 CHEST PAIN, UNSPECIFIED TYPE: ICD-10-CM

## 2019-04-13 NOTE — TELEPHONE ENCOUNTER
"Requested Prescriptions   Pending Prescriptions Disp Refills     omeprazole (PRILOSEC) 20 MG DR capsule [Pharmacy Med Name:  Last Written Prescription Date:  4/3/19  Last Fill Quantity: 21,  # refills: 0   Last office visit: 4/3/2019 with prescribing provider:  4/3/19   Future Office Visit:     OMEPRAZOLE DR 20 MG CAPSULE] 21 capsule 0     Sig: TAKE 2 CAPSULES DAILY FOR 1 WEEK THEN 1 CAPSULE DAILY       PPI Protocol Passed - 4/13/2019  7:26 AM        Passed - Not on Clopidogrel (unless Pantoprazole ordered)        Passed - No diagnosis of osteoporosis on record        Passed - Recent (12 mo) or future (30 days) visit within the authorizing provider's specialty     Patient had office visit in the last 12 months or has a visit in the next 30 days with authorizing provider or within the authorizing provider's specialty.  See \"Patient Info\" tab in inbasket, or \"Choose Columns\" in Meds & Orders section of the refill encounter.              Passed - Medication is active on med list        Passed - Patient is age 18 or older          "

## 2019-04-16 NOTE — TELEPHONE ENCOUNTER
Patient was seen on 04/03/19 for chest discomfort.  Was told to follow-up in two weeks.  Has scheduled an appointment for 04/23.  Should he continue the Omeprazole until the office appointment, or see how he does when he is off of it for one week?  NEIDA Taylor RN

## 2019-04-16 NOTE — TELEPHONE ENCOUNTER
Discussed with Dr. Neff and he would like patient to continue to take the Simvastatin 20 mg daily.  Order qty of 90 with 0 refills.  Order placed per direction of Dr. Neff (please see refill encounter of 04/13/19).  Patient notified.  NEIDA Taylor RN

## 2019-04-16 NOTE — TELEPHONE ENCOUNTER
Please see My Chart encounter of 04/13/19.  Discussed with Dr. Neff and he would like patient to continue to take the Simvastatin 20 mg daily.  Order qty of 90 with 0 refills.  Order placed per direction of Dr. Neff, and patient notified.  NEIDA Taylor RN

## 2019-04-23 ENCOUNTER — OFFICE VISIT (OUTPATIENT)
Dept: PEDIATRICS | Facility: CLINIC | Age: 49
End: 2019-04-23
Payer: COMMERCIAL

## 2019-04-23 VITALS
WEIGHT: 204 LBS | SYSTOLIC BLOOD PRESSURE: 132 MMHG | TEMPERATURE: 98.6 F | BODY MASS INDEX: 26.19 KG/M2 | HEART RATE: 65 BPM | OXYGEN SATURATION: 98 % | DIASTOLIC BLOOD PRESSURE: 86 MMHG

## 2019-04-23 DIAGNOSIS — I10 ESSENTIAL HYPERTENSION: ICD-10-CM

## 2019-04-23 DIAGNOSIS — R07.89 ATYPICAL CHEST PAIN: Primary | ICD-10-CM

## 2019-04-23 DIAGNOSIS — K21.9 GASTROESOPHAGEAL REFLUX DISEASE WITHOUT ESOPHAGITIS: ICD-10-CM

## 2019-04-23 PROCEDURE — 99214 OFFICE O/P EST MOD 30 MIN: CPT | Performed by: INTERNAL MEDICINE

## 2019-04-23 NOTE — PROGRESS NOTES
SUBJECTIVE:                                                    Manny Pitt is a 49 year old male who presents to clinic today for the following health issues:    Presents today for follow-up of recent visit with complaints of chest pain.  Stress echocardiogram result reviewed today demonstrated normal ejection fraction and no evidence of ischemia.  Achieved adequate RPP.  Patient was instructed to start omeprazole for likely acid reflux.  States that chest pain  has largely resolved with some mild residual left-sided chest discomfort.  Has been reducing caffeine intake and adjusting diet to eliminate trigger foods.    Concerns regarding elevated blood pressure.  Strong family history of hypertension.  Body mass index is 26.19 kg/m .       Patient Active Problem List   Diagnosis     Hyperlipidemia LDL goal <160     Recurrent basal cell carcinoma of skin     Onychomycosis     Hemorrhoids, unspecified hemorrhoid type     Benign prostatic hyperplasia with lower urinary tract symptoms, symptom details unspecified     SCC (squamous cell carcinoma), face     Past Surgical History:   Procedure Laterality Date     C NONSPECIFIC PROCEDURE      Hernia     COLONOSCOPY  2007     COLONOSCOPY  12/26/2012    Procedure: COLONOSCOPY;  Colonoscopy ;  Surgeon: Ranulfo Ramirez MD;  Location: [facility]     COLONOSCOPY N/A 12/20/2016    Procedure: COLONOSCOPY;  Surgeon: Paula Johnson MD;  Location: [facility]     HERNIA REPAIR         Social History     Tobacco Use     Smoking status: Never Smoker     Smokeless tobacco: Never Used   Substance Use Topics     Alcohol use: Yes     Comment: 2-3 drinks per week     Family History   Problem Relation Age of Onset     Cancer Maternal Grandmother         basal cell     Cerebrovascular Disease Maternal Grandmother      Cancer Maternal Grandfather         basal cell     Cerebrovascular Disease Paternal Grandmother      Alcohol/Drug Paternal Grandfather      Hypertension Mother      Heart  Disease Father         anuerysm     Hypertension Father      Cancer Father         lung     Diabetes No family hx of      Colon Cancer No family hx of          Current Outpatient Medications   Medication Sig Dispense Refill     multivitamin, therapeutic with minerals (MULTI-VITAMIN) TABS Take 1 tablet by mouth daily.       omeprazole (PRILOSEC) 20 MG DR capsule Take 1 capsule (20 mg) by mouth daily 90 capsule 0     Acetaminophen (TYLENOL PO)        psyllium (METAMUCIL/KONSYL) Packet Take 1 packet by mouth daily         ROS: The following systems have been completely reviewed and are negative except as noted in the HPI: CONSTITUTIONAL, CARDIOVASCULAR, PULMONARY    OBJECTIVE:                                                    /86 (Cuff Size: Adult Regular)   Pulse 65   Temp 98.6  F (37  C) (Oral)   Wt 92.5 kg (204 lb)   SpO2 98%   BMI 26.19 kg/m   Body mass index is 26.19 kg/m .  GENERAL:  alert,  no distress  RESP: lungs clear to auscultation - no rales, no rhonchi, no wheezes  CV: regular rates and rhythm, normal S1 S2, no S3 or S4 and no murmur, no click or rub   MS: extremities- no edema       ASSESSMENT/PLAN:                                                        ICD-10-CM    1. Atypical chest pain R07.89  atypical chest pain, suspect symptoms secondary to GERD.  Continue omeprazole-reflux precautions discussed.  Reassurance regarding recent stress test, may resume exercise regimen without restrictions     2. Gastroesophageal reflux disease without esophagitis K21.9  continue omeprazole for 2 weeks then taper off.  If symptoms recur likely resume omeprazole or start ranitidine     3. Essential hypertension I10  discussed 6-month trial of lifestyle measures including weight loss and sodium restriction.  Discussed blood pressure medication if persistently elevated after 6 months        Ranulfo Neff MD  [facility]

## 2019-04-23 NOTE — PATIENT INSTRUCTIONS
Continue omeprazole for 2 weeks more then every other day for 1 week then stop.   If pain recurs notify us and will likely resume omeprazole or start zantac.     Goal weight mid 190's.  If BP still 130's/80's in 6 months we can discuss next steps

## 2019-07-07 DIAGNOSIS — R07.9 CHEST PAIN, UNSPECIFIED TYPE: ICD-10-CM

## 2019-07-07 NOTE — TELEPHONE ENCOUNTER
"Prescription [place] per [facility] [place].        Requested Prescriptions   Pending Prescriptions Disp Refills     omeprazole (PRILOSEC) 20 MG DR capsule [Pharmacy Med Name: OMEPRAZOLE DR 20 MG CAPSULE] 90 capsule 0     Sig: TAKE 1 CAPSULE BY MOUTH EVERY DAY       PPI Protocol Passed - 7/7/2019  7:35 AM        Passed - Not on Clopidogrel (unless Pantoprazole ordered)        Passed - No diagnosis of osteoporosis on record        Passed - Recent (12 mo) or future (30 days) visit within the authorizing provider's specialty     Patient had office visit in the last 12 months or has a visit in the next 30 days with authorizing provider or within the authorizing provider's specialty.  See \"Patient Info\" tab in inbasket, or \"Choose Columns\" in Meds & Orders section of the refill encounter.              Passed - Medication is active on med list        Passed - Patient is age 18 or older          "

## 2019-11-27 NOTE — PROGRESS NOTES
SUBJECTIVE:   CC: Manny Pitt is an 49 year old male who presents for preventative health visit.     Healthy Habits:     Getting at least 3 servings of Calcium per day:  Yes    Bi-annual eye exam:  Yes    Dental care twice a year:  Yes    Sleep apnea or symptoms of sleep apnea:  None    Diet:  Regular (no restrictions)    Frequency of exercise:  2-3 days/week    Duration of exercise:  15-30 minutes    Taking medications regularly:  Yes    Barriers to taking medications:  Not applicable    Medication side effects:  Not applicable    PHQ-2 Total Score: 0    Additional concerns today:  Yes    The 10-year ASCVD risk score (Saadjosephine WEST Jr., et al., 2013) is: 6%    Values used to calculate the score:      Age: 49 years      Sex: Male      Is Non- : No      Diabetic: No      Tobacco smoker: No      Systolic Blood Pressure: 132 mmHg      Is BP treated: No      HDL Cholesterol: 47 mg/dL      Total Cholesterol: 257 mg/dL    He had fasting labs which we reviewed  Cholesterol increasing  Tried Lipitor and Crestor in the past and developed side effects  He has gained weight  Used to run marathons and then 1/2 marathons more recently but did not run any races this past year  He has not been exercising regularly and has been snacking a lot at work    Follows with derm - [facility]  Hx SCC (lip) and BCC (face, back)  Following 2-3x/year      Today's PHQ-2 Score:   PHQ-2 ( 1999 [employer]) 12/5/2019   Q1: Little interest or pleasure in doing things 0   Q2: Feeling down, depressed or hopeless 0   PHQ-2 Score 0   Q1: Little interest or pleasure in doing things Not at all   Q2: Feeling down, depressed or hopeless Not at all   PHQ-2 Score 0       Abuse: Current or Past(Physical, Sexual or Emotional)- No  Do you feel safe in your environment? Yes        Social History     Tobacco Use     Smoking status: Never Smoker     Smokeless tobacco: Never Used   Substance Use Topics     Alcohol use: Yes     Frequency: 2-4  times a month     Drinks per session: 1 or 2     Binge frequency: Never     Comment: 2-3 drinks per week     If you drink alcohol do you typically have >3 drinks per day or >7 drinks per week? No    Alcohol Use 12/5/2019   Prescreen: >3 drinks/day or >7 drinks/week? No   Prescreen: >3 drinks/day or >7 drinks/week? -       Last PSA: No results found for: PSA    Reviewed orders with patient. Reviewed health maintenance and updated orders accordingly - Yes  Lab work is in process  Labs reviewed in EPIC  BP Readings from Last 3 Encounters:   12/05/19 132/82   04/23/19 132/86   04/03/19 118/80    Wt Readings from Last 3 Encounters:   12/05/19 97.4 kg (214 lb 11.2 oz)   04/23/19 92.5 kg (204 lb)   04/03/19 91.2 kg (201 lb)                  Patient Active Problem List   Diagnosis     Hyperlipidemia LDL goal <160     Recurrent basal cell carcinoma of skin     Onychomycosis     Hemorrhoids, unspecified hemorrhoid type     Benign prostatic hyperplasia with lower urinary tract symptoms, symptom details unspecified     SCC (squamous cell carcinoma), face     Past Surgical History:   Procedure Laterality Date     C NONSPECIFIC PROCEDURE      Hernia     COLONOSCOPY  2007     COLONOSCOPY  12/26/2012    Procedure: COLONOSCOPY;  Colonoscopy ;  Surgeon: Ranulfo Ramirez MD;  Location: [facility]     COLONOSCOPY N/A 12/20/2016    Procedure: COLONOSCOPY;  Surgeon: Paula Johnson MD;  Location: [facility]     HERNIA REPAIR         Social History     Tobacco Use     Smoking status: Never Smoker     Smokeless tobacco: Never Used   Substance Use Topics     Alcohol use: Yes     Frequency: 2-4 times a month     Drinks per session: 1 or 2     Binge frequency: Never     Comment: 2-3 drinks per week     Family History   Problem Relation Age of Onset     Cancer Maternal Grandmother         basal cell     Cerebrovascular Disease Maternal Grandmother      Cancer Maternal Grandfather         basal cell     Cerebrovascular Disease Paternal Grandmother  "     Alcohol/Drug Paternal Grandfather      Hypertension Mother      Heart Disease Father         anuerysm     Hypertension Father      Cancer Father         lung     Diabetes No family hx of      Colon Cancer No family hx of          Current Outpatient Medications   Medication Sig Dispense Refill     Acetaminophen (TYLENOL PO)          Reviewed and updated as needed this visit by clinical staff  Tobacco  Allergies  Soc Hx        Reviewed and updated as needed this visit by Provider        Past Medical History:   Diagnosis Date     Esophageal reflux      Hernia, abdominal      Other malignant neoplasm of skin of lower limb, including hip     BASAL CELL CA S/P RESECTION 2003     Other malignant neoplasm of skin of trunk, except scrotum     BASAL CELL CA S/P RESECTION 2003     Personal history of other disorders of nervous system and sense organs      Unspecified eustachian tube disorder         Review of Systems  CONSTITUTIONAL: NEGATIVE for fever, chills, change in weight  INTEGUMENTARY/SKIN: NEGATIVE for worrisome rashes, moles or lesions  EYES: NEGATIVE for vision changes or irritation  ENT: NEGATIVE for ear, mouth and throat problems  RESP: NEGATIVE for significant cough or SOB  CV: NEGATIVE for chest pain, palpitations or peripheral edema  GI: NEGATIVE for nausea, abdominal pain, heartburn, or change in bowel habits   male: negative for dysuria, hematuria, decreased urinary stream, erectile dysfunction, urethral discharge  MUSCULOSKELETAL: NEGATIVE for significant arthralgias or myalgia  NEURO: NEGATIVE for weakness, dizziness or paresthesias  PSYCHIATRIC: NEGATIVE for changes in mood or affect    OBJECTIVE:   /82 (BP Location: Right arm, Patient Position: Chair, Cuff Size: Adult Regular)   Pulse 70   Temp 98.1  F (36.7  C) (Oral)   Resp 18   Ht 1.854 m (6' 1\")   Wt 97.4 kg (214 lb 11.2 oz)   SpO2 97%   BMI 28.33 kg/m      Physical Exam  GENERAL: healthy, alert and no distress  EYES: Eyes " "grossly normal to inspection, PERRL and conjunctivae and sclerae normal  HENT: ear canals and TM's normal, nose and mouth without ulcers or lesions  NECK: no adenopathy, no asymmetry, masses, or scars and thyroid normal to palpation  RESP: lungs clear to auscultation - no rales, rhonchi or wheezes  CV: regular rate and rhythm, normal S1 S2, no S3 or S4, no murmur, click or rub, no peripheral edema and peripheral pulses strong  ABDOMEN: soft, nontender, no hepatosplenomegaly, no masses and bowel sounds normal  MS: no gross musculoskeletal defects noted, no edema  SKIN: no suspicious lesions or rashes  NEURO: Normal strength and tone, mentation intact and speech normal  PSYCH: mentation appears normal, affect normal/bright    Diagnostic Test Results:  Labs reviewed in Epic    ASSESSMENT/PLAN:   1. Routine general medical examination at a health care facility    2. Recurrent basal cell carcinoma of skin  - Follows with derm    3. SCC (squamous cell carcinoma), face    4. Benign prostatic hyperplasia with lower urinary tract symptoms, symptom details unspecified  - Stable. Denies LUTS    5. Hyperlipidemia LDL goal <160  - Will work on diet and exercise    6. Elevated BP without diagnosis of hypertension  - BP looks good today though he reports not drinking his usual 2 diet cokes in the morning. May be falsely lower. He reports having elevated BP readings outside of clinic, which would improve with weight loss. Continue to monitor        COUNSELING:   Reviewed preventive health counseling, as reflected in patient instructions       Regular exercise       Healthy diet/nutrition       Immunizations    Declined: Zoster due to Cost            Estimated body mass index is 28.33 kg/m  as calculated from the following:    Height as of this encounter: 1.854 m (6' 1\").    Weight as of this encounter: 97.4 kg (214 lb 11.2 oz).          reports that he has never smoked. He has never used smokeless tobacco.      Counseling " "Resources:  ATP IV Guidelines  Pooled Cohorts Equation Calculator  FRAX Risk Assessment  ICSI Preventive Guidelines  Dietary Guidelines for Americans, 2010  USDA's MyPlate  ASA Prophylaxis  Lung CA Screening    HOLGER Arechiga CNP  [facility] EAGANMedication Review  Patient has not been taking the prilosec, metamucil or multi-vitamin    Patient wants to discuss at this visit  Per patient, biggest concern is his blood pressure and cholesterol.    Pre-Visit Planning     Future Appointments   Date Time Provider Department Center   12/2/2019  9:00 AM [facility]   12/5/2019 10:00 AM Lissett Salinas APRN CNP [facility]     Arrival Time for this Appointment:  9:35 AM   Appointment Notes for this encounter:   physical last was 11/2018    Questionnaires Reviewed/Assigned  No additional questionnaires are needed     Patient preferred phone number: [phone]    Spoke to patient via phone. Patient does not have additional questions or concerns.        Visit is preventive. Reviewed purpose of preventive visit with patient.    Health Maintenance Due   Topic Date Due     ANNUAL REVIEW OF HM ORDERS  1970     HIV SCREENING  01/17/1983     ZOSTER IMMUNIZATION (1 of 2) 01/17/2018     INFLUENZA VACCINE (1) 09/01/2019     LIPID  11/21/2019     PREVENTIVE CARE VISIT  11/26/2019       [place]  Patient is active on [employer].    Call Summary  \"Thank you for your time today.  If anything comes up before your appointment, please feel free to contact us at [phone].\"  Poornima SANTACRUZ RN, BSN    "

## 2019-12-04 DIAGNOSIS — E78.5 HYPERLIPIDEMIA LDL GOAL <160: ICD-10-CM

## 2019-12-04 DIAGNOSIS — Z13.1 SCREENING FOR DIABETES MELLITUS: ICD-10-CM

## 2019-12-04 LAB
ALBUMIN SERPL-MCNC: 4 G/DL (ref 3.4–5)
ALP SERPL-CCNC: 76 U/L (ref 40–150)
ALT SERPL W P-5'-P-CCNC: 37 U/L (ref 0–70)
ANION GAP SERPL CALCULATED.3IONS-SCNC: 5 MMOL/L (ref 3–14)
AST SERPL W P-5'-P-CCNC: 17 U/L (ref 0–45)
BILIRUB SERPL-MCNC: 0.4 MG/DL (ref 0.2–1.3)
BUN SERPL-MCNC: 21 MG/DL (ref 7–30)
CALCIUM SERPL-MCNC: 9.3 MG/DL (ref 8.5–10.1)
CHLORIDE SERPL-SCNC: 105 MMOL/L (ref 94–109)
CHOLEST SERPL-MCNC: 257 MG/DL
CO2 SERPL-SCNC: 28 MMOL/L (ref 20–32)
CREAT SERPL-MCNC: 1.03 MG/DL (ref 0.66–1.25)
GFR SERPL CREATININE-BSD FRML MDRD: 83 ML/MIN/{1.73_M2}
GLUCOSE SERPL-MCNC: 93 MG/DL (ref 70–99)
HDLC SERPL-MCNC: 47 MG/DL
LDLC SERPL CALC-MCNC: 170 MG/DL
NONHDLC SERPL-MCNC: 210 MG/DL
POTASSIUM SERPL-SCNC: 4.1 MMOL/L (ref 3.4–5.3)
PROT SERPL-MCNC: 8.2 G/DL (ref 6.8–8.8)
SODIUM SERPL-SCNC: 138 MMOL/L (ref 133–144)
TRIGL SERPL-MCNC: 202 MG/DL

## 2019-12-04 PROCEDURE — 80061 LIPID PANEL: CPT | Performed by: NURSE PRACTITIONER

## 2019-12-04 PROCEDURE — 80053 COMPREHEN METABOLIC PANEL: CPT | Performed by: NURSE PRACTITIONER

## 2019-12-04 PROCEDURE — 36415 COLL VENOUS BLD VENIPUNCTURE: CPT | Performed by: NURSE PRACTITIONER

## 2019-12-05 ENCOUNTER — OFFICE VISIT (OUTPATIENT)
Dept: PEDIATRICS | Facility: CLINIC | Age: 49
End: 2019-12-05
Payer: COMMERCIAL

## 2019-12-05 VITALS
WEIGHT: 214.7 LBS | OXYGEN SATURATION: 97 % | BODY MASS INDEX: 28.46 KG/M2 | RESPIRATION RATE: 18 BRPM | TEMPERATURE: 98.1 F | DIASTOLIC BLOOD PRESSURE: 82 MMHG | SYSTOLIC BLOOD PRESSURE: 132 MMHG | HEIGHT: 73 IN | HEART RATE: 70 BPM

## 2019-12-05 DIAGNOSIS — C44.320 SCC (SQUAMOUS CELL CARCINOMA), FACE: ICD-10-CM

## 2019-12-05 DIAGNOSIS — Z00.00 ROUTINE GENERAL MEDICAL EXAMINATION AT A HEALTH CARE FACILITY: Primary | ICD-10-CM

## 2019-12-05 DIAGNOSIS — N40.1 BENIGN PROSTATIC HYPERPLASIA WITH LOWER URINARY TRACT SYMPTOMS, SYMPTOM DETAILS UNSPECIFIED: ICD-10-CM

## 2019-12-05 DIAGNOSIS — R03.0 ELEVATED BP WITHOUT DIAGNOSIS OF HYPERTENSION: ICD-10-CM

## 2019-12-05 DIAGNOSIS — C44.91: ICD-10-CM

## 2019-12-05 DIAGNOSIS — E78.5 HYPERLIPIDEMIA LDL GOAL <160: ICD-10-CM

## 2019-12-05 PROCEDURE — 99396 PREV VISIT EST AGE 40-64: CPT | Performed by: NURSE PRACTITIONER

## 2019-12-05 SDOH — HEALTH STABILITY: MENTAL HEALTH: HOW MANY STANDARD DRINKS CONTAINING ALCOHOL DO YOU HAVE ON A TYPICAL DAY?: 1 OR 2

## 2019-12-05 SDOH — ECONOMIC STABILITY: INCOME INSECURITY: HOW HARD IS IT FOR YOU TO PAY FOR THE VERY BASICS LIKE FOOD, HOUSING, MEDICAL CARE, AND HEATING?: NOT VERY HARD

## 2019-12-05 SDOH — HEALTH STABILITY: MENTAL HEALTH: HOW OFTEN DO YOU HAVE A DRINK CONTAINING ALCOHOL?: 2-4 TIMES A MONTH

## 2019-12-05 SDOH — HEALTH STABILITY: MENTAL HEALTH
STRESS IS WHEN SOMEONE FEELS TENSE, NERVOUS, ANXIOUS, OR CAN'T SLEEP AT NIGHT BECAUSE THEIR MIND IS TROUBLED. HOW STRESSED ARE YOU?: TO SOME EXTENT

## 2019-12-05 SDOH — SOCIAL STABILITY: SOCIAL NETWORK: HOW OFTEN DO YOU GET TOGETHER WITH FRIENDS OR RELATIVES?: TWICE A WEEK

## 2019-12-05 SDOH — SOCIAL STABILITY: SOCIAL NETWORK: HOW OFTEN DO YOU ATTENT MEETINGS OF THE CLUB OR ORGANIZATION YOU BELONG TO?: MORE THAN 4 TIMES PER YEAR

## 2019-12-05 SDOH — HEALTH STABILITY: PHYSICAL HEALTH: ON AVERAGE, HOW MANY DAYS PER WEEK DO YOU ENGAGE IN MODERATE TO STRENUOUS EXERCISE (LIKE A BRISK WALK)?: 2 DAYS

## 2019-12-05 SDOH — SOCIAL STABILITY: SOCIAL NETWORK: IN A TYPICAL WEEK, HOW MANY TIMES DO YOU TALK ON THE PHONE WITH FAMILY, FRIENDS, OR NEIGHBORS?: TWICE A WEEK

## 2019-12-05 SDOH — ECONOMIC STABILITY: TRANSPORTATION INSECURITY
IN THE PAST 12 MONTHS, HAS LACK OF TRANSPORTATION KEPT YOU FROM MEETINGS, WORK, OR FROM GETTING THINGS NEEDED FOR DAILY LIVING?: NO

## 2019-12-05 SDOH — SOCIAL STABILITY: SOCIAL NETWORK
DO YOU BELONG TO ANY CLUBS OR ORGANIZATIONS SUCH AS CHURCH GROUPS UNIONS, FRATERNAL OR ATHLETIC GROUPS, OR SCHOOL GROUPS?: YES

## 2019-12-05 SDOH — SOCIAL STABILITY: SOCIAL NETWORK: ARE YOU MARRIED, WIDOWED, DIVORCED, SEPARATED, NEVER MARRIED, OR LIVING WITH A PARTNER?: NEVER MARRIED

## 2019-12-05 SDOH — ECONOMIC STABILITY: TRANSPORTATION INSECURITY
IN THE PAST 12 MONTHS, HAS THE LACK OF TRANSPORTATION KEPT YOU FROM MEDICAL APPOINTMENTS OR FROM GETTING MEDICATIONS?: NO

## 2019-12-05 SDOH — ECONOMIC STABILITY: FOOD INSECURITY: WITHIN THE PAST 12 MONTHS, THE FOOD YOU BOUGHT JUST DIDN'T LAST AND YOU DIDN'T HAVE MONEY TO GET MORE.: NEVER TRUE

## 2019-12-05 SDOH — HEALTH STABILITY: PHYSICAL HEALTH: ON AVERAGE, HOW MANY MINUTES DO YOU ENGAGE IN EXERCISE AT THIS LEVEL?: 40 MIN

## 2019-12-05 SDOH — SOCIAL STABILITY: SOCIAL NETWORK: HOW OFTEN DO YOU ATTEND CHURCH OR RELIGIOUS SERVICES?: 1 TO 4 TIMES PER YEAR

## 2019-12-05 SDOH — HEALTH STABILITY: MENTAL HEALTH: HOW OFTEN DO YOU HAVE 6 OR MORE DRINKS ON ONE OCCASION?: NEVER

## 2019-12-05 SDOH — ECONOMIC STABILITY: FOOD INSECURITY: WITHIN THE PAST 12 MONTHS, YOU WORRIED THAT YOUR FOOD WOULD RUN OUT BEFORE YOU GOT MONEY TO BUY MORE.: NEVER TRUE

## 2019-12-05 ASSESSMENT — ENCOUNTER SYMPTOMS
NERVOUS/ANXIOUS: 0
DIZZINESS: 0
HEARTBURN: 1
ARTHRALGIAS: 0
MYALGIAS: 0
NAUSEA: 0
CHILLS: 0
DIARRHEA: 0
JOINT SWELLING: 0
ABDOMINAL PAIN: 0
HEMATURIA: 0
EYE PAIN: 0
HEMATOCHEZIA: 0
FREQUENCY: 1
SHORTNESS OF BREATH: 0
CONSTIPATION: 0
DYSURIA: 0
PARESTHESIAS: 0
WEAKNESS: 0
PALPITATIONS: 0
SORE THROAT: 0
COUGH: 0

## 2019-12-05 ASSESSMENT — MIFFLIN-ST. JEOR: SCORE: 1882.75

## 2019-12-05 NOTE — PATIENT INSTRUCTIONS
Your cholesterol increased from last year and you have also gained weight.   Work on manageable diet changes and work on increasing exercise  If your cholesterol goes up again next year we will have to start you on a medication    Check with your insurance on coverage for the Shingrix vaccine. You can schedule a visit with our ancillary staff for the vaccine. You will susi to schedule a booster within 2-6 months after the initial vaccine.       Preventive Health Recommendations  Male Ages 50 - 64    Yearly exam:             See your health care provider every year in order to  o   Review health changes.   o   Discuss preventive care.    o   Review your medicines if your doctor has prescribed any.     Have a cholesterol test every 5 years, or more frequently if you are at risk for high cholesterol/heart disease.     Have a diabetes test (fasting glucose) every three years. If you are at risk for diabetes, you should have this test more often.     Have a colonoscopy at age 50, or have a yearly FIT test (stool test). These exams will check for colon cancer.      Talk with your health care provider about whether or not a prostate cancer screening test (PSA) is right for you.    You should be tested each year for STDs (sexually transmitted diseases), if you re at risk.     Shots: Get a flu shot each year. Get a tetanus shot every 10 years.     Nutrition:    Eat at least 5 servings of fruits and vegetables daily.     Eat whole-grain bread, whole-wheat pasta and brown rice instead of white grains and rice.     Get adequate Calcium and Vitamin D.     Lifestyle    Exercise for at least 150 minutes a week (30 minutes a day, 5 days a week). This will help you control your weight and prevent disease.     Limit alcohol to one drink per day.     No smoking.     Wear sunscreen to prevent skin cancer.     See your dentist every six months for an exam and cleaning.     See your eye doctor every 1 to 2 years.

## 2020-02-16 ENCOUNTER — HEALTH MAINTENANCE LETTER (OUTPATIENT)
Age: 50
End: 2020-02-16

## 2020-03-16 ENCOUNTER — OFFICE VISIT (OUTPATIENT)
Dept: PEDIATRICS | Facility: CLINIC | Age: 50
End: 2020-03-16
Payer: COMMERCIAL

## 2020-03-16 VITALS
OXYGEN SATURATION: 96 % | DIASTOLIC BLOOD PRESSURE: 88 MMHG | WEIGHT: 214 LBS | HEART RATE: 84 BPM | TEMPERATURE: 98.5 F | RESPIRATION RATE: 16 BRPM | BODY MASS INDEX: 28.23 KG/M2 | SYSTOLIC BLOOD PRESSURE: 140 MMHG

## 2020-03-16 DIAGNOSIS — I10 BENIGN ESSENTIAL HYPERTENSION: Primary | ICD-10-CM

## 2020-03-16 PROCEDURE — 99214 OFFICE O/P EST MOD 30 MIN: CPT | Performed by: PHYSICIAN ASSISTANT

## 2020-03-16 RX ORDER — HYDROCHLOROTHIAZIDE 25 MG/1
25 TABLET ORAL DAILY
Qty: 30 TABLET | Refills: 1 | Status: SHIPPED | OUTPATIENT
Start: 2020-03-16 | End: 2020-03-27

## 2020-03-16 NOTE — PROGRESS NOTES
Subjective     Manny Pitt is a 50 year old male who presents to clinic today for the following health issues:    HPI   Hypertension      Do you check your blood pressure regularly outside of the clinic? Yes - intermittent    Are you following a low salt diet? No    Are your blood pressures ever more than 140 on the top number (systolic) OR more   than 90 on the bottom number (diastolic), for example 140/90? Yes    How many days per week do you exercise enough to make your heart beat faster? 3 or less    How many minutes a day do you exercise enough to make your heart beat faster? 30 - 60     Patient has a history of borderline htn. Over the past week, he notes his values have been highest 170s/90s--usually 140s/80s. Mild headache intermittently. No cp, tightness, sob, lightheadedness, nausea, LE edema.     He has never been on antihypertensives. Stress test completed one year ago and wnl. Patient is a runner and able to exercise fully. He is trying to lose weight without success.     Borderline HLD. No history of diabetes, CAD, CVA.     History of CVA--grandmother.     Review of Systems   ROS COMP: Constitutional, HEENT, cardiovascular, pulmonary, gi and gu systems are negative, except as otherwise noted.      Objective    BP (!) 140/88 (BP Location: Left arm, Patient Position: Sitting, Cuff Size: Adult Regular)   Pulse 84   Temp 98.5  F (36.9  C) (Tympanic)   Resp 16   Wt 97.1 kg (214 lb)   SpO2 96%   BMI 28.23 kg/m    Body mass index is 28.23 kg/m .  Physical Exam   GENERAL: alert and no distress  NECK: no adenopathy  RESP: lungs clear to auscultation - no rales, rhonchi or wheezes  CV: regular rate and rhythm, normal S1 S2, no S3 or S4  LE: no edema    Diagnostic Test Results:  No results found for this or any previous visit (from the past 24 hour(s)).      Assessment & Plan   (I10) Benign essential hypertension  (primary encounter diagnosis)  Comment: discussed in detail. Patient would like to start  medication. Begin hydrochlorothiazide in the AM. Discussed limiting salt intake. Continue with weight loss goals. Labs in 3 weeks. Stop in at pharmacy to have BP check.  Plan: Plan: hydrochlorothiazide (HYDRODIURIL) 25 MG tablet,        **Basic metabolic panel FUTURE anytime                Carla Perez PA-C  [facility]

## 2020-03-27 ENCOUNTER — E-VISIT (OUTPATIENT)
Dept: PEDIATRICS | Facility: CLINIC | Age: 50
End: 2020-03-27
Payer: COMMERCIAL

## 2020-03-27 DIAGNOSIS — I10 BENIGN ESSENTIAL HYPERTENSION: Primary | ICD-10-CM

## 2020-03-27 PROCEDURE — 99421 OL DIG E/M SVC 5-10 MIN: CPT | Performed by: INTERNAL MEDICINE

## 2020-03-27 RX ORDER — AMLODIPINE BESYLATE 5 MG/1
5 TABLET ORAL DAILY
Qty: 30 TABLET | Refills: 0 | Status: SHIPPED | OUTPATIENT
Start: 2020-03-27 | End: 2020-03-27

## 2020-04-07 DIAGNOSIS — I10 BENIGN ESSENTIAL HYPERTENSION: ICD-10-CM

## 2020-04-07 RX ORDER — HYDROCHLOROTHIAZIDE 25 MG/1
TABLET ORAL
Qty: 30 TABLET | Refills: 1 | OUTPATIENT
Start: 2020-04-07

## 2020-04-13 ENCOUNTER — MYC MEDICAL ADVICE (OUTPATIENT)
Dept: PEDIATRICS | Facility: CLINIC | Age: 50
End: 2020-04-13

## 2020-04-13 DIAGNOSIS — I10 BENIGN ESSENTIAL HYPERTENSION: ICD-10-CM

## 2020-04-14 RX ORDER — AMLODIPINE BESYLATE 5 MG/1
5 TABLET ORAL DAILY
Qty: 90 TABLET | Refills: 3 | Status: SHIPPED | OUTPATIENT
Start: 2020-04-14 | End: 2021-01-11

## 2020-11-04 ENCOUNTER — TELEPHONE (OUTPATIENT)
Dept: PEDIATRICS | Facility: CLINIC | Age: 50
End: 2020-11-04

## 2020-11-04 DIAGNOSIS — Z00.00 ENCOUNTER FOR WELLNESS EXAMINATION: ICD-10-CM

## 2020-11-04 DIAGNOSIS — I10 BENIGN ESSENTIAL HYPERTENSION: ICD-10-CM

## 2020-11-04 DIAGNOSIS — E78.5 HYPERLIPIDEMIA LDL GOAL <160: Primary | ICD-10-CM

## 2020-11-04 NOTE — TELEPHONE ENCOUNTER
Patient wants to schedule labs prior to physical. Please put in the orders and call patient to schedule labs before 12/30/20 appointment. Call [phone] and can leave a message.

## 2020-11-05 NOTE — TELEPHONE ENCOUNTER
Orders entered.   Routing to MA/TC Team to help patient set up a fasting lab only appointment before his 12/30/2020 appointment.

## 2020-11-09 NOTE — TELEPHONE ENCOUNTER
Looked at schedule for pt, he has OV and lab set up in  January.    Slime Bojorquez MA on 11/9/2020 at 2:56 PM

## 2021-01-04 DIAGNOSIS — E78.5 HYPERLIPIDEMIA LDL GOAL <160: ICD-10-CM

## 2021-01-04 DIAGNOSIS — I10 BENIGN ESSENTIAL HYPERTENSION: ICD-10-CM

## 2021-01-04 DIAGNOSIS — Z00.00 ENCOUNTER FOR WELLNESS EXAMINATION: ICD-10-CM

## 2021-01-04 LAB
ALBUMIN SERPL-MCNC: 3.7 G/DL (ref 3.4–5)
ALP SERPL-CCNC: 82 U/L (ref 40–150)
ALT SERPL W P-5'-P-CCNC: 49 U/L (ref 0–70)
ANION GAP SERPL CALCULATED.3IONS-SCNC: 4 MMOL/L (ref 3–14)
AST SERPL W P-5'-P-CCNC: 24 U/L (ref 0–45)
BILIRUB SERPL-MCNC: 0.4 MG/DL (ref 0.2–1.3)
BUN SERPL-MCNC: 17 MG/DL (ref 7–30)
CALCIUM SERPL-MCNC: 8.8 MG/DL (ref 8.5–10.1)
CHLORIDE SERPL-SCNC: 109 MMOL/L (ref 94–109)
CHOLEST SERPL-MCNC: 246 MG/DL
CO2 SERPL-SCNC: 26 MMOL/L (ref 20–32)
CREAT SERPL-MCNC: 1 MG/DL (ref 0.66–1.25)
GFR SERPL CREATININE-BSD FRML MDRD: 86 ML/MIN/{1.73_M2}
GLUCOSE SERPL-MCNC: 94 MG/DL (ref 70–99)
HDLC SERPL-MCNC: 44 MG/DL
LDLC SERPL CALC-MCNC: 168 MG/DL
NONHDLC SERPL-MCNC: 202 MG/DL
POTASSIUM SERPL-SCNC: 4 MMOL/L (ref 3.4–5.3)
PROT SERPL-MCNC: 7.9 G/DL (ref 6.8–8.8)
SODIUM SERPL-SCNC: 139 MMOL/L (ref 133–144)
TRIGL SERPL-MCNC: 170 MG/DL

## 2021-01-04 PROCEDURE — 36415 COLL VENOUS BLD VENIPUNCTURE: CPT | Performed by: INTERNAL MEDICINE

## 2021-01-04 PROCEDURE — 80061 LIPID PANEL: CPT | Performed by: INTERNAL MEDICINE

## 2021-01-04 PROCEDURE — 80053 COMPREHEN METABOLIC PANEL: CPT | Performed by: INTERNAL MEDICINE

## 2021-01-08 NOTE — PROGRESS NOTES
3  SUBJECTIVE:   CC: Manny Pitt is an 50 year old male who presents for preventive health visit.     Patient has been advised of split billing requirements and indicates understanding: No     Healthy Habits:  Answers for HPI/ROS submitted by the patient on 1/11/2021   Annual Exam:  Frequency of exercise:: 1 day/week  Getting at least 3 servings of Calcium per day:: Yes  Diet:: Low salt, Low fat/cholesterol  Taking medications regularly:: Yes  Medication side effects:: None  Bi-annual eye exam:: Yes  Dental care twice a year:: NO  Sleep apnea or symptoms of sleep apnea:: Excessive snoring  abdominal pain: No  Blood in stool: No  Blood in urine: No  chest pain: No  chills: No  congestion: No  constipation: No  cough: No  diarrhea: No  dizziness: No  ear pain: No  eye pain: Yes  nervous/anxious: No  frequency: No  genital sores: No  headaches: Yes  impotence: No  penile discharge: No  Additional concerns today:: yes, follow-up hyperlipidemia  Duration of exercise:: Less than 15 minutes       Today's PHQ-2 Score:   PHQ-2 ( 1999 [employer]) 1/11/2021 1/11/2021   Q1: Little interest or pleasure in doing things 1 -   Q2: Feeling down, depressed or hopeless 1 -   PHQ-2 Score 2 -   Q1: Little interest or pleasure in doing things Several days Several days   Q2: Feeling down, depressed or hopeless Several days Several days   PHQ-2 Score 2 2       Abuse: Current or Past(Physical, Sexual or Emotional)- No  Do you feel safe in your environment? Yes        Social History     Tobacco Use     Smoking status: Never Smoker     Smokeless tobacco: Never Used   Substance Use Topics     Alcohol use: Yes     Frequency: 2-4 times a month     Drinks per session: 1 or 2     Binge frequency: Never     Comment: 2-3 drinks per week     If you drink alcohol do you typically have >3 drinks per day or >7 drinks per week? No                      Last PSA: No results found for: PSA    Reviewed orders with patient. Reviewed health maintenance and  updated orders accordingly - Yes  Lab work is in process    Reviewed and updated as needed this visit by clinical staff  Tobacco  Allergies    Med Hx  Surg Hx  Fam Hx  Soc Hx        Reviewed and updated as needed this visit by Provider                Past Medical History:   Diagnosis Date     Esophageal reflux      Hernia, abdominal      Other malignant neoplasm of skin of lower limb, including hip     BASAL CELL CA S/P RESECTION 2003     Other malignant neoplasm of skin of trunk, except scrotum     BASAL CELL CA S/P RESECTION 2003     Personal history of other disorders of nervous system and sense organs      Unspecified eustachian tube disorder       Past Surgical History:   Procedure Laterality Date     COLONOSCOPY  2007     COLONOSCOPY  12/26/2012    Procedure: COLONOSCOPY;  Colonoscopy ;  Surgeon: Ranulfo Ramirez MD;  Location:  GI     COLONOSCOPY N/A 12/20/2016    Procedure: COLONOSCOPY;  Surgeon: Paula Johnson MD;  Location:  GI     HERNIA REPAIR       RUST NONSPECIFIC PROCEDURE      Hernia     Current Outpatient Medications   Medication Sig Dispense Refill     amLODIPine (NORVASC) 5 MG tablet Take 1 tablet (5 mg) by mouth daily 90 tablet 3     pravastatin (PRAVACHOL) 20 MG tablet Take 1 tablet (20 mg) by mouth daily 30 tablet 3     Acetaminophen (TYLENOL PO)         ROS:  CONSTITUTIONAL: NEGATIVE for fever, chills, change in weight  INTEGUMENTARY/SKIN: NEGATIVE for worrisome rashes, moles or lesions  EYES: NEGATIVE for vision changes or irritation  ENT: NEGATIVE for ear, mouth and throat problems  RESP: NEGATIVE for significant cough or SOB  CV: NEGATIVE for chest pain, palpitations or peripheral edema  GI: NEGATIVE for nausea, abdominal pain, heartburn, or change in bowel habits   male: negative for dysuria, hematuria, decreased urinary stream, erectile dysfunction, urethral discharge  MUSCULOSKELETAL: NEGATIVE for significant arthralgias or myalgia  NEURO: NEGATIVE for weakness,  "dizziness or paresthesias  PSYCHIATRIC: NEGATIVE for changes in mood or affect    OBJECTIVE:   /78 (BP Location: Right arm, Patient Position: Sitting, Cuff Size: Adult Regular)   Pulse 83   Temp 98.9  F (37.2  C) (Tympanic)   Ht 1.88 m (6' 2\")   Wt 97.5 kg (214 lb 14.4 oz)   SpO2 97%   BMI 27.59 kg/m    EXAM:  GENERAL: healthy, alert and no distress  EYES: Eyes grossly normal to inspection, PERRL and conjunctivae and sclerae normal  HENT: ear canals and TM's normal, nose and mouth without ulcers or lesions  NECK: no adenopathy, no asymmetry, masses, or scars and thyroid normal to palpation  RESP: lungs clear to auscultation - no rales, rhonchi or wheezes  CV: regular rate and rhythm, normal S1 S2, no S3 or S4, no murmur, click or rub, no peripheral edema and peripheral pulses strong  ABDOMEN: soft, nontender, no hepatosplenomegaly, no masses and bowel sounds normal  MS: no gross musculoskeletal defects noted, no edema  SKIN: no suspicious lesions or rashes  NEURO: Normal strength and tone, mentation intact and speech normal  PSYCH: mentation appears normal, affect normal/bright    The 10-year ASCVD risk score (Saadjosephine WEST Jr., et al., 2013) is: 7.9%    Values used to calculate the score:      Age: 50 years      Sex: Male      Is Non- : No      Diabetic: No      Tobacco smoker: No      Systolic Blood Pressure: 134 mmHg      Is BP treated: Yes      HDL Cholesterol: 44 mg/dL      Total Cholesterol: 246 mg/dL      ASSESSMENT/PLAN:       ICD-10-CM    1. Routine general medical examination at a health care facility  Z00.00 REVIEW OF HEALTH MAINTENANCE PROTOCOL ORDERS     2. Benign essential hypertension  I10 amLODIPine (NORVASC) 5 MG tablet     Adequate control.  Continue current medication.      3. Hyperlipidemia LDL goal <160  E78.5 pravastatin (PRAVACHOL) 20 MG tablet      Did not tolerate crestor.   10yr risk reviewed.  Start trial of pravastatin, repeat labwork 2 months     4. " "Recurrent basal cell carcinoma of skin  C44.91 Annual dermatology follow-up     5. Screening for prostate cancer  Z12.5 Prostate spec antigen screen       Patient has been advised of split billing requirements and indicates understanding: Yes  COUNSELING:  Reviewed preventive health counseling, as reflected in patient instructions       Regular exercise       Healthy diet/nutrition       Colon cancer screening       Prostate cancer screening    Estimated body mass index is 27.59 kg/m  as calculated from the following:    Height as of this encounter: 1.88 m (6' 2\").    Weight as of this encounter: 97.5 kg (214 lb 14.4 oz).      He reports that he has never smoked. He has never used smokeless tobacco.      Counseling Resources:  ATP IV Guidelines  Pooled Cohorts Equation Calculator  FRAX Risk Assessment  ICSI Preventive Guidelines  Dietary Guidelines for Americans, 2010  USDA's MyPlate  ASA Prophylaxis  Lung CA Screening    Ranulfo Neff MD, MD  [facility]  "

## 2021-01-11 ENCOUNTER — OFFICE VISIT (OUTPATIENT)
Dept: PEDIATRICS | Facility: CLINIC | Age: 51
End: 2021-01-11
Payer: COMMERCIAL

## 2021-01-11 VITALS
OXYGEN SATURATION: 97 % | DIASTOLIC BLOOD PRESSURE: 78 MMHG | TEMPERATURE: 98.9 F | BODY MASS INDEX: 27.58 KG/M2 | SYSTOLIC BLOOD PRESSURE: 134 MMHG | HEIGHT: 74 IN | HEART RATE: 83 BPM | WEIGHT: 214.9 LBS

## 2021-01-11 DIAGNOSIS — C44.91: ICD-10-CM

## 2021-01-11 DIAGNOSIS — I10 BENIGN ESSENTIAL HYPERTENSION: ICD-10-CM

## 2021-01-11 DIAGNOSIS — Z00.00 ROUTINE GENERAL MEDICAL EXAMINATION AT A HEALTH CARE FACILITY: Primary | ICD-10-CM

## 2021-01-11 DIAGNOSIS — Z12.5 SCREENING FOR PROSTATE CANCER: ICD-10-CM

## 2021-01-11 DIAGNOSIS — E78.5 HYPERLIPIDEMIA LDL GOAL <160: ICD-10-CM

## 2021-01-11 PROCEDURE — G0103 PSA SCREENING: HCPCS | Performed by: INTERNAL MEDICINE

## 2021-01-11 PROCEDURE — 36415 COLL VENOUS BLD VENIPUNCTURE: CPT | Performed by: INTERNAL MEDICINE

## 2021-01-11 PROCEDURE — 99213 OFFICE O/P EST LOW 20 MIN: CPT | Mod: 25 | Performed by: INTERNAL MEDICINE

## 2021-01-11 PROCEDURE — 99396 PREV VISIT EST AGE 40-64: CPT | Performed by: INTERNAL MEDICINE

## 2021-01-11 RX ORDER — PRAVASTATIN SODIUM 20 MG
20 TABLET ORAL DAILY
Qty: 30 TABLET | Refills: 3 | Status: SHIPPED | OUTPATIENT
Start: 2021-01-11 | End: 2021-04-30

## 2021-01-11 RX ORDER — AMLODIPINE BESYLATE 5 MG/1
5 TABLET ORAL DAILY
Qty: 90 TABLET | Refills: 3 | Status: SHIPPED | OUTPATIENT
Start: 2021-01-11 | End: 2022-01-19

## 2021-01-11 ASSESSMENT — ENCOUNTER SYMPTOMS
CONSTIPATION: 0
DIZZINESS: 0
HEMATOCHEZIA: 0
EYE PAIN: 1
CHILLS: 0
COUGH: 0
HEMATURIA: 0
ABDOMINAL PAIN: 0
DIARRHEA: 0
FREQUENCY: 0
NERVOUS/ANXIOUS: 0
HEADACHES: 1

## 2021-01-11 ASSESSMENT — MIFFLIN-ST. JEOR: SCORE: 1894.53

## 2021-01-12 LAB — PSA SERPL-ACNC: 2.28 UG/L (ref 0–4)

## 2021-04-05 DIAGNOSIS — Z11.4 SCREENING FOR HIV (HUMAN IMMUNODEFICIENCY VIRUS): ICD-10-CM

## 2021-04-05 DIAGNOSIS — Z11.59 NEED FOR HEPATITIS C SCREENING TEST: ICD-10-CM

## 2021-04-05 DIAGNOSIS — R82.90 URINE ABNORMALITY: ICD-10-CM

## 2021-04-05 LAB
ALBUMIN UR-MCNC: NEGATIVE MG/DL
AMORPH CRY #/AREA URNS HPF: ABNORMAL /HPF
APPEARANCE UR: CLEAR
BILIRUB UR QL STRIP: NEGATIVE
COLOR UR AUTO: YELLOW
GLUCOSE UR STRIP-MCNC: NEGATIVE MG/DL
HGB UR QL STRIP: NEGATIVE
KETONES UR STRIP-MCNC: NEGATIVE MG/DL
LEUKOCYTE ESTERASE UR QL STRIP: NEGATIVE
MUCOUS THREADS #/AREA URNS LPF: PRESENT /LPF
NITRATE UR QL: NEGATIVE
NON-SQ EPI CELLS #/AREA URNS LPF: ABNORMAL /LPF
PH UR STRIP: 7 PH (ref 5–7)
RBC #/AREA URNS AUTO: ABNORMAL /HPF
SOURCE: ABNORMAL
SP GR UR STRIP: 1.02 (ref 1–1.03)
UROBILINOGEN UR STRIP-ACNC: 0.2 EU/DL (ref 0.2–1)
WBC #/AREA URNS AUTO: ABNORMAL /HPF

## 2021-04-05 PROCEDURE — 81001 URINALYSIS AUTO W/SCOPE: CPT | Performed by: INTERNAL MEDICINE

## 2021-04-07 ENCOUNTER — OFFICE VISIT (OUTPATIENT)
Dept: PEDIATRICS | Facility: CLINIC | Age: 51
End: 2021-04-07
Payer: COMMERCIAL

## 2021-04-07 VITALS
TEMPERATURE: 97.9 F | SYSTOLIC BLOOD PRESSURE: 120 MMHG | DIASTOLIC BLOOD PRESSURE: 70 MMHG | OXYGEN SATURATION: 100 % | HEART RATE: 68 BPM | RESPIRATION RATE: 18 BRPM | BODY MASS INDEX: 27.57 KG/M2 | WEIGHT: 214.7 LBS

## 2021-04-07 DIAGNOSIS — E78.5 HYPERLIPIDEMIA LDL GOAL <160: ICD-10-CM

## 2021-04-07 DIAGNOSIS — R35.0 URINARY FREQUENCY: Primary | ICD-10-CM

## 2021-04-07 PROCEDURE — 99214 OFFICE O/P EST MOD 30 MIN: CPT | Performed by: INTERNAL MEDICINE

## 2021-04-07 NOTE — PROGRESS NOTES
"    Assessment & Plan     (R35.0) Urinary frequency  (primary encounter diagnosis)  Comment:    Longstanding urinary frequency.  Recommend eliminating caffeine and other bladder irritants.  If symptoms persisted recommend follow-up with urology to complete work-up including urodynamics and cystoscopy  Plan: UROLOGY ADULT REFERRAL          (E78.5) Hyperlipidemia LDL goal <160  Comment:   Plan: Lipid panel reviewed.  Concerns regarding \"foamy urine\".  Patient describes this as foam that he notes in the toilet after urinating/without urine discoloration or dysuria.  Recent urinalysis obtained reviewed and is essentially normal.  Did review listed medication side effects in [employer]-this urinary symptoms not listed as a side effect.  Regardless symptom seems benign and did recommend resuming pravastatin and returning for fasting lab work 2 months.    Return in about 1 year (around 4/7/2022).    Ranulfo Neff MD  [facility] Lucero is a 51 year old who presents for the following health issues     History of Present Illness       Hyperlipidemia:  He presents for follow up of hyperlipidemia.  He is taking medication to lower cholesterol. He is having myalgia or other side effects to statin medications.    He eats 2-3 servings of fruits and vegetables daily.He consumes 2 sweetened beverage(s) daily.He exercises with enough effort to increase his heart rate 20 to 29 minutes per day.  He exercises with enough effort to increase his heart rate 4 days per week. He is missing 5 dose(s) of medications per week.  He is not taking prescribed medications regularly due to side effects.        Hyperlipidemia follow-up.  Previously prescribed pravastatin.  Patient notes \"foamy urine\" that was concerning for him such that he stopped the medication.  Reports that he had similar symptoms a few years ago after starting atorvastatin.  Patient was concerned because this urine description was noted as a " possible side effect.  Denies dysuria or visible hematuria.  Does give a history of longstanding urinary frequency: Estimates that he urinates as much as 15 times daily or every other hour, less so at night.  Habits: Drinks 2 caffeinated sodas daily and frequent water throughout the day.  Previously referred to urology regarding urinary symptoms-testing including urodynamics and cystoscopy were discussed but not completed.  Patient did not follow-up.  Urinary frequency has been present for several years at this point.  Recent Labs   Lab Test 01/04/21  0728 12/04/19  0845 02/27/15  0758 02/27/15  0758 01/31/14  0759   CHOL 246* 257*   < > 228* 206*   HDL 44 47   < > 49 42   * 170*   < > 152* 136*   TRIG 170* 202*   < > 137 140   CHOLHDLRATIO  --   --   --  4.7 4.9    < > = values in this interval not displayed.      Patient Active Problem List   Diagnosis     Hyperlipidemia LDL goal <160     Recurrent basal cell carcinoma of skin     Onychomycosis     Hemorrhoids, unspecified hemorrhoid type     Benign prostatic hyperplasia with lower urinary tract symptoms, symptom details unspecified     SCC (squamous cell carcinoma), face     Current Outpatient Medications   Medication Sig Dispense Refill     Acetaminophen (TYLENOL PO)        amLODIPine (NORVASC) 5 MG tablet Take 1 tablet (5 mg) by mouth daily 90 tablet 3     pravastatin (PRAVACHOL) 20 MG tablet Take 1 tablet (20 mg) by mouth daily 30 tablet 3          Objective    /70 (BP Location: Right arm, Patient Position: Sitting, Cuff Size: Adult Large)   Pulse 68   Temp 97.9  F (36.6  C) (Tympanic)   Resp 18   Wt 97.4 kg (214 lb 11.2 oz)   SpO2 100%   BMI 27.57 kg/m    Body mass index is 27.57 kg/m .  Physical Exam   GENERAL: healthy, alert and no distress  EYES: Eyes grossly normal to inspection, PERRL and conjunctivae and sclerae normal  MS: no gross musculoskeletal defects noted, no edema  PSYCH: mentation appears normal, affect normal/bright

## 2021-04-07 NOTE — PATIENT INSTRUCTIONS
1-ok to resume pravastatin,   Fasting labwork 2 months  2-eliminate caffeine, if frequency persists plan for follow-up with Urology (cystoscopy)

## 2021-04-30 DIAGNOSIS — E78.5 HYPERLIPIDEMIA LDL GOAL <160: ICD-10-CM

## 2021-04-30 RX ORDER — PRAVASTATIN SODIUM 20 MG
TABLET ORAL
Qty: 90 TABLET | Refills: 2 | Status: SHIPPED | OUTPATIENT
Start: 2021-04-30 | End: 2022-01-19

## 2021-09-19 ENCOUNTER — HEALTH MAINTENANCE LETTER (OUTPATIENT)
Age: 51
End: 2021-09-19

## 2022-01-19 ENCOUNTER — OFFICE VISIT (OUTPATIENT)
Dept: PEDIATRICS | Facility: CLINIC | Age: 52
End: 2022-01-19
Payer: COMMERCIAL

## 2022-01-19 VITALS
WEIGHT: 223 LBS | TEMPERATURE: 98.1 F | RESPIRATION RATE: 16 BRPM | OXYGEN SATURATION: 95 % | HEIGHT: 74 IN | DIASTOLIC BLOOD PRESSURE: 88 MMHG | HEART RATE: 67 BPM | BODY MASS INDEX: 28.62 KG/M2 | SYSTOLIC BLOOD PRESSURE: 124 MMHG

## 2022-01-19 DIAGNOSIS — Z00.00 ROUTINE GENERAL MEDICAL EXAMINATION AT A HEALTH CARE FACILITY: Primary | ICD-10-CM

## 2022-01-19 DIAGNOSIS — I10 BENIGN ESSENTIAL HYPERTENSION: ICD-10-CM

## 2022-01-19 DIAGNOSIS — E78.5 HYPERLIPIDEMIA LDL GOAL <160: ICD-10-CM

## 2022-01-19 DIAGNOSIS — C44.91: ICD-10-CM

## 2022-01-19 PROBLEM — Z11.59 NEED FOR HEPATITIS C SCREENING TEST: Status: ACTIVE | Noted: 2022-01-19

## 2022-01-19 LAB
ALBUMIN SERPL-MCNC: 3.8 G/DL (ref 3.4–5)
ALP SERPL-CCNC: 81 U/L (ref 40–150)
ALT SERPL W P-5'-P-CCNC: 45 U/L (ref 0–70)
ANION GAP SERPL CALCULATED.3IONS-SCNC: 7 MMOL/L (ref 3–14)
AST SERPL W P-5'-P-CCNC: 18 U/L (ref 0–45)
BILIRUB SERPL-MCNC: 0.5 MG/DL (ref 0.2–1.3)
BUN SERPL-MCNC: 13 MG/DL (ref 7–30)
CALCIUM SERPL-MCNC: 9.1 MG/DL (ref 8.5–10.1)
CHLORIDE BLD-SCNC: 107 MMOL/L (ref 94–109)
CHOLEST SERPL-MCNC: 203 MG/DL
CO2 SERPL-SCNC: 25 MMOL/L (ref 20–32)
CREAT SERPL-MCNC: 1.06 MG/DL (ref 0.66–1.25)
FASTING STATUS PATIENT QL REPORTED: YES
GFR SERPL CREATININE-BSD FRML MDRD: 83 ML/MIN/1.73M2
GLUCOSE BLD-MCNC: 95 MG/DL (ref 70–99)
HCV AB SERPL QL IA: NONREACTIVE
HDLC SERPL-MCNC: 45 MG/DL
HIV 1+2 AB+HIV1 P24 AG SERPL QL IA: NONREACTIVE
LDLC SERPL CALC-MCNC: 120 MG/DL
NONHDLC SERPL-MCNC: 158 MG/DL
POTASSIUM BLD-SCNC: 3.9 MMOL/L (ref 3.4–5.3)
PROT SERPL-MCNC: 7.9 G/DL (ref 6.8–8.8)
SODIUM SERPL-SCNC: 139 MMOL/L (ref 133–144)
TRIGL SERPL-MCNC: 192 MG/DL

## 2022-01-19 PROCEDURE — 86803 HEPATITIS C AB TEST: CPT | Performed by: INTERNAL MEDICINE

## 2022-01-19 PROCEDURE — 36415 COLL VENOUS BLD VENIPUNCTURE: CPT | Performed by: INTERNAL MEDICINE

## 2022-01-19 PROCEDURE — 80061 LIPID PANEL: CPT | Performed by: INTERNAL MEDICINE

## 2022-01-19 PROCEDURE — 87389 HIV-1 AG W/HIV-1&-2 AB AG IA: CPT | Performed by: INTERNAL MEDICINE

## 2022-01-19 PROCEDURE — 80053 COMPREHEN METABOLIC PANEL: CPT | Performed by: INTERNAL MEDICINE

## 2022-01-19 PROCEDURE — 99396 PREV VISIT EST AGE 40-64: CPT | Performed by: INTERNAL MEDICINE

## 2022-01-19 RX ORDER — PRAVASTATIN SODIUM 20 MG
20 TABLET ORAL DAILY
Qty: 90 TABLET | Refills: 3 | Status: SHIPPED | OUTPATIENT
Start: 2022-01-19 | End: 2023-01-18

## 2022-01-19 RX ORDER — AMLODIPINE BESYLATE 5 MG/1
5 TABLET ORAL DAILY
Qty: 90 TABLET | Refills: 3 | Status: SHIPPED | OUTPATIENT
Start: 2022-01-19 | End: 2023-01-10

## 2022-01-19 SDOH — ECONOMIC STABILITY: INCOME INSECURITY: IN THE LAST 12 MONTHS, WAS THERE A TIME WHEN YOU WERE NOT ABLE TO PAY THE MORTGAGE OR RENT ON TIME?: NO

## 2022-01-19 SDOH — ECONOMIC STABILITY: FOOD INSECURITY: WITHIN THE PAST 12 MONTHS, YOU WORRIED THAT YOUR FOOD WOULD RUN OUT BEFORE YOU GOT MONEY TO BUY MORE.: NEVER TRUE

## 2022-01-19 SDOH — ECONOMIC STABILITY: INCOME INSECURITY: HOW HARD IS IT FOR YOU TO PAY FOR THE VERY BASICS LIKE FOOD, HOUSING, MEDICAL CARE, AND HEATING?: NOT VERY HARD

## 2022-01-19 SDOH — ECONOMIC STABILITY: FOOD INSECURITY: WITHIN THE PAST 12 MONTHS, THE FOOD YOU BOUGHT JUST DIDN'T LAST AND YOU DIDN'T HAVE MONEY TO GET MORE.: NEVER TRUE

## 2022-01-19 SDOH — HEALTH STABILITY: PHYSICAL HEALTH: ON AVERAGE, HOW MANY DAYS PER WEEK DO YOU ENGAGE IN MODERATE TO STRENUOUS EXERCISE (LIKE A BRISK WALK)?: 2 DAYS

## 2022-01-19 SDOH — HEALTH STABILITY: PHYSICAL HEALTH: ON AVERAGE, HOW MANY MINUTES DO YOU ENGAGE IN EXERCISE AT THIS LEVEL?: 30 MIN

## 2022-01-19 ASSESSMENT — SOCIAL DETERMINANTS OF HEALTH (SDOH)
HOW OFTEN DO YOU GET TOGETHER WITH FRIENDS OR RELATIVES?: ONCE A WEEK
DO YOU BELONG TO ANY CLUBS OR ORGANIZATIONS SUCH AS CHURCH GROUPS UNIONS, FRATERNAL OR ATHLETIC GROUPS, OR SCHOOL GROUPS?: NO
IN A TYPICAL WEEK, HOW MANY TIMES DO YOU TALK ON THE PHONE WITH FAMILY, FRIENDS, OR NEIGHBORS?: ONCE A WEEK
ARE YOU MARRIED, WIDOWED, DIVORCED, SEPARATED, NEVER MARRIED, OR LIVING WITH A PARTNER?: PATIENT DECLINED
HOW OFTEN DO YOU ATTEND CHURCH OR RELIGIOUS SERVICES?: 1 TO 4 TIMES PER YEAR

## 2022-01-19 ASSESSMENT — ENCOUNTER SYMPTOMS
DIZZINESS: 0
DIARRHEA: 0
SORE THROAT: 0
HEMATURIA: 0
CONSTIPATION: 0
EYE PAIN: 0
WEAKNESS: 0
MYALGIAS: 0
ABDOMINAL PAIN: 0
FEVER: 0
CHILLS: 0
FREQUENCY: 0
DYSURIA: 0
HEADACHES: 0
HEARTBURN: 1
PALPITATIONS: 0
JOINT SWELLING: 0
NERVOUS/ANXIOUS: 0
HEMATOCHEZIA: 0
SHORTNESS OF BREATH: 0
COUGH: 0
ARTHRALGIAS: 0
PARESTHESIAS: 0

## 2022-01-19 ASSESSMENT — MIFFLIN-ST. JEOR: SCORE: 1913.33

## 2022-01-19 ASSESSMENT — LIFESTYLE VARIABLES
HOW MANY STANDARD DRINKS CONTAINING ALCOHOL DO YOU HAVE ON A TYPICAL DAY: 1 OR 2
HOW OFTEN DO YOU HAVE A DRINK CONTAINING ALCOHOL: 2-3 TIMES A WEEK
HOW OFTEN DO YOU HAVE SIX OR MORE DRINKS ON ONE OCCASION: NEVER

## 2022-01-19 NOTE — PROGRESS NOTES
SUBJECTIVE:   CC: Manny Pitt is an 52 year old male who presents for preventative health visit.       Patient has been advised of split billing requirements and indicates understanding: Yes     Healthy Habits:     Getting at least 3 servings of Calcium per day:  Yes    Bi-annual eye exam:  NO    Dental care twice a year:  Yes    Sleep apnea or symptoms of sleep apnea:  Excessive snoring    Diet:  Regular (no restrictions)    Frequency of exercise:  1 day/week    Duration of exercise:  15-30 minutes    Taking medications regularly:  Yes    Medication side effects:  Muscle aches    PHQ-2 Total Score: 1    Additional concerns today:  Yes        Today's PHQ-2 Score:   PHQ-2 ( 1999 Pfizer) 1/19/2022   Q1: Little interest or pleasure in doing things 1   Q2: Feeling down, depressed or hopeless 0   PHQ-2 Score 1   PHQ-2 Total Score (12-17 Years)- Positive if 3 or more points; Administer PHQ-A if positive -   Q1: Little interest or pleasure in doing things Several days   Q2: Feeling down, depressed or hopeless Not at all   PHQ-2 Score 1       Abuse: Current or Past(Physical, Sexual or Emotional)- No  Do you feel safe in your environment? Yes    Have you ever done Advance Care Planning? (For example, a Health Directive, POLST, or a discussion with a medical provider or your loved ones about your wishes): No, advance care planning information given to patient to review.  Patient plans to discuss their wishes with loved ones or provider.      Social History     Tobacco Use     Smoking status: Never Smoker     Smokeless tobacco: Never Used   Substance Use Topics     Alcohol use: Yes     Comment: 2-3 drinks per week         Alcohol Use 1/19/2022   Prescreen: >3 drinks/day or >7 drinks/week? No   Prescreen: >3 drinks/day or >7 drinks/week? -       Last PSA:   PSA   Date Value Ref Range Status   01/11/2021 2.28 0 - 4 ug/L Final     Comment:     Assay Method:  Chemiluminescence using Siemens Vista analyzer       Reviewed orders  with patient. Reviewed health maintenance and updated orders accordingly - Yes  Patient Active Problem List   Diagnosis     Hyperlipidemia LDL goal <160     Recurrent basal cell carcinoma of skin     Onychomycosis     Hemorrhoids, unspecified hemorrhoid type     Benign prostatic hyperplasia with lower urinary tract symptoms, symptom details unspecified     SCC (squamous cell carcinoma), face     Past Surgical History:   Procedure Laterality Date     COLONOSCOPY  2007     COLONOSCOPY  12/26/2012    Procedure: COLONOSCOPY;  Colonoscopy ;  Surgeon: Ranulfo Ramirez MD;  Location:  GI     COLONOSCOPY N/A 12/20/2016    Procedure: COLONOSCOPY;  Surgeon: Paula Johnson MD;  Location: [facility]     HERNIA REPAIR       ZZC NONSPECIFIC PROCEDURE      Hernia       Social History     Tobacco Use     Smoking status: Never Smoker     Smokeless tobacco: Never Used   Substance Use Topics     Alcohol use: Yes     Comment: 2-3 drinks per week     Family History   Problem Relation Age of Onset     Cancer Maternal Grandmother         basal cell     Cerebrovascular Disease Maternal Grandmother      Cancer Maternal Grandfather         basal cell     Cerebrovascular Disease Paternal Grandmother      Alcohol/Drug Paternal Grandfather      Hypertension Mother      Heart Disease Father         anuerysm     Hypertension Father      Cancer Father         lung     Diabetes No family hx of      Colon Cancer No family hx of          Current Outpatient Medications   Medication Sig Dispense Refill     amLODIPine (NORVASC) 5 MG tablet Take 1 tablet (5 mg) by mouth daily 90 tablet 3     pravastatin (PRAVACHOL) 20 MG tablet Take 1 tablet (20 mg) by mouth daily 90 tablet 3     Acetaminophen (TYLENOL PO)          Reviewed and updated as needed this visit by clinical staff  Tobacco  Allergies    Med Hx  Surg Hx  Fam Hx  Soc Hx       Reviewed and updated as needed this visit by Provider                   Review of Systems   Constitutional:  "Negative for chills and fever.   HENT: Negative for congestion, ear pain, hearing loss and sore throat.    Eyes: Negative for pain and visual disturbance.   Respiratory: Negative for cough and shortness of breath.    Cardiovascular: Negative for chest pain, palpitations and peripheral edema.   Gastrointestinal: Positive for heartburn. Negative for abdominal pain, constipation, diarrhea and hematochezia.        Occ reflux sx   Genitourinary: Negative for dysuria, frequency, genital sores, hematuria, impotence, penile discharge and urgency.   Musculoskeletal: Negative for arthralgias, joint swelling and myalgias.   Skin: Negative for rash.   Neurological: Negative for dizziness, weakness, headaches and paresthesias.   Psychiatric/Behavioral: Negative for mood changes. The patient is not nervous/anxious.        OBJECTIVE:   /88   Pulse 67   Temp 98.1  F (36.7  C) (Tympanic)   Resp 16   Ht 1.867 m (6' 1.5\")   Wt 101.2 kg (223 lb)   SpO2 95%   BMI 29.02 kg/m      Physical Exam  GENERAL: healthy, alert and no distress  EYES: Eyes grossly normal to inspection, PERRL and conjunctivae and sclerae normal  HENT: ear canals and TM's normal, nose and mouth without ulcers or lesions  NECK: no adenopathy, no asymmetry, masses, or scars and thyroid normal to palpation  RESP: lungs clear to auscultation - no rales, rhonchi or wheezes  CV: regular rate and rhythm, normal S1 S2, no S3 or S4, no murmur, click or rub, no peripheral edema and peripheral pulses strong  ABDOMEN: soft, nontender, no hepatosplenomegaly, no masses and bowel sounds normal  MS: no gross musculoskeletal defects noted, no edema  SKIN: no suspicious lesions or rashes  NEURO: Normal strength and tone, mentation intact and speech normal  PSYCH: mentation appears normal, affect normal/bright        ASSESSMENT/PLAN:   (Z00.00) Routine general medical examination at a health care facility  (primary encounter diagnosis)    (I10) Benign essential " "hypertension  Comment:    Well controlled  Plan: amLODIPine (NORVASC) 5 MG tablet          (E78.5) Hyperlipidemia LDL goal <160  Comment:     Lab Results   Component Value Date     01/04/2021   Due for follow-up fasting labwork, continue pravachol  Plan: pravastatin (PRAVACHOL) 20 MG tablet,         Comprehensive metabolic panel (BMP + Alb, Alk         Phos, ALT, AST, Total. Bili, TP), Lipid panel         reflex to direct LDL Fasting         Recurrent basal cell ca.  Follows up with dermatology every 6 months     COUNSELING:   Reviewed preventive health counseling, as reflected in patient instructions       Regular exercise       Healthy diet/nutrition       Colon cancer screening       Prostate cancer screening    Estimated body mass index is 29.02 kg/m  as calculated from the following:    Height as of this encounter: 1.867 m (6' 1.5\").    Weight as of this encounter: 101.2 kg (223 lb).     Weight management plan: Discussed healthy diet and exercise guidelines    He reports that he has never smoked. He has never used smokeless tobacco.      Counseling Resources:  ATP IV Guidelines  Pooled Cohorts Equation Calculator  FRAX Risk Assessment  ICSI Preventive Guidelines  Dietary Guidelines for Americans, 2010  [employer]'s MyPlate  ASA Prophylaxis  Lung CA Screening    Ranulfo Neff MD  [facility]  "

## 2023-01-16 DIAGNOSIS — E78.5 HYPERLIPIDEMIA LDL GOAL <160: ICD-10-CM

## 2023-01-18 RX ORDER — PRAVASTATIN SODIUM 20 MG
TABLET ORAL
Qty: 90 TABLET | Refills: 0 | Status: SHIPPED | OUTPATIENT
Start: 2023-01-18 | End: 2023-01-31

## 2023-01-18 NOTE — TELEPHONE ENCOUNTER
Last AWV & lipids were done on 1/19/22. Has upcoming appointment on 1/31/23. Sent 3 months supply to last till his upcoming appointment.    ELENI Ho  Patient Advocate Liason (PAL)  [facility]  Ph. [phone] / Fax. [phone]

## 2023-01-24 SDOH — ECONOMIC STABILITY: FOOD INSECURITY: WITHIN THE PAST 12 MONTHS, YOU WORRIED THAT YOUR FOOD WOULD RUN OUT BEFORE YOU GOT MONEY TO BUY MORE.: NEVER TRUE

## 2023-01-24 SDOH — ECONOMIC STABILITY: INCOME INSECURITY: HOW HARD IS IT FOR YOU TO PAY FOR THE VERY BASICS LIKE FOOD, HOUSING, MEDICAL CARE, AND HEATING?: NOT HARD AT ALL

## 2023-01-24 SDOH — ECONOMIC STABILITY: INCOME INSECURITY: IN THE LAST 12 MONTHS, WAS THERE A TIME WHEN YOU WERE NOT ABLE TO PAY THE MORTGAGE OR RENT ON TIME?: NO

## 2023-01-24 SDOH — ECONOMIC STABILITY: FOOD INSECURITY: WITHIN THE PAST 12 MONTHS, THE FOOD YOU BOUGHT JUST DIDN'T LAST AND YOU DIDN'T HAVE MONEY TO GET MORE.: NEVER TRUE

## 2023-01-24 SDOH — HEALTH STABILITY: PHYSICAL HEALTH: ON AVERAGE, HOW MANY DAYS PER WEEK DO YOU ENGAGE IN MODERATE TO STRENUOUS EXERCISE (LIKE A BRISK WALK)?: 2 DAYS

## 2023-01-24 SDOH — HEALTH STABILITY: PHYSICAL HEALTH: ON AVERAGE, HOW MANY MINUTES DO YOU ENGAGE IN EXERCISE AT THIS LEVEL?: 30 MIN

## 2023-01-24 ASSESSMENT — ENCOUNTER SYMPTOMS
PALPITATIONS: 0
PARESTHESIAS: 0
SHORTNESS OF BREATH: 0
CONSTIPATION: 0
DIARRHEA: 0
MYALGIAS: 0
JOINT SWELLING: 0
NERVOUS/ANXIOUS: 0
SORE THROAT: 0
NAUSEA: 0
HEMATOCHEZIA: 0
EYE PAIN: 0
HEMATURIA: 0
FEVER: 0
DYSURIA: 0
ABDOMINAL PAIN: 0
CHILLS: 0
ARTHRALGIAS: 0
DIZZINESS: 0
COUGH: 0
WEAKNESS: 0
HEADACHES: 0
HEARTBURN: 1

## 2023-01-24 ASSESSMENT — SOCIAL DETERMINANTS OF HEALTH (SDOH)
DO YOU BELONG TO ANY CLUBS OR ORGANIZATIONS SUCH AS CHURCH GROUPS UNIONS, FRATERNAL OR ATHLETIC GROUPS, OR SCHOOL GROUPS?: NO
HOW OFTEN DO YOU GET TOGETHER WITH FRIENDS OR RELATIVES?: TWICE A WEEK
HOW OFTEN DO YOU ATTEND CHURCH OR RELIGIOUS SERVICES?: 1 TO 4 TIMES PER YEAR
ARE YOU MARRIED, WIDOWED, DIVORCED, SEPARATED, NEVER MARRIED, OR LIVING WITH A PARTNER?: LIVING WITH PARTNER
IN A TYPICAL WEEK, HOW MANY TIMES DO YOU TALK ON THE PHONE WITH FAMILY, FRIENDS, OR NEIGHBORS?: MORE THAN THREE TIMES A WEEK

## 2023-01-24 ASSESSMENT — LIFESTYLE VARIABLES
HOW OFTEN DO YOU HAVE SIX OR MORE DRINKS ON ONE OCCASION: NEVER
HOW MANY STANDARD DRINKS CONTAINING ALCOHOL DO YOU HAVE ON A TYPICAL DAY: 1 OR 2
HOW OFTEN DO YOU HAVE A DRINK CONTAINING ALCOHOL: 2-4 TIMES A MONTH
AUDIT-C TOTAL SCORE: 2
SKIP TO QUESTIONS 9-10: 1

## 2023-01-31 ENCOUNTER — OFFICE VISIT (OUTPATIENT)
Dept: PEDIATRICS | Facility: CLINIC | Age: 53
End: 2023-01-31
Payer: COMMERCIAL

## 2023-01-31 VITALS
OXYGEN SATURATION: 97 % | HEIGHT: 74 IN | BODY MASS INDEX: 28.62 KG/M2 | TEMPERATURE: 97.4 F | SYSTOLIC BLOOD PRESSURE: 118 MMHG | DIASTOLIC BLOOD PRESSURE: 70 MMHG | RESPIRATION RATE: 16 BRPM | HEART RATE: 70 BPM | WEIGHT: 223 LBS

## 2023-01-31 DIAGNOSIS — E78.5 HYPERLIPIDEMIA LDL GOAL <160: ICD-10-CM

## 2023-01-31 DIAGNOSIS — Z12.5 SCREENING FOR PROSTATE CANCER: ICD-10-CM

## 2023-01-31 DIAGNOSIS — I10 BENIGN ESSENTIAL HYPERTENSION: ICD-10-CM

## 2023-01-31 DIAGNOSIS — Z87.898 H/O MOTION SICKNESS: ICD-10-CM

## 2023-01-31 DIAGNOSIS — C44.91: ICD-10-CM

## 2023-01-31 DIAGNOSIS — Z00.00 ROUTINE GENERAL MEDICAL EXAMINATION AT A HEALTH CARE FACILITY: Primary | ICD-10-CM

## 2023-01-31 LAB
ALBUMIN SERPL BCG-MCNC: 4.4 G/DL (ref 3.5–5.2)
ALP SERPL-CCNC: 71 U/L (ref 40–129)
ALT SERPL W P-5'-P-CCNC: 27 U/L (ref 10–50)
ANION GAP SERPL CALCULATED.3IONS-SCNC: 15 MMOL/L (ref 7–15)
AST SERPL W P-5'-P-CCNC: 22 U/L (ref 10–50)
BILIRUB SERPL-MCNC: 0.5 MG/DL
BUN SERPL-MCNC: 22.3 MG/DL (ref 6–20)
CALCIUM SERPL-MCNC: 9.6 MG/DL (ref 8.6–10)
CHLORIDE SERPL-SCNC: 106 MMOL/L (ref 98–107)
CHOLEST SERPL-MCNC: 195 MG/DL
CREAT SERPL-MCNC: 1.14 MG/DL (ref 0.67–1.17)
DEPRECATED HCO3 PLAS-SCNC: 21 MMOL/L (ref 22–29)
GFR SERPL CREATININE-BSD FRML MDRD: 76 ML/MIN/1.73M2
GLUCOSE SERPL-MCNC: 90 MG/DL (ref 70–99)
HDLC SERPL-MCNC: 41 MG/DL
LDLC SERPL CALC-MCNC: 121 MG/DL
NONHDLC SERPL-MCNC: 154 MG/DL
POTASSIUM SERPL-SCNC: 4.5 MMOL/L (ref 3.4–5.3)
PROT SERPL-MCNC: 7.4 G/DL (ref 6.4–8.3)
PSA SERPL-MCNC: 1.9 NG/ML (ref 0–3.5)
SODIUM SERPL-SCNC: 142 MMOL/L (ref 136–145)
TRIGL SERPL-MCNC: 164 MG/DL

## 2023-01-31 PROCEDURE — G0103 PSA SCREENING: HCPCS | Performed by: INTERNAL MEDICINE

## 2023-01-31 PROCEDURE — 36415 COLL VENOUS BLD VENIPUNCTURE: CPT | Performed by: INTERNAL MEDICINE

## 2023-01-31 PROCEDURE — 80061 LIPID PANEL: CPT | Performed by: INTERNAL MEDICINE

## 2023-01-31 PROCEDURE — 80053 COMPREHEN METABOLIC PANEL: CPT | Performed by: INTERNAL MEDICINE

## 2023-01-31 PROCEDURE — 99213 OFFICE O/P EST LOW 20 MIN: CPT | Mod: 25 | Performed by: INTERNAL MEDICINE

## 2023-01-31 PROCEDURE — 99396 PREV VISIT EST AGE 40-64: CPT | Performed by: INTERNAL MEDICINE

## 2023-01-31 RX ORDER — PRAVASTATIN SODIUM 20 MG
20 TABLET ORAL DAILY
Qty: 90 TABLET | Refills: 3 | Status: SHIPPED | OUTPATIENT
Start: 2023-01-31 | End: 2024-03-19

## 2023-01-31 RX ORDER — AMLODIPINE BESYLATE 5 MG/1
5 TABLET ORAL DAILY
Qty: 90 TABLET | Refills: 3 | Status: SHIPPED | OUTPATIENT
Start: 2023-01-31 | End: 2024-03-19

## 2023-01-31 RX ORDER — MECLIZINE HYDROCHLORIDE 25 MG/1
25 TABLET ORAL 3 TIMES DAILY PRN
Qty: 90 TABLET | Refills: 3 | Status: SHIPPED | OUTPATIENT
Start: 2023-01-31

## 2023-01-31 ASSESSMENT — ENCOUNTER SYMPTOMS
JOINT SWELLING: 0
NAUSEA: 0
CONSTIPATION: 0
DIARRHEA: 0
HEMATOCHEZIA: 0
NERVOUS/ANXIOUS: 0
HEARTBURN: 1
PARESTHESIAS: 0
PALPITATIONS: 0
MYALGIAS: 0
FEVER: 0
HEADACHES: 0
EYE PAIN: 0
COUGH: 0
HEMATURIA: 0
DIZZINESS: 0
DYSURIA: 0
ARTHRALGIAS: 0
SORE THROAT: 0
WEAKNESS: 0
ABDOMINAL PAIN: 0
SHORTNESS OF BREATH: 0
CHILLS: 0

## 2023-01-31 ASSESSMENT — PAIN SCALES - GENERAL: PAINLEVEL: NO PAIN (0)

## 2023-01-31 NOTE — PROGRESS NOTES
SUBJECTIVE:   CC: Nic is an 53 year old who presents for preventative health visit.   Healthy Habits:     Getting at least 3 servings of Calcium per day:  Yes    Bi-annual eye exam:  Yes    Dental care twice a year:  Yes    Sleep apnea or symptoms of sleep apnea:  Excessive snoring    Diet:  Regular (no restrictions)    Frequency of exercise:  1 day/week    Duration of exercise:  30-45 minutes    Taking medications regularly:  Yes    PHQ-2 Total Score: 1    Additional concerns today:  Yes        Today's PHQ-2 Score:   PHQ-2 ( 1999 Pfizer) 1/24/2023   Q1: Little interest or pleasure in doing things 1   Q2: Feeling down, depressed or hopeless 0   PHQ-2 Score 1   PHQ-2 Total Score (12-17 Years)- Positive if 3 or more points; Administer PHQ-A if positive -   Q1: Little interest or pleasure in doing things Several days   Q2: Feeling down, depressed or hopeless Not at all   PHQ-2 Score 1           Social History     Tobacco Use     Smoking status: Never     Smokeless tobacco: Never   Substance Use Topics     Alcohol use: Yes     Comment: 2-3 drinks per week     If you drink alcohol do you typically have >3 drinks per day or >7 drinks per week? No    Alcohol Use 1/31/2023   Prescreen: >3 drinks/day or >7 drinks/week? -   Prescreen: >3 drinks/day or >7 drinks/week? No       Last PSA:   PSA   Date Value Ref Range Status   01/11/2021 2.28 0 - 4 ug/L Final     Comment:     Assay Method:  Chemiluminescence using Siemens Vista analyzer       Reviewed orders with patient. Reviewed health maintenance and updated orders accordingly - Yes      Reviewed and updated as needed this visit by clinical staff   Tobacco  Allergies  Meds              Reviewed and updated as needed this visit by Provider                   Patient Active Problem List   Diagnosis     Hyperlipidemia LDL goal <160     Recurrent basal cell carcinoma of skin     Onychomycosis     Hemorrhoids, unspecified hemorrhoid type     Benign prostatic hyperplasia with  "lower urinary tract symptoms, symptom details unspecified     SCC (squamous cell carcinoma), face     Current Outpatient Medications   Medication Sig Dispense Refill     Acetaminophen (TYLENOL PO)        amLODIPine (NORVASC) 5 MG tablet Take 1 tablet (5 mg) by mouth daily 90 tablet 3     meclizine (ANTIVERT) 25 MG tablet Take 1 tablet (25 mg) by mouth 3 times daily as needed (motion sickness) 90 tablet 3     pravastatin (PRAVACHOL) 20 MG tablet Take 1 tablet (20 mg) by mouth daily 90 tablet 3        Review of Systems   Constitutional: Negative for chills and fever.   HENT: Negative for congestion, ear pain, hearing loss and sore throat.    Eyes: Negative for pain and visual disturbance.   Respiratory: Negative for cough and shortness of breath.    Cardiovascular: Negative for chest pain, palpitations and peripheral edema.   Gastrointestinal: Positive for heartburn. Negative for abdominal pain, constipation, diarrhea, hematochezia and nausea.   Genitourinary: Positive for urgency. Negative for dysuria, genital sores, hematuria and impotence.   Musculoskeletal: Negative for arthralgias, joint swelling and myalgias.   Skin: Negative for rash.   Neurological: Negative for dizziness, weakness, headaches and paresthesias.   Psychiatric/Behavioral: Negative for mood changes. The patient is not nervous/anxious.          OBJECTIVE:   /70 (Cuff Size: Adult Regular)   Pulse 70   Temp 97.4  F (36.3  C) (Tympanic)   Resp 16   Ht 1.867 m (6' 1.5\")   Wt 101.2 kg (223 lb)   SpO2 97%   BMI 29.02 kg/m      Physical Exam  GENERAL: healthy, alert and no distress  EYES: Eyes grossly normal to inspection, PERRL and conjunctivae and sclerae normal  HENT: ear canals and TM's normal, nose and mouth without ulcers or lesions  NECK: no adenopathy, no asymmetry, masses, or scars and thyroid normal to palpation  RESP: lungs clear to auscultation - no rales, rhonchi or wheezes  CV: regular rate and rhythm, normal S1 S2, no S3 or S4, " no murmur, click or rub, no peripheral edema and peripheral pulses strong  ABDOMEN: soft, nontender, no hepatosplenomegaly, no masses and bowel sounds normal  MS: no gross musculoskeletal defects noted, no edema  SKIN: no suspicious lesions or rashes  NEURO: Normal strength and tone, mentation intact and speech normal  PSYCH: mentation appears normal, affect normal/bright      ASSESSMENT/PLAN:   (Z00.00) Routine general medical examination at a health care facility  (primary encounter diagnosis)    (I10) Benign essential hypertension  Comment: Adequate control.  Continue current medication.   Plan: amLODIPine (NORVASC) 5 MG tablet          (E78.5) Hyperlipidemia LDL goal <160  Comment:   Lab Results   Component Value Date     01/19/2022     01/04/2021   Plan: Lipid panel reflex to direct LDL Non-fasting,         pravastatin (PRAVACHOL) 20 MG tablet,         Comprehensive metabolic panel (BMP + Alb, Alk         Phos, ALT, AST, Total. Bili, TP)          (C44.91) Recurrent basal cell carcinoma of skin  Comment:   Plan: dermatology follow-up q6mo    (Z87.898) H/O motion sickness  Comment: refill  Plan: meclizine (ANTIVERT) 25 MG tablet          (Z12.5) Screening for prostate cancer  Comment:   Plan: PSA, screen              COUNSELING:   Reviewed preventive health counseling, as reflected in patient instructions       Regular exercise       Healthy diet/nutrition       Colorectal cancer screening       Prostate cancer screening        He reports that he has never smoked. He has never used smokeless tobacco.            Ranulfo Neff MD  [facility]

## 2023-11-03 ENCOUNTER — HOSPITAL ENCOUNTER (EMERGENCY)
Facility: CLINIC | Age: 53
Discharge: HOME | End: 2023-11-03
Attending: EMERGENCY MEDICINE | Admitting: EMERGENCY MEDICINE
Payer: COMMERCIAL

## 2023-11-03 VITALS
OXYGEN SATURATION: 100 % | WEIGHT: 222.44 LBS | DIASTOLIC BLOOD PRESSURE: 89 MMHG | BODY MASS INDEX: 29.48 KG/M2 | RESPIRATION RATE: 18 BRPM | SYSTOLIC BLOOD PRESSURE: 145 MMHG | HEIGHT: 73 IN | TEMPERATURE: 98.4 F | HEART RATE: 75 BPM

## 2023-11-03 DIAGNOSIS — R25.2 MUSCLE CRAMPS: ICD-10-CM

## 2023-11-03 DIAGNOSIS — R20.2 PARESTHESIA: ICD-10-CM

## 2023-11-03 DIAGNOSIS — T50.Z95A ADVERSE EFFECT OF VACCINE, INITIAL ENCOUNTER: ICD-10-CM

## 2023-11-03 LAB
ALBUMIN UR-MCNC: NEGATIVE MG/DL
ANION GAP SERPL CALCULATED.3IONS-SCNC: 12 MMOL/L (ref 7–15)
APPEARANCE UR: CLEAR
ATRIAL RATE - MUSE: 78 BPM
BASOPHILS # BLD AUTO: 0.1 10E3/UL (ref 0–0.2)
BASOPHILS NFR BLD AUTO: 1 %
BILIRUB UR QL STRIP: NEGATIVE
BUN SERPL-MCNC: 19.2 MG/DL (ref 6–20)
CALCIUM SERPL-MCNC: 9.6 MG/DL (ref 8.6–10)
CHLORIDE SERPL-SCNC: 102 MMOL/L (ref 98–107)
CK SERPL-CCNC: 94 U/L (ref 39–308)
COLOR UR AUTO: NORMAL
CREAT SERPL-MCNC: 0.92 MG/DL (ref 0.67–1.17)
DEPRECATED HCO3 PLAS-SCNC: 22 MMOL/L (ref 22–29)
DIASTOLIC BLOOD PRESSURE - MUSE: NORMAL MMHG
EGFRCR SERPLBLD CKD-EPI 2021: >90 ML/MIN/1.73M2
EOSINOPHIL # BLD AUTO: 0.1 10E3/UL (ref 0–0.7)
EOSINOPHIL NFR BLD AUTO: 2 %
ERYTHROCYTE [DISTWIDTH] IN BLOOD BY AUTOMATED COUNT: 12.4 % (ref 10–15)
GLUCOSE SERPL-MCNC: 111 MG/DL (ref 70–99)
GLUCOSE UR STRIP-MCNC: NEGATIVE MG/DL
HCT VFR BLD AUTO: 52.7 % (ref 40–53)
HGB BLD-MCNC: 17.8 G/DL (ref 13.3–17.7)
HGB UR QL STRIP: NEGATIVE
IMM GRANULOCYTES # BLD: 0 10E3/UL
IMM GRANULOCYTES NFR BLD: 0 %
INTERPRETATION ECG - MUSE: NORMAL
KETONES UR STRIP-MCNC: NEGATIVE MG/DL
LEUKOCYTE ESTERASE UR QL STRIP: NEGATIVE
LYMPHOCYTES # BLD AUTO: 0.4 10E3/UL (ref 0.8–5.3)
LYMPHOCYTES NFR BLD AUTO: 5 %
MCH RBC QN AUTO: 29.7 PG (ref 26.5–33)
MCHC RBC AUTO-ENTMCNC: 33.8 G/DL (ref 31.5–36.5)
MCV RBC AUTO: 88 FL (ref 78–100)
MONOCYTES # BLD AUTO: 0.5 10E3/UL (ref 0–1.3)
MONOCYTES NFR BLD AUTO: 7 %
NEUTROPHILS # BLD AUTO: 6.4 10E3/UL (ref 1.6–8.3)
NEUTROPHILS NFR BLD AUTO: 85 %
NITRATE UR QL: NEGATIVE
NRBC # BLD AUTO: 0 10E3/UL
NRBC BLD AUTO-RTO: 0 /100
P AXIS - MUSE: 15 DEGREES
PH UR STRIP: 5.5 [PH] (ref 5–7)
PLATELET # BLD AUTO: 229 10E3/UL (ref 150–450)
POTASSIUM SERPL-SCNC: 4.2 MMOL/L (ref 3.4–5.3)
PR INTERVAL - MUSE: 168 MS
QRS DURATION - MUSE: 110 MS
QT - MUSE: 402 MS
QTC - MUSE: 458 MS
R AXIS - MUSE: -30 DEGREES
RBC # BLD AUTO: 5.99 10E6/UL (ref 4.4–5.9)
RBC URINE: <1 /HPF
SODIUM SERPL-SCNC: 136 MMOL/L (ref 135–145)
SP GR UR STRIP: 1.01 (ref 1–1.03)
SYSTOLIC BLOOD PRESSURE - MUSE: NORMAL MMHG
T AXIS - MUSE: -3 DEGREES
UROBILINOGEN UR STRIP-MCNC: NORMAL MG/DL
VENTRICULAR RATE- MUSE: 78 BPM
WBC # BLD AUTO: 7.4 10E3/UL (ref 4–11)
WBC URINE: 1 /HPF

## 2023-11-03 PROCEDURE — 36415 COLL VENOUS BLD VENIPUNCTURE: CPT | Performed by: EMERGENCY MEDICINE

## 2023-11-03 PROCEDURE — 99284 EMERGENCY DEPT VISIT MOD MDM: CPT

## 2023-11-03 PROCEDURE — 80048 BASIC METABOLIC PNL TOTAL CA: CPT | Performed by: EMERGENCY MEDICINE

## 2023-11-03 PROCEDURE — 81001 URINALYSIS AUTO W/SCOPE: CPT | Performed by: EMERGENCY MEDICINE

## 2023-11-03 PROCEDURE — 93005 ELECTROCARDIOGRAM TRACING: CPT

## 2023-11-03 PROCEDURE — 82550 ASSAY OF CK (CPK): CPT | Performed by: EMERGENCY MEDICINE

## 2023-11-03 PROCEDURE — 85025 COMPLETE CBC W/AUTO DIFF WBC: CPT | Performed by: EMERGENCY MEDICINE

## 2023-11-03 RX ORDER — KETOROLAC TROMETHAMINE 15 MG/ML
15 INJECTION, SOLUTION INTRAMUSCULAR; INTRAVENOUS ONCE
Status: COMPLETED | OUTPATIENT
Start: 2023-11-03 | End: 2023-11-03

## 2023-11-03 ASSESSMENT — ACTIVITIES OF DAILY LIVING (ADL): ADLS_ACUITY_SCORE: 35

## 2023-11-03 NOTE — ED PROVIDER NOTES
"  History     Chief Complaint:  Numbness     The history is provided by the patient.      Manny Pitt is a 53 year old male with a history of GERD and hyperlipidemia who presents to the emergency department for numbness. The patient states that since his flu shot at 1600 was administered, he has been experiencing numbness in his bilateral toes that radiates up to the posterior aspect of his knee. He reports that he is also experiencing back pain and right-sided abdominal pain. He notes that he is urinating frequently but reports that this is not new for him as he typically urinates every hour at baseline. He adds that he called the nurse triage number in which they told him to present to the emergency department if his numbness spread from his toes. Denies any numbness in his upper extremities. He notes that he took Tylenol with no alleviation of his symptoms. Denies any past reactions to flu shots. Denies weakness. Denies vision changes. Denies any respiratory concerns.     Independent Historian:   None - Patient Only    Review of External Notes:        Medications:    Amlodipine  Meclizine  Pravastatin    Past Medical History:    GERD  Hernia  Malignant neoplasm of skin  Eustachian tube disorder  Hyperlipidemia   Onychomycosis  Hemorrhoids    Past Surgical History:    Hernia repair     Physical Exam   Patient Vitals for the past 24 hrs:   BP Temp Temp src Pulse Resp SpO2 Height Weight   11/03/23 0412 -- -- -- -- -- 96 % -- --   11/03/23 0411 (!) 153/120 -- -- 91 -- -- -- --   11/03/23 0208 (!) 136/100 98.4  F (36.9  C) Oral 85 18 97 % 1.854 m (6' 1\") 100.9 kg (222 lb 7.1 oz)     Physical Exam    HENT:  external ears unremarkable, Nares clear bilaterally, mmm, oropharynx without tonsillar hypertrophy/erythema/exudate    Eyes: PERRL, [id number] 4mm bilaterally, EOMI, visual acuity and fields intact, conjunctiva and lids normal    Neck: supple, painless ROM, no cervical lymphadenopathy    Lungs:  CTAB,  no resp " distress    CV: rrr, no m/r/g, ppi    Abd: soft, nontender, nondistended, no rebound/masses/guarding/hsm    Ext: no peripheral edema, negative Homans' sign, no palpable abnormality, no significant reproducible pain.    Skin: warm, dry, well perused, no rashes/bruising/lesions on exposed skin    Neuro:   alert, follows commands, speech clear, CN 2-12 intact,   strength 5/5 and symmetric in BUE intrinsic hand muscles as well as BLE  SILT in all 4 ext    cerebellar testing unremarkable  gait stable    Psych: Normal mood, normal affect      Emergency Department Course   ECG  ECG taken at 0318, ECG read at 0318  Normal sinus rhythm  Left axis deviation  Minimal voltage criteria for LVH, may be normal variant   No previous ECG available.  Rate 78 bpm. OH interval 168 ms. QRS duration 110 ms. QT/QTc 402/458 ms. P-R-T axes 15 -30 -3.     Laboratory:  Labs Ordered and Resulted from Time of ED Arrival to Time of ED Departure   BASIC METABOLIC PANEL - Abnormal       Result Value    Sodium 136      Potassium 4.2      Chloride 102      Carbon Dioxide (CO2) 22      Anion Gap 12      Urea Nitrogen 19.2      Creatinine 0.92      GFR Estimate >90      Calcium 9.6      Glucose 111 (*)    CBC WITH PLATELETS AND DIFFERENTIAL - Abnormal    WBC Count 7.4      RBC Count 5.99 (*)     Hemoglobin 17.8 (*)     Hematocrit 52.7      MCV 88      MCH 29.7      MCHC 33.8      RDW 12.4      Platelet Count 229      % Neutrophils 85      % Lymphocytes 5      % Monocytes 7      % Eosinophils 2      % Basophils 1      % Immature Granulocytes 0      NRBCs per 100 WBC 0      Absolute Neutrophils 6.4      Absolute Lymphocytes 0.4 (*)     Absolute Monocytes 0.5      Absolute Eosinophils 0.1      Absolute Basophils 0.1      Absolute Immature Granulocytes 0.0      Absolute NRBCs 0.0     CK TOTAL - Normal    CK 94     ROUTINE UA WITH MICROSCOPIC - Normal    Color Urine Straw      Appearance Urine Clear      Glucose Urine Negative      Bilirubin Urine Negative       Ketones Urine Negative      Specific Gravity Urine 1.008      Blood Urine Negative      pH Urine 5.5      Protein Albumin Urine Negative      Urobilinogen Urine Normal      Nitrite Urine Negative      Leukocyte Esterase Urine Negative      RBC Urine <1      WBC Urine 1        Emergency Department Course & Assessments:  ED Course as of 236      0415 GFR Estimate: >90     Interventions:  Medications   ketorolac (TORADOL) injection 15 mg (has no administration in time range)      Assessments:  226 I obtained history and examined the patient as noted above.   025 I rechecked the patient in which he experienced a vagal response to his IV.  040 I discussed findings and discharge with the patient. All questions answered.     Independent Interpretation (X-rays, CTs, rhythm strip):  None    Consultations/Discussion of Management or Tests:  None     Social Determinants of Health affecting care:   None    Disposition:  The patient was discharged to home.     Impression & Plan      Medical Decision Makin-year-old here with paresthesias and myalgias after a flu vaccination earlier today.  From what he describes paresthesias and soreness started very quickly after the vaccination.  No hives difficulty breathing intraoral swelling suggesting anaphylactoid type reaction.  No neurologic abnormalities on exam here.  EKG and basic blood tests unremarkable.  He did have a vasovagal event during his IV insertion.  He was observed given a liter of fluids was able to pass an ambulation trial and p.o. challenge.  Etiology of his vaccine reaction in terms of the muscle soreness and paresthesias unclear low suspicion for significant CNS etiology such as acute ischemic stroke, intracranial hemorrhage, demyelinating process, Guillain-Barré etc.  Discharge home and follow clinically.    Diagnosis:    ICD-10-CM    1. Paresthesia  R20.2       2. Muscle cramps  R25.2       3. Adverse effect of vaccine,  initial encounter  T50.Z95A          Scribe Disclosure:  I, Yuriy Bolivar, am serving as a scribe at 2:20 AM on 11/3/2023 to document services personally performed by Isreal Munguia MD based on my observations and the provider's statements to me.     11/3/2023   Isreal Munguia MD Walker, Jerome Richard, MD  11/03/23 0844

## 2023-11-03 NOTE — ED NOTES
Patient walked in halls on RA and O2 sats remained 94-96%. No dizziness or nausea reported. No numbness reported.

## 2023-11-03 NOTE — ED TRIAGE NOTES
"Flu shot 11/2 - almost \"passed out\". Got up, instant numbness in toes, pain in back and stomach. Went back to [facility] and talked to pharmacist - called [employer] nurse line and said that numbness in toes is fine. Now numbness in toes goes to back of knee on both legs. \"I seem to be urinating constantly\".        "

## 2023-11-07 ENCOUNTER — OFFICE VISIT (OUTPATIENT)
Dept: PEDIATRICS | Facility: CLINIC | Age: 53
End: 2023-11-07
Payer: COMMERCIAL

## 2023-11-07 VITALS
HEART RATE: 76 BPM | BODY MASS INDEX: 29.29 KG/M2 | TEMPERATURE: 97.1 F | WEIGHT: 222 LBS | OXYGEN SATURATION: 97 % | DIASTOLIC BLOOD PRESSURE: 60 MMHG | SYSTOLIC BLOOD PRESSURE: 120 MMHG

## 2023-11-07 DIAGNOSIS — T50.B95A ADVERSE EFFECT OF INFLUENZA VACCINE, INITIAL ENCOUNTER: Primary | ICD-10-CM

## 2023-11-07 PROCEDURE — 99213 OFFICE O/P EST LOW 20 MIN: CPT | Performed by: PHYSICIAN ASSISTANT

## 2023-11-07 NOTE — PROGRESS NOTES
Assessment & Plan   Adverse effect of influenza vaccine, initial encounter  No flu shots in the future. Symptoms resolved.   Reviewed labs and EKG. Patient scheduled for px in Feb.     DOUGLAS [facility] KAM Lucero is a 53 year old, presenting for the following health issues:  Hospital F/U  Last week, patient had a flu shot and felt abdominal pain, numbness and tingling in the toes. Patient sent to ED with adverse effect of vaccines.     Review of Systems   Constitutional, HEENT, cardiovascular, pulmonary, gi and gu systems are negative, except as otherwise noted.      Objective    /60 (BP Location: Right arm, Patient Position: Sitting, Cuff Size: Adult Large)   Pulse 76   Temp 97.1  F (36.2  C) (Tympanic)   Wt 100.7 kg (222 lb)   SpO2 97%   BMI 29.29 kg/m    Body mass index is 29.29 kg/m .  Physical Exam   GENERAL: alert and no distress

## 2023-12-20 ENCOUNTER — NURSE TRIAGE (OUTPATIENT)
Dept: PEDIATRICS | Facility: CLINIC | Age: 53
End: 2023-12-20
Payer: COMMERCIAL

## 2023-12-20 NOTE — TELEPHONE ENCOUNTER
S-(situation): Patient calling regarding finger injury.     B-(background): Piece of wicket basket went between finger and fingernail on pinky finger, right hand. Patient took out small piece of mukund from finger. Bleeding was minimal. Patient placed antibiotic on the area.     A-(assessment): no current bleeding. Patient states finger has mild pain, improving. No other symptoms.     R-(recommendations): Patient inquiring about most recent tetanus vaccine. Last tetanus was in 2018. RN advised home care measures and continue to monitor at home.     Reason for Disposition   Minor finger injury    Additional Information   Negative: Major bleeding (actively dripping or spurting) that can't be stopped   Negative: Sounds like a life-threatening emergency to the triager   Negative: Wound looks infected   Negative: Caused by animal bite   Negative: Amputation   Negative: High-pressure injection injury (e.g., from paint gun, usually work-related)   Negative: Looks like a broken bone or dislocated joint (e.g., crooked or deformed)   Negative: Skin is split open or gaping (length > 1/2 inch or 12 mm)   Negative: Cut or scrape is very deep (e.g., can see bone or tendons)   Negative: Bleeding won't stop after 10 minutes of direct pressure (using correct technique)   Negative: Dirt in the wound and not removed after 15 minutes of scrubbing   Negative: Cut with numbness (loss of sensation) of finger   Negative: Fingernail is partially torn from a crush injury  (Exception: Torn nail from catching it on something.)   Negative: Sounds like a serious injury to the triager   Negative: Looks infected (e.g., spreading redness, red streak, pus)   Negative: Fingernail is completely torn off   Negative: Base of fingernail has popped out from under skin fold (cuticle)   Negative: Injury and pain has not improved after 3 days   Negative: Injury is still painful or swollen after 2 weeks   Negative: SEVERE pain (e.g., excruciating)   Negative:  MODERATE-SEVERE pain and blood present under the nail (usually > 50% of nail bed)   Negative: Finger joint can't be opened (straightened) or closed (bent) completely   Negative: No prior tetanus shots (or is not fully vaccinated) and any wound (e.g., cut or scrape)   Negative: HIV positive or severe immunodeficiency (severely weak immune system) and DIRTY cut or scrape   Negative: Patient wants to be seen   Negative: Injury interferes with work or school   Negative: Last tetanus shot > 5 years ago and DIRTY cut or scrape   Negative: Last tetanus shot > 10 years ago and CLEAN cut or scrape   Negative: Suspicious history for the injury    Protocols used: Finger Injury-HUBER-ANTONIO MIMS RN 12/20/2023 at 4:35 PM

## 2024-03-12 SDOH — HEALTH STABILITY: PHYSICAL HEALTH: ON AVERAGE, HOW MANY MINUTES DO YOU ENGAGE IN EXERCISE AT THIS LEVEL?: 30 MIN

## 2024-03-12 SDOH — HEALTH STABILITY: PHYSICAL HEALTH: ON AVERAGE, HOW MANY DAYS PER WEEK DO YOU ENGAGE IN MODERATE TO STRENUOUS EXERCISE (LIKE A BRISK WALK)?: 3 DAYS

## 2024-03-12 ASSESSMENT — SOCIAL DETERMINANTS OF HEALTH (SDOH): HOW OFTEN DO YOU GET TOGETHER WITH FRIENDS OR RELATIVES?: TWICE A WEEK

## 2024-03-19 ENCOUNTER — OFFICE VISIT (OUTPATIENT)
Dept: PEDIATRICS | Facility: CLINIC | Age: 54
End: 2024-03-19
Payer: COMMERCIAL

## 2024-03-19 VITALS
SYSTOLIC BLOOD PRESSURE: 132 MMHG | HEART RATE: 71 BPM | BODY MASS INDEX: 28.79 KG/M2 | DIASTOLIC BLOOD PRESSURE: 80 MMHG | OXYGEN SATURATION: 96 % | TEMPERATURE: 97.2 F | RESPIRATION RATE: 16 BRPM | HEIGHT: 73 IN | WEIGHT: 217.2 LBS

## 2024-03-19 DIAGNOSIS — E78.5 HYPERLIPIDEMIA LDL GOAL <160: ICD-10-CM

## 2024-03-19 DIAGNOSIS — C44.91: ICD-10-CM

## 2024-03-19 DIAGNOSIS — I10 ESSENTIAL HYPERTENSION: ICD-10-CM

## 2024-03-19 DIAGNOSIS — Z12.5 SCREENING FOR PROSTATE CANCER: ICD-10-CM

## 2024-03-19 DIAGNOSIS — R35.0 URINARY FREQUENCY: ICD-10-CM

## 2024-03-19 DIAGNOSIS — Z00.00 ROUTINE GENERAL MEDICAL EXAMINATION AT A HEALTH CARE FACILITY: Primary | ICD-10-CM

## 2024-03-19 DIAGNOSIS — R68.89 ABNORMAL CLINICAL FINDING: ICD-10-CM

## 2024-03-19 DIAGNOSIS — G47.30 SLEEP APNEA, UNSPECIFIED TYPE: ICD-10-CM

## 2024-03-19 LAB
ALBUMIN SERPL BCG-MCNC: 4.4 G/DL (ref 3.5–5.2)
ALBUMIN UR-MCNC: NEGATIVE MG/DL
ALP SERPL-CCNC: 75 U/L (ref 40–150)
ALT SERPL W P-5'-P-CCNC: 26 U/L (ref 0–70)
ANION GAP SERPL CALCULATED.3IONS-SCNC: 12 MMOL/L (ref 7–15)
APPEARANCE UR: CLEAR
AST SERPL W P-5'-P-CCNC: 21 U/L (ref 0–45)
BASOPHILS # BLD AUTO: 0 10E3/UL (ref 0–0.2)
BASOPHILS NFR BLD AUTO: 0 %
BILIRUB SERPL-MCNC: 0.7 MG/DL
BILIRUB UR QL STRIP: NEGATIVE
BUN SERPL-MCNC: 17.4 MG/DL (ref 6–20)
CALCIUM SERPL-MCNC: 9.4 MG/DL (ref 8.6–10)
CHLORIDE SERPL-SCNC: 105 MMOL/L (ref 98–107)
CHOLEST SERPL-MCNC: 196 MG/DL
COLOR UR AUTO: YELLOW
CREAT SERPL-MCNC: 1.11 MG/DL (ref 0.67–1.17)
DEPRECATED HCO3 PLAS-SCNC: 24 MMOL/L (ref 22–29)
EGFRCR SERPLBLD CKD-EPI 2021: 78 ML/MIN/1.73M2
EOSINOPHIL # BLD AUTO: 0.1 10E3/UL (ref 0–0.7)
EOSINOPHIL NFR BLD AUTO: 1 %
ERYTHROCYTE [DISTWIDTH] IN BLOOD BY AUTOMATED COUNT: 12.7 % (ref 10–15)
FASTING STATUS PATIENT QL REPORTED: ABNORMAL
GLUCOSE SERPL-MCNC: 98 MG/DL (ref 70–99)
GLUCOSE UR STRIP-MCNC: NEGATIVE MG/DL
HCT VFR BLD AUTO: 51.6 % (ref 40–53)
HDLC SERPL-MCNC: 41 MG/DL
HGB BLD-MCNC: 17.1 G/DL (ref 13.3–17.7)
HGB UR QL STRIP: ABNORMAL
IMM GRANULOCYTES # BLD: 0 10E3/UL
IMM GRANULOCYTES NFR BLD: 0 %
KETONES UR STRIP-MCNC: NEGATIVE MG/DL
LDLC SERPL CALC-MCNC: 117 MG/DL
LEUKOCYTE ESTERASE UR QL STRIP: NEGATIVE
LYMPHOCYTES # BLD AUTO: 1.6 10E3/UL (ref 0.8–5.3)
LYMPHOCYTES NFR BLD AUTO: 26 %
MCH RBC QN AUTO: 29.1 PG (ref 26.5–33)
MCHC RBC AUTO-ENTMCNC: 33.1 G/DL (ref 31.5–36.5)
MCV RBC AUTO: 88 FL (ref 78–100)
MONOCYTES # BLD AUTO: 0.4 10E3/UL (ref 0–1.3)
MONOCYTES NFR BLD AUTO: 6 %
NEUTROPHILS # BLD AUTO: 4.2 10E3/UL (ref 1.6–8.3)
NEUTROPHILS NFR BLD AUTO: 67 %
NITRATE UR QL: NEGATIVE
NONHDLC SERPL-MCNC: 155 MG/DL
PH UR STRIP: 5 [PH] (ref 5–7)
PLATELET # BLD AUTO: 255 10E3/UL (ref 150–450)
POTASSIUM SERPL-SCNC: 4.4 MMOL/L (ref 3.4–5.3)
PROT SERPL-MCNC: 7.5 G/DL (ref 6.4–8.3)
PSA SERPL DL<=0.01 NG/ML-MCNC: 2.05 NG/ML (ref 0–3.5)
RBC # BLD AUTO: 5.87 10E6/UL (ref 4.4–5.9)
RBC #/AREA URNS AUTO: NORMAL /HPF
SODIUM SERPL-SCNC: 141 MMOL/L (ref 135–145)
SP GR UR STRIP: >=1.03 (ref 1–1.03)
TRIGL SERPL-MCNC: 191 MG/DL
UROBILINOGEN UR STRIP-ACNC: 0.2 E.U./DL
WBC # BLD AUTO: 6.3 10E3/UL (ref 4–11)
WBC #/AREA URNS AUTO: NORMAL /HPF

## 2024-03-19 PROCEDURE — G0103 PSA SCREENING: HCPCS | Performed by: INTERNAL MEDICINE

## 2024-03-19 PROCEDURE — 99213 OFFICE O/P EST LOW 20 MIN: CPT | Mod: 25 | Performed by: INTERNAL MEDICINE

## 2024-03-19 PROCEDURE — 80053 COMPREHEN METABOLIC PANEL: CPT | Performed by: INTERNAL MEDICINE

## 2024-03-19 PROCEDURE — 36415 COLL VENOUS BLD VENIPUNCTURE: CPT | Performed by: INTERNAL MEDICINE

## 2024-03-19 PROCEDURE — 99396 PREV VISIT EST AGE 40-64: CPT | Performed by: INTERNAL MEDICINE

## 2024-03-19 PROCEDURE — 85025 COMPLETE CBC W/AUTO DIFF WBC: CPT | Performed by: INTERNAL MEDICINE

## 2024-03-19 PROCEDURE — 81001 URINALYSIS AUTO W/SCOPE: CPT | Performed by: INTERNAL MEDICINE

## 2024-03-19 PROCEDURE — 80061 LIPID PANEL: CPT | Performed by: INTERNAL MEDICINE

## 2024-03-19 RX ORDER — AMLODIPINE BESYLATE 5 MG/1
5 TABLET ORAL DAILY
Qty: 90 TABLET | Refills: 3 | Status: SHIPPED | OUTPATIENT
Start: 2024-03-19

## 2024-03-19 RX ORDER — NICOTINAMIDE 100; 2; 850; 750; 50; 27 UG/1; MG/1; UG/1; MG/1; UG/1; MG/1
1000 TABLET ORAL DAILY
COMMUNITY

## 2024-03-19 RX ORDER — PRAVASTATIN SODIUM 20 MG
20 TABLET ORAL DAILY
Qty: 90 TABLET | Refills: 3 | Status: SHIPPED | OUTPATIENT
Start: 2024-03-19

## 2024-03-19 ASSESSMENT — PAIN SCALES - GENERAL: PAINLEVEL: NO PAIN (0)

## 2024-03-19 NOTE — PROGRESS NOTES
"Preventive Care Visit  [facility] KAM Neff MD, Internal Medicine - Pediatrics  Mar 19, 2024      Assessment & Plan     (Z00.00) Routine general medical examination at a health care facility  (primary encounter diagnosis)    (I10) Essential hypertension  Comment:    Adequate control.  Continue current medication.   Plan: amLODIPine (NORVASC) 5 MG tablet          (E78.5) Hyperlipidemia LDL goal <160  Comment:      Plan: pravastatin (PRAVACHOL) 20 MG tablet,         Comprehensive metabolic panel (BMP + Alb, Alk         Phos, ALT, AST, Total. Bili, TP), Lipid panel         reflex to direct LDL Fasting          (G47.30) Sleep apnea, unspecified type  Comment: sleep apnea symptoms, referral for sleep study  Plan: Adult Sleep Eval & Management          Referral          (R35.0) Urinary frequency  Comment: persistent nocturia 1-2 times per night, incomplete voiding sx.  Normal prostate exam.  Check ua.  Consider urethral stricture, referral to urology  Plan: Adult Urology  Referral, UA with         Microscopic reflex to Culture - lab collect          (C44.91) Recurrent basal cell carcinoma of skin  Comment:   Plan: dermatology follow-up 2-3 times per year    (R68.89) Abnormal clinical finding  Comment: rpt CBC  Plan: CBC with platelets and differential          (Z12.5) Screening for prostate cancer  Comment:   Plan: PSA, screen                      BMI  Estimated body mass index is 28.29 kg/m  as calculated from the following:    Height as of this encounter: 1.866 m (6' 1.47\").    Weight as of this encounter: 98.5 kg (217 lb 3.2 oz).       Counseling  Appropriate preventive services were discussed with this patient, including applicable screening as appropriate for fall prevention, nutrition, physical activity, Tobacco-use cessation, weight loss and cognition.  Checklist reviewing preventive services available has been given to the patient.  Reviewed patient's diet, addressing " concerns and/or questions.   He is at risk for lack of exercise and has been provided with information to increase physical activity for the benefit of his well-being.           Subjective   Nic is a 54 year old, presenting for the following:  Physical        3/19/2024     7:25 AM   Additional Questions   Roomed by Domenica Martino   Accompanied by CHARLES        HPI              3/12/2024   General Health   How would you rate your overall physical health? Good   Feel stress (tense, anxious, or unable to sleep) Work related   (!) STRESS CONCERN      3/12/2024   Nutrition   Three or more servings of calcium each day? Yes   Diet: Regular (no restrictions)   How many servings of fruit and vegetables per day? (!) 2-3   How many sweetened beverages each day? (!) 2         3/12/2024   Exercise   Days per week of moderate/strenous exercise 3 days   Average minutes spent exercising at this level 30 min         3/12/2024   Social Factors   Frequency of gathering with friends or relatives Twice a week   Worry food won't last until get money to buy more No   Food not last or not have enough money for food? No   Do you have housing?  Yes   Are you worried about losing your housing? No   Lack of transportation? No   Unable to get utilities (heat,electricity)? No         3/12/2024   Fall Risk   Fallen 2 or more times in the past year? No   Trouble with walking or balance? No          3/12/2024   Dental   Dentist two times every year? Yes         3/12/2024   TB Screening   Were you born outside of the [place]? No         Today's PHQ-2 Score:       3/19/2024     7:21 AM   PHQ-2 ( 1999 [employer])   Q1: Little interest or pleasure in doing things 0   Q2: Feeling down, depressed or hopeless 0   PHQ-2 Score 0   Q1: Little interest or pleasure in doing things Not at all   Q2: Feeling down, depressed or hopeless Not at all   PHQ-2 Score 0           3/12/2024   Substance Use   Alcohol more than 3/day or more than 7/wk No   Do you use any other substances  recreationally? No     Social History     Tobacco Use    Smoking status: Never    Smokeless tobacco: Never   Substance Use Topics    Alcohol use: Yes     Comment: 2-3 drinks per week    Drug use: No           3/12/2024   STI Screening   New sexual partner(s) since last STI/HIV test? No   Last PSA:   PSA   Date Value Ref Range Status   01/11/2021 2.28 0 - 4 ug/L Final     Comment:     Assay Method:  Chemiluminescence using Siemens Vista analyzer     Prostate Specific Antigen Screen   Date Value Ref Range Status   01/31/2023 1.90 0.00 - 3.50 ng/mL Final     ASCVD Risk   The 10-year ASCVD risk score (William CLARK, et al., 2019) is: 8.7%    Values used to calculate the score:      Age: 54 years      Sex: Male      Is Non- : No      Diabetic: No      Tobacco smoker: No      Systolic Blood Pressure: 132 mmHg      Is BP treated: Yes      HDL Cholesterol: 41 mg/dL      Total Cholesterol: 195 mg/dL           Reviewed and updated as needed this visit by Provider                    Patient Active Problem List   Diagnosis    Hyperlipidemia LDL goal <160    Recurrent basal cell carcinoma of skin    Onychomycosis    Hemorrhoids, unspecified hemorrhoid type    Benign prostatic hyperplasia with lower urinary tract symptoms, symptom details unspecified    SCC (squamous cell carcinoma), face    Sleep apnea, unspecified type     Past Surgical History:   Procedure Laterality Date    COLONOSCOPY  2007    COLONOSCOPY  12/26/2012    Procedure: COLONOSCOPY;  Colonoscopy ;  Surgeon: Ranulfo Ramirez MD;  Location:  GI    COLONOSCOPY N/A 12/20/2016    Procedure: COLONOSCOPY;  Surgeon: Paula Johnson MD;  Location:  GI    HERNIA REPAIR      ZZC NONSPECIFIC PROCEDURE      Hernia       Social History     Tobacco Use    Smoking status: Never    Smokeless tobacco: Never   Substance Use Topics    Alcohol use: Yes     Comment: 2-3 drinks per week     Family History   Problem Relation Age of Onset    Cancer  "Maternal Grandmother         basal cell    Cerebrovascular Disease Maternal Grandmother     Cancer Maternal Grandfather         basal cell    Cerebrovascular Disease Paternal Grandmother     Alcohol/Drug Paternal Grandfather     Hypertension Mother     Depression Mother     Anxiety Disorder Mother     Heart Disease Father         anuerysm    Hypertension Father     Cancer Father         lung    Other Cancer Father         Lung    Diabetes No family hx of     Colon Cancer No family hx of          Current Outpatient Medications   Medication Sig Dispense Refill    Acetaminophen (TYLENOL PO)       amLODIPine (NORVASC) 5 MG tablet Take 1 tablet (5 mg) by mouth daily 90 tablet 3    meclizine (ANTIVERT) 25 MG tablet Take 1 tablet (25 mg) by mouth 3 times daily as needed (motion sickness) 90 tablet 3    Niacinamide-Zn-Cu-Lfdsli-Rk-Mc (NICOTINAMIDE) 750-27-2-0.5 MG TABS Take 1,000 mg by mouth daily      pravastatin (PRAVACHOL) 20 MG tablet Take 1 tablet (20 mg) by mouth daily 90 tablet 3          Review of Systems  Constitutional, neuro, ENT, endocrine, pulmonary, cardiac, gastrointestinal, genitourinary, musculoskeletal, integument and psychiatric systems are negative, except as otherwise noted.     Objective    Exam  /80 (BP Location: Right arm, Patient Position: Sitting, Cuff Size: Adult Regular)   Pulse 71   Temp 97.2  F (36.2  C) (Tympanic)   Resp 16   Ht 1.866 m (6' 1.47\")   Wt 98.5 kg (217 lb 3.2 oz)   SpO2 96%   BMI 28.29 kg/m     Estimated body mass index is 28.29 kg/m  as calculated from the following:    Height as of this encounter: 1.866 m (6' 1.47\").    Weight as of this encounter: 98.5 kg (217 lb 3.2 oz).    Physical Exam  GENERAL: alert and no distress  EYES: Eyes grossly normal to inspection, PERRL and conjunctivae and sclerae normal  HENT: ear canals and TM's normal, nose and mouth without ulcers or lesions  NECK: no adenopathy, no asymmetry, masses, or scars  RESP: lungs clear to auscultation - " no rales, rhonchi or wheezes  CV: regular rate and rhythm, normal S1 S2, no S3 or S4, no murmur, click or rub, no peripheral edema  ABDOMEN: soft, nontender, no hepatosplenomegaly, no masses and bowel sounds normal  MS: no gross musculoskeletal defects noted, no edema  NEURO: Normal strength and tone, mentation intact and speech normal  PSYCH: mentation appears normal, affect normal/bright  Prostate: normal      Signed Electronically by: Ranulfo Neff MD

## 2024-03-19 NOTE — PATIENT INSTRUCTIONS
Preventive Care Advice   This is general advice given by our system to help you stay healthy. However, your care team may have specific advice just for you. Please talk to your care team about your preventive care needs.  Nutrition  Eat 5 or more servings of fruits and vegetables each day.  Try wheat bread, brown rice and whole grain pasta (instead of white bread, rice, and pasta).  Get enough calcium and vitamin D. Check the label on foods and aim for 100% of the RDA (recommended daily allowance).  Lifestyle  Exercise at least 150 minutes each week   (30 minutes a day, 5 days a week).  Do muscle strengthening activities 2 days a week. These help control your weight and prevent disease.  No smoking.  Wear sunscreen to prevent skin cancer.  Have a dental exam and cleaning every 6 months.  Yearly exams  See your health care team every year to talk about:  Any changes in your health.  Any medicines your care team has prescribed.  Preventive care, family planning, and ways to prevent chronic diseases.  Shots (vaccines)   HPV shots (up to age 26), if you've never had them before.  Hepatitis B shots (up to age 59), if you've never had them before.  COVID-19 shot: Get this shot when it's due.  Flu shot: Get a flu shot every year.  Tetanus shot: Get a tetanus shot every 10 years.  Pneumococcal, hepatitis A, and RSV shots: Ask your care team if you need these based on your risk.  Shingles shot (for age 50 and up).  General health tests  Diabetes screening:  Starting at age 35, Get screened for diabetes at least every 3 years.  If you are younger than age 35, ask your care team if you should be screened for diabetes.  Cholesterol test: At age 39, start having a cholesterol test every 5 years, or more often if advised.  Bone density scan (DEXA): At age 50, ask your care team if you should have this scan for osteoporosis (brittle bones).  Hepatitis C: Get tested at least once in your life.  STIs (sexually transmitted  infections)  Before age 24: Ask your care team if you should be screened for STIs.  After age 24: Get screened for STIs if you're at risk. You are at risk for STIs (including HIV) if:  You are sexually active with more than one person.  You don't use condoms every time.  You or a partner was diagnosed with a sexually transmitted infection.  If you are at risk for HIV, ask about PrEP medicine to prevent HIV.  Get tested for HIV at least once in your life, whether you are at risk for HIV or not.  Cancer screening tests  Cervical cancer screening: If you have a cervix, begin getting regular cervical cancer screening tests at age 21. Most people who have regular screenings with normal results can stop after age 65. Talk about this with your provider.  Breast cancer scan (mammogram): If you've ever had breasts, begin having regular mammograms starting at age 40. This is a scan to check for breast cancer.  Colon cancer screening: It is important to start screening for colon cancer at age 45.  Have a colonoscopy test every 10 years (or more often if you're at risk) Or, ask your provider about stool tests like a FIT test every year or Cologuard test every 3 years.  To learn more about your testing options, visit: [email or web address].  For help making a decision, visit: https://bit.ly/cq52964.  Prostate cancer screening test: If you have a prostate and are age 55 to 69, ask your provider if you would benefit from a yearly prostate cancer screening test.  Lung cancer screening: If you are a current or former smoker age 50 to 80, ask your care team if ongoing lung cancer screenings are right for you.  For informational purposes only. Not to replace the advice of your health care provider. Copyright   2023 [employer]. All rights reserved. Clinically reviewed by the [facility]. [employer] 475787 - REV 01/24.    Eating Healthy Foods: Care Instructions  With every meal, you  "can make healthy food choices. Try to eat a variety of fruits, vegetables, whole grains, lean proteins, and low-fat dairy products. This can help you get the right balance of nutrients, including vitamins and minerals. Small changes add up over time. You can start by adding one healthy food to your meals each day.    Try to make half your plate fruits and vegetables, one-fourth whole grains, and one-fourth lean proteins. Try including dairy with your meals.   Eat more fruits and vegetables. Try to have them with most meals and snacks.   Foods for healthy eating    Fruits    These can be fresh, frozen, canned, or dried.  Try to choose whole fruit rather than fruit juice.  Eat a variety of colors.    Vegetables    These can be fresh, frozen, canned, or dried.  Beans, peas, and lentils count too.    Whole grains    Choose whole-grain breads, cereals, and noodles.  Try brown rice.    Lean proteins    These can include lean meat, poultry, fish, and eggs.  You can also have tofu, beans, peas, lentils, nuts, and seeds.    Dairy    Try milk, yogurt, and cheese.  Choose low-fat or fat-free when you can.  If you need to, use lactose-free milk or fortified plant-based milk products, such as soy milk.    Water    Drink water when you're thirsty.  Limit sugar-sweetened drinks, including soda, fruit drinks, and sports drinks.  Where can you learn more?  Go to https://www.[employer].net/patiented  Enter T756 in the search box to learn more about \"Eating Healthy Foods: Care Instructions.\"  Current as of: September 20, 2023               Content Version: 14.0    1168-9016 [employer].   Care instructions adapted under license by your healthcare professional. If you have questions about a medical condition or this instruction, always ask your healthcare professional. [employer] disclaims any warranty or liability for your use of this information.      "

## 2024-04-04 ASSESSMENT — SLEEP AND FATIGUE QUESTIONNAIRES
HOW LIKELY ARE YOU TO NOD OFF OR FALL ASLEEP WHILE SITTING QUIETLY AFTER LUNCH WITHOUT ALCOHOL: WOULD NEVER DOZE
HOW LIKELY ARE YOU TO NOD OFF OR FALL ASLEEP WHILE WATCHING TV: MODERATE CHANCE OF DOZING
HOW LIKELY ARE YOU TO NOD OFF OR FALL ASLEEP IN A CAR, WHILE STOPPED FOR A FEW MINUTES IN TRAFFIC: WOULD NEVER DOZE
HOW LIKELY ARE YOU TO NOD OFF OR FALL ASLEEP WHILE LYING DOWN TO REST IN THE AFTERNOON WHEN CIRCUMSTANCES PERMIT: SLIGHT CHANCE OF DOZING
HOW LIKELY ARE YOU TO NOD OFF OR FALL ASLEEP WHILE SITTING AND READING: WOULD NEVER DOZE
HOW LIKELY ARE YOU TO NOD OFF OR FALL ASLEEP WHILE SITTING AND TALKING TO SOMEONE: WOULD NEVER DOZE
HOW LIKELY ARE YOU TO NOD OFF OR FALL ASLEEP WHEN YOU ARE A PASSENGER IN A CAR FOR AN HOUR WITHOUT A BREAK: WOULD NEVER DOZE
HOW LIKELY ARE YOU TO NOD OFF OR FALL ASLEEP WHILE SITTING INACTIVE IN A PUBLIC PLACE: WOULD NEVER DOZE

## 2024-04-05 ENCOUNTER — OFFICE VISIT (OUTPATIENT)
Dept: SLEEP MEDICINE | Facility: CLINIC | Age: 54
End: 2024-04-05
Attending: INTERNAL MEDICINE
Payer: COMMERCIAL

## 2024-04-05 VITALS
HEART RATE: 68 BPM | BODY MASS INDEX: 29.29 KG/M2 | SYSTOLIC BLOOD PRESSURE: 128 MMHG | WEIGHT: 221 LBS | OXYGEN SATURATION: 96 % | RESPIRATION RATE: 16 BRPM | DIASTOLIC BLOOD PRESSURE: 86 MMHG | HEIGHT: 73 IN

## 2024-04-05 DIAGNOSIS — R35.1 NOCTURIA: ICD-10-CM

## 2024-04-05 DIAGNOSIS — R06.83 SNORING: Primary | ICD-10-CM

## 2024-04-05 PROCEDURE — 99203 OFFICE O/P NEW LOW 30 MIN: CPT

## 2024-04-05 NOTE — PATIENT INSTRUCTIONS
"        MY TREATMENT INFORMATION FOR SLEEP APNEA-  Manny Pitt    DOCTOR : HOLGER Gomez CNP    Am I having a sleep study at a sleep center?  --->Due to normal delays, you will be contacted within 2-4 weeks to schedule    Am I having a home sleep study?  --->Watch the video for the device you are using:    -/drop off device-   https://www.MyFit.com/watch?v=yGGFBdELGhk    Frequently asked questions:  1. What is Obstructive Sleep Apnea (CONCHA)? CONCHA is the most common type of sleep apnea. Apnea means, \"without breath.\"  Apnea is most often caused by narrowing or collapse of the upper airway as muscles relax during sleep.   Almost everyone has occasional apneas. Most people with sleep apnea have had brief interruptions at night frequently for many years.  The severity of sleep apnea is related to how frequent and severe the events are.   2. What are the consequences of CONCHA? Symptoms include: feeling sleepy during the day, snoring loudly, gasping or stopping of breathing, trouble sleeping, and occasionally morning headaches or heartburn at night.  Sleepiness can be serious and even increase the risk of falling asleep while driving. Other health consequences may include development of high blood pressure and other cardiovascular disease in persons who are susceptible. Untreated CONCHA can contribute to heart disease, stroke and diabetes.   3. What are the treatment options? In most situations, sleep apnea is a lifelong disease that must be managed with daily therapy. Medications are not effective for sleep apnea and surgery is generally not considered until other therapies have been tried. Your treatment is your choice . Continuous Positive Airway (CPAP) works right away and is the therapy that is effective in nearly everyone. An oral device to hold your jaw forward is usually the next most reliable option. Other options include postioning devices (to keep you off your back), weight loss, and surgery " including a tongue pacing device. There is more detail about some of these options below.  4. Are my sleep studies covered by insurance? Although we will request verification of coverage, we advise you also check in advance of the study to ensure there is coverage.    Important tips for those choosing CPAP and similar devices  REMEMBER-IF YOU RECEIVE A CALL FROM  [phone]-->IT IS TO SETUP A DEVICE  For new devices, sign up for device GIRISH to monitor your device for your followup visits  We encourage you to utilize the Variable girish or website (https://Emerging [employer]/) to monitor your therapy progress and share the data with your healthcare team when you discuss your sleep apnea.                                                    Know your equipment:  CPAP is continuous positive airway pressure that prevents obstructive sleep apnea by keeping the throat from collapsing while you are sleeping. In most cases, the device is  smart  and can slowly self-adjusts if your throat collapses and keeps a record every day of how well you are treated-this information is available to you and your care team.  BPAP is bilevel positive airway pressure that keeps your throat open and also assists each breath with a pressure boost to maintain adequate breathing.  Special kinds of BPAP are used in patients who have inadequate breathing from lung or heart disease. In most cases, the device is  smart  and can slowly self-adjusts to assist breathing. Like CPAP, the device keeps a record of how well you are treated.  Your mask is your connection to the device. You get to choose what feels most comfortable and the staff will help to make sure if fits. Here: are some examples of the different masks that are available: Magnetic mask aids may assist with use but there are safety issues that should be addressed when considering with magnets* (see end of discussion).       Key points to remember on your journey with sleep apnea:  Sleep  study.  PAP devices often need to be adjusted during a sleep study to show that they are effective and adjusted right.  Good tips to remember: Try wearing just the mask during a quiet time during the day so your body adapts to wearing it. A humidifier is recommended for comfort in most cases to prevent drying of your nose and throat. Allergy medication from your provider may help you if you are having nasal congestion.  Getting settled-in. It takes more than one night for most of us to get used to wearing a mask. Try wearing just the mask during a quiet time during the day so your body adapts to wearing it. A humidifier is recommended for comfort in most cases. Our team will work with you carefully on the first day and will be in contact within 4 days and again at 2 and 4 weeks for advice and remote device adjustments. Your therapy is evaluated by the device each day.   Use it every night. The more you are able to sleep naturally for 7-8 hours, the more likely you will have good sleep and to prevent health risks or symptoms from sleep apnea. Even if you use it 4 hours it helps. Occasionally all of us are unable to use a medical therapy, in sleep apnea, it is not dangerous to miss one night.   Communicate. Call our skilled team on the number provided on the first day if your visit for problems that make it difficult to wear the device. Over 2 out of 3 patients can learn to wear the device long-term with help from our team. Remember to call our team or your sleep providers if you are unable to wear the device as we may have other solutions for those who cannot adapt to mask CPAP therapy. It is recommended that you sleep your sleep provider within the first 3 months and yearly after that if you are not having problems.   Use it for your health. We encourage use of CPAP masks during daytime quiet periods to allow your face and brain to adapt to the sensation of CPAP so that it will be a more natural sensation to awaken  to at night or during naps. This can be very useful during the first few weeks or months of adapting to CPAP though it does not help medically to wear CPAP during wakefulness and  should not be used as a strategy just to meet guidelines.  Take care of your equipment. Make sure you clean your mask and tubing using directions every day and that your filter and mask are replaced as recommended or if they are not working.     *Masks with magnets:  Updated Contraindications  Masks with magnetic components are contraindicated for use by patients where they, or anyone in close physical contact while using the mask, have the following:   Active medical implants that interact with magnets (i.e., pacemakers, implantable cardioverter defibrillators (ICD), neurostimulators, cerebrospinal fluid (CSF) shunts, insulin/infusion pumps)   Metallic implants/objects containing ferromagnetic material (i.e., aneurysm clips/flow disruption devices, embolic coils, stents, valves, electrodes, implants to restore hearing or balance with implanted magnets, ocular implants, metallic splinters in the eye)  Updated Warning  Keep the mask magnets at a safe distance of at least 6 inches (150 mm) away from implants or medical devices that may be adversely affected by magnetic interference. This warning applies to you or anyone in close physical contact with your mask. The magnets are in the frame and lower headgear clips, with a magnetic field strength of up to 400mT. When worn, they connect to secure the mask but may inadvertently detach while asleep.  Implants/medical devices, including those listed within contraindications, may be adversely affected if they change function under external magnetic fields or contain ferromagnetic materials that attract/repel to magnetic fields (some metallic implants, e.g., contact lenses with metal, dental implants, metallic cranial plates, screws, edu hole covers, and bone substitute devices). Consult your  physician and  of your implant / other medical device for information on the potential adverse effects of magnetic fields.    BESIDES CPAP, WHAT OTHER THERAPIES ARE THERE?    Positioning Device  Positioning devices are generally used when sleep apnea is mild and only occurs on your back.This example shows a pillow that straps around the waist. It may be appropriate for those whose sleep study shows milder sleep apnea that occurs primarily when lying flat on one's back. Preliminary studies have shown benefit but effectiveness at home may need to be verified by a home sleep test. These devices are generally not covered by medical insurance.  Examples of devices that maintain sleeping on the back to prevent snoring and mild sleep apnea.    Belt type body positioner  http://[employer] Comp[employer]/    Electronic reminder  http://nightshifttherapy.com/            Oral Appliance  What is oral appliance therapy?  An oral appliance device fits on your teeth at night like a retainer used after having braces. The device is made by a specialized dentist and requires several visits over 1-2 months before a manufactured device is made to fit your teeth and is adjusted to prevent your sleep apnea. Once an oral device is working properly, snoring should be improved. A home sleep test may be recommended at that time if to determine whether the sleep apnea is adequately treated.       Some things to remember:  -Oral devices are often, but not always, covered by your medical insurance. Be sure to check with your insurance provider.   -If you are referred for oral therapy, you will be given a list of specialized dentists to consider or you may choose to visit the Web site of the American Academy of Dental Sleep Medicine  -Oral devices are less likely to work if you have severe sleep apnea or are extremely overweight.     More detailed information  An oral appliance is a small acrylic device that fits over the upper and lower teeth   (similar to a retainer or a mouth guard). This device slightly moves jaw forward, which moves the base of the tongue forward, opens the airway, improves breathing for effective treat snoring and obstructive sleep apnea in perhaps 7 out of 10 people .  The best working devices are custom-made by a dental device  after a mold is made of the teeth 1, 2, 3.  When is an oral appliance indicated?  Oral appliance therapy is recommended as a first-line treatment for patients with primary snoring, mild sleep apnea, and for patients with moderate sleep apnea who prefer appliance therapy to use of CPAP4, 5. Severity of sleep apnea is determined by sleep testing and is based on the number of respiratory events per hour of sleep.   How successful is oral appliance therapy?  The success rate of oral appliance therapy in patients with mild sleep apnea is 75-80% while in patients with moderate sleep apnea it is 50-70%. The chance of success in patients with severe sleep apnea is 40-50%. The research also shows that oral appliances have a beneficial effect on the cardiovascular health of CONCHA patients at the same magnitude as CPAP therapy7.  Oral appliances should be a second-line treatment in cases of severe sleep apnea, but if not completely successful then a combination therapy utilizing CPAP plus oral appliance therapy may be effective. Oral appliances tend to be effective in a broad range of patients although studies show that the patients who have the highest success are females, younger patients, those with milder disease, and less severe obesity. 3, 6.   Finding a dentist that practices dental sleep medicine  Specific training is available through the American Academy of Dental Sleep Medicine for dentists interested in working in the field of sleep. To find a dentist who is educated in the field of sleep and the use of oral appliances, near you, visit the Web site of the American Academy of Dental Sleep  Medicine.    References  1. Suri et al. Objectively measured vs self-reported compliance during oral appliance therapy for sleep-disordered breathing. Chest 2013; 144(5): 5951-5935.  2. Wendy et al. Objective measurement of compliance during oral appliance therapy for sleep-disordered breathing. Thorax 2013; 68(1): 91-96.  3. Porfirio et al. Mandibular advancement devices in 620 men and women with CONCHA and snoring: tolerability and predictors of treatment success. Chest 2004; 125: 4732-8683.  4. Yvonne et al. Oral appliances for snoring and CONCHA: a review. Sleep 2006; 29: 244-262.  5. Lu et al. Oral appliance treatment for CONCHA: an update. J Clin Sleep Med 2014; 10(2): 215-227.  6. Cameron et al. Predictors of OSAH treatment outcome. J Dent Res 2007; 86: 7862-3419.      Weight Loss: Your Body mass index is 29.16 kg/m .    Being overweight does not necessarily mean you will have health consequences.  Those who have BMI over 35 or over 27 with existing medical conditions carries greater risk.   Weight loss decreases severity of sleep apnea in most people with obesity. For those with mild obesity who have developed snoring with weight gain, even 15-30 pound weight loss can improve and occasionally milder eliminate sleep apnea.  Structured and life-long dietary and health habits are necessary to lose weight and keep healthier weight levels.     The Comprehensive Weight loss program offers all aspects of weight loss strategies including two Non-Surgical Weight Loss Programs: Medical Weight Management and our 24 Week Healthy Lifestyle Program:    Medical Weight Management: You will meet with a Medical Weight Management Provider, as well as a Registered Dietician. The program may include medication therapy, dietary education, recommended exercise and physical therapy programs, monthly support group meetings, and possible psychological counseling. Follow up visits with the provider or dietician are  scheduled based on your progress and needs.    24 Week Healthy Lifestyle Program: This unique program is designed to give you the support of weekly appointments and activities thru a 24-week period. It may include all of the components of the basic program (above), with the addition of 11 individual Health  Visits, 24-week access to the [employer] website for over 700 online classes, and monthly support group meetings. This program has an out-of-pocket expense of $499 to cover the items that can not be billed to insurance (health coaches and [employer] access), and is non-refundable/non-transferable (you may be able to use a Health Savings Account; ask your HSA provider). There may be an optional meal replacement plan prescribed as well.   Surgical management achieves meaningful long-term weight loss and improvement in health risks in most patients with more severe obesity.      Sleep Apnea Surgery:    Surgery for obstructive sleep apnea is considered generally only when other therapies fail to work. Surgery may be discussed with you if you are having a difficult time tolerating CPAP and or when there is an abnormal structure that requires surgical correction.  Nose and throat surgeries often enlarge the airway to prevent collapse.  Most of these surgeries create pain for 1-2 weeks and up to half of the most common surgeries are not effective throughout life.  You should carefully discuss the benefits and drawbacks to surgery with your sleep provider and surgeon to determine if it is the best solution for you.   More information  Surgery for CONCHA is directed at areas that are responsible for narrowing or complete obstruction of the airway during sleep.  There are a wide range of procedures available to enlarge and/or stabilize the airway to prevent blockage of breathing in the three major areas where it can occur: the palate, tongue, and nasal regions.  Successful surgical treatment depends on the accurate  identification of the factors responsible for obstructive sleep apnea in each person.  A personalized approach is required because there is no single treatment that works well for everyone.  Because of anatomic variation, consultation with an examination by a sleep surgeon is a critical first step in determining what surgical options are best for each patient.  In some cases, examination during sedation may be recommended in order to guide the selection of procedures.  Patients will be counseled about risks and benefits as well as the typical recovery course after surgery. Surgery is typically not a cure for a person s CONCHA.  However, surgery will often significantly improve one s CONCHA severity (termed  success rate ).  Even in the absence of a cure, surgery will decrease the cardiovascular risk associated with OSA7; improve overall quality of life8 (sleepiness, functionality, sleep quality, etc).    Palate Procedures:  Patients with CONCHA often have narrowing of their airway in the region of their tonsils and uvula.  The goals of palate procedures are to widen the airway in this region as well as to help the tissues resist collapse.  Modern palate procedure techniques focus on tissue conservation and soft tissue rearrangement, rather than tissue removal.  Often the uvula is preserved in this procedure. Residual sleep apnea is common in patient after pharyngoplasty with an average reduction in sleep apnea events of 33%2.      Tongue Procedures:  While patients are awake, the muscles that surround the throat are active and keep this region open for breathing. These muscles relax during sleep, allowing the tongue and other structures to collapse and block breathing.  There are several different tongue procedures available.  Selection of a tongue base procedure depends on characteristics seen on physical exam.  Generally, procedures are aimed at removing bulky tissues in this area or preventing the back of the tongue from  falling back during sleep.  Success rates for tongue surgery range from 50-62%3.    Hypoglossal Nerve Stimulation:  Hypoglossal nerve stimulation has recently received approval from the [place] Food and Drug Administration for the treatment of obstructive sleep apnea.  This is based on research showing that the system was safe and effective in treating sleep apnea6.  Results showed that the median AHI score decreased 68%, from 29.3 to 9.0. This therapy uses an implant system that senses breathing patterns and delivers mild stimulation to airway muscles, which keeps the airway open during sleep.  The system consists of three fully implanted components: a small generator (similar in size to a pacemaker), a breathing sensor, and a stimulation lead.  Using a small handheld remote, a patient turns the therapy on before bed and off upon awakening.    Candidates for this device must be greater than 18 years of age, have moderate to severe obstructive sleep apnea with less than 25% central events  (AHI between 15-65), BMI less than 35, have tried CPAP/oral appliance for at least 8 weeks without success, and have appropriate upper airway anatomy (determined by a sleep endoscopy performed by Dr. Guido Philip or Dr. Wesly Barajas).    Nasal Procedures:  Nasal obstruction can interfere with nasal breathing during the day and night.  Studies have shown that relief of nasal obstruction can improve the ability of some patients to tolerate positive airway pressure therapy for obstructive sleep apnea1.  Treatment options include medications such as nasal saline, topical corticosteroid and antihistamine sprays, and oral medications such as antihistamines or decongestants. Non-surgical treatments can include external nasal dilators for selected patients. If these are not successful by themselves, surgery can improve the nasal airway either alone or in combination with these other options.        Combination  Procedures:  Combination of surgical procedures and other treatments may be recommended, particularly if patients have more than one area of narrowing or persistent positional disease.  The success rate of combination surgery ranges from 66-80%2,3.    References  Marcela CHAWLA. The Role of the Nose in Snoring and Obstructive Sleep Apnoea: An Update.  Eur Arch Otorhinolaryngol. 2011; 268: 1365-73.   Bud SM; Catarina JA; Francisco J JR; Pallanch JF; Jordana MB; Niels SG; Galileo AMBROSIO. Surgical modifications of the upper airway for obstructive sleep apnea in adults: a systematic review and meta-analysis. SLEEP 2010;33(10):8786-5218. Joe BROOKS. Hypopharyngeal surgery in obstructive sleep apnea: an evidence-based medicine review.  Arch Otolaryngol Head Neck Surg. 2006 Feb;132(2):206-13.  Reno YH1, Broderick Y, Nolan JANESSA. The efficacy of anatomically based multilevel surgery for obstructive sleep apnea. Otolaryngol Head Neck Surg. 2003 Oct;129(4):327-35.  Kezirian E, Goldberg A. Hypopharyngeal Surgery in Obstructive Sleep Apnea: An Evidence-Based Medicine Review. Arch Otolaryngol Head Neck Surg. 2006 Feb;132(2):206-13.  Ivania PJ et al. Upper-Airway Stimulation for Obstructive Sleep Apnea.  N Engl J Med. 2014 Jan 9;370(2):139-49.  Rodolfo Y et al. Increased Incidence of Cardiovascular Disease in Middle-aged Men with Obstructive Sleep Apnea. Am J Respir Crit Care Med; 2002 166: 159-165  Reynolds EM et al. Studying Life Effects and Effectiveness of Palatopharyngoplasty (SLEEP) study: Subjective Outcomes of Isolated Uvulopalatopharyngoplasty. Otolaryngol Head Neck Surg. 2011; 144: 623-631.  WHAT IF I ONLY HAVE SNORING?  Mandibular advancement devices, lateral sleep positioning, long-term weight loss and treatment of nasal allergies have been shown to improve snoring.  Exercising tongue muscles with a game (https://apps.[employer]/us/girish/soundly-reduce-snoring/zu5745314278) or stimulating the tongue during the day with a device  (https://doi.org/10.3390/zii84116631) have improved snoring in some individuals.  https://www.[employer].com/  https://www.sleepfoundation.org/best-anti-snoring-mouthpieces-and-mouthguards    Remember to Drive Safe... Drive Alive     Sleep health profoundly affects your health, mood, and your safety.  Thirty three percent of the population (one in three of us) is not getting enough sleep and many have a sleep disorder. Not getting enough sleep or having an untreated / undertreated sleep condition may make us sleepy without even knowing it. In fact, our driving could be dramatically impaired due to our sleep health. As your provider, here are some things I would like you to know about driving:     Here are some warning signs for impairment and dangerous drowsy driving:              -Having been awake more than 16 hours               -Looking tired               -Eyelid drooping              -Head nodding (it could be too late at this point)              -Driving for more than 30 minutes     Some things you could do to make the driving safer if you are experiencing some drowsiness:              -Stop driving and rest              -Call for transportation              -Make sure your sleep disorder is adequately treated     Some things that have been shown NOT to work when experiencing drowsiness while driving:              -Turning on the radio              -Opening windows              -Eating any  distracting  /  entertaining  foods (e.g., sunflower seeds, candy, or any other)              -Talking on the phone      Your decision may not only impact your life, but also the life of others. Please, remember to drive safe for yourself and all of us.

## 2024-04-05 NOTE — PROGRESS NOTES
Outpatient Sleep Medicine Consultation:      Name: Manny Pitt MRN# [id number]   Age: 54 year old YOB: 1970     Date of Consultation: April 5, 2024  Consultation is requested by: Ranulfo Neff MD  [address] [place] [address] Ranulfo Neff  Primary care provider: Ranulfo Neff       Reason for Sleep Consult:     Manny Pitt is sent by Ranulfo Neff for a sleep consultation regarding suspected sleep apnea.    Patient s Reason for visit  Manny Pitt main reason for visit: My snoring has increased  Patient states problem(s) started: 2020  Manny Pitt's goals for this visit: To find out what I can di about snoring and sleep           Assessment and Plan:     Summary Sleep Diagnoses:  (R06.83) Snoring (primary encounter diagnosis) (R35.1) Nocturia (Z68.29) BMI 29.0-29.9, adult   Comment: Nic is a 54-year-old male who presents to the sleep clinic with symptoms of snoring and nocturia. He stopped running when the pandemic started in 2020 and he has gained some weight which has made his snoring worse. He reports gaining approximately 25 lbs. Nic denies snort/gasp arousals. He has not been told he stops breathing in the night but his girlfriend is complaining about his snoring. He feels rested in the morning most of the time. If he was able to fall asleep he tends to feel rested throughout the day. He does have trouble falling asleep when work is stressful. No purposeful naps but occasional unintentional dozing while watching TV in the evening. His STOP-BANG is 6/8- snoring, tiredness, HTN, age >50 (54), neck circumference >40 cm (41 cm), and male gender.      Plan: HST-Home Sleep Apnea Test - Noxturnal Returnable  Nic is at high risk for obstructive sleep apnea. Recommended a home sleep study. We reviewed treatment options for obstructive sleep apnea including PAP therapy, mandibular advancement device, positional therapy, surgery, weight management, and  hypoglossal nerve stimulator.     Comorbid Diagnoses:  BPH, HTN    Summary Recommendations:  Orders Placed This Encounter   Procedures    HST-Home Sleep Apnea Test - Noxturnal Returnable     Summary Counseling:    Sleep Testing Reviewed  Obstructive Sleep Apnea Reviewed  Complications of Untreated Sleep Apnea Reviewed    Patient will follow up 2-3 months after his sleep study to review results.   HOLGER Gomez CNP    Total time spent reviewing medical records, history and physical examination, review of previous testing and interpretation as well as documentation on this date: 37 minutes     CC: Ranulfo Neff MD           History of Present Illness:     Nic presents to the sleep clinic with symptoms of snoring. He stopped running when the pandemic started and he has gained some weight which has made his snoring worse. Denies snort/gasp arousals. He has not been told he stops breathing in the night but his girlfriend is complaining about his snoring. He feels rested in the morning most of the time. If he was able to fall asleep he tends to feel rested throughout the day. He does have trouble falling asleep when work is stressful.      He has gained roughly 25 lbs since 2020.     Past Sleep Evaluations: None    SLEEP-WAKE SCHEDULE:     Work/School Days: Patient goes to school/work: Yes   Usually gets into bed at 10  Takes patient about 30 minutes to 2 hours to fall asleep. He will have trouble falling asleep if he has work related stress on his mind.   Has trouble falling asleep 1 to 2 nights per week  Wakes up in the middle of the night 1 to 2 times.  Wakes up due to Snorting self awake;Use the bathroom x1-2   He has trouble falling back asleep Somtimes times a week.   It usually takes 30 minutes plus to get back to sleep. If he wakes between 4-6 AM he may have more difficulty falling back asleep.   Patient is usually up at 6:30  Uses alarm: Yes    Weekends/Non-work Days/All Other Days:  Usually gets  into bed at 11 p   Takes patient about 30 minutes to fall asleep  Patient is usually up at 6 to 7  Uses alarm: No    Sleep Need  Patient gets 6 sleep on average   Patient thinks he needs about 7 sleep    Manny Pitt prefers to sleep in this position(s): Side He might wake up on his back every once in a while.   Patient states they do the following activities in bed: Use phone, computer, or tablet    Naps  Patient takes a purposeful nap Not often times a week and naps are usually 10 minutes in duration  He feels better after a nap: No  He dozes off unintentionally 3 days per week. If he lies down in the evening, he can unintentionally doze.   Patient has had a driving accident or near-miss due to sleepiness/drowsiness: No    SLEEP DISRUPTIONS:    Breathing/Snoring  Patient snores: Yes  Other people complain about his snoring: Yes, his girlfriend is complaining about his snoring.   Patient has been told he stops breathing in his sleep: No  He has issues with the following: Morning mouth dryness;Heartburn or reflux at night;Getting up to urinate more than once Denies morning headaches. Heartburn can prevent him from falling asleep at night.     Movement:  Patient gets pain, discomfort, with an urge to move: No He denies restless legs.   It happens when he is resting: No  It happens more at night: No  Patient has been told he kicks his legs at night: Yes     Behaviors in Sleep:  Manny Pitt has experienced the following behaviors while sleeping:    He has experienced sudden muscle weakness during the day: No  Pt denies bruxism, sleep talking, sleep walking, and dream enactment behavior. Pt denies sleep paralysis, hypnagogue and cataplexy.    Is there anything else you would like your sleep provider to know: I take sleeping pill if I dont fall asleep    CAFFEINE AND OTHER SUBSTANCES:    Patient consumes caffeinated beverages per day: 3  Last caffeine use is usually: 1 pm  List of any prescribed or over the  counter stimulants that patient takes: None  List of any prescribed or over the counter sleep medication patient takes:    List of previous sleep medications that patient has tried: [employer] sleeping pills  Patient drinks alcohol to help them sleep: No  Patient drinks alcohol near bedtime: No    Family History:  Patient has a family member been diagnosed with a sleep disorder: No Some of his cousins have sleep apnea.        SCALES:    EPWORTH SLEEPINESS SCALE         4/4/2024     6:00 AM    Ansted Sleepiness Scale ( MAYRA Medina  [id number]<br>ESS - [place]/English - Final version - 21 Nov 07 - [facility] [employer].)   Sitting and reading Would never doze   Watching TV Moderate chance of dozing   Sitting, inactive in a public place (e.g. a theatre or a meeting) Would never doze   As a passenger in a car for an hour without a break Would never doze   Lying down to rest in the afternoon when circumstances permit Slight chance of dozing   Sitting and talking to someone Would never doze   Sitting quietly after a lunch without alcohol Would never doze   In a car, while stopped for a few minutes in traffic Would never doze   Ansted Score (MC) 3   Ansted Score (Sleep) 3         INSOMNIA SEVERITY INDEX (CORA)          4/4/2024     5:46 AM   Insomnia Severity Index (CORA)   Difficulty falling asleep 2   Difficulty staying asleep 2   Problems waking up too early 2   How SATISFIED/DISSATISFIED are you with your CURRENT sleep pattern? 3   How NOTICEABLE to others do you think your sleep problem is in terms of impairing the quality of your life? 1   How WORRIED/DISTRESSED are you about your current sleep problem? 2   To what extent do you consider your sleep problem to INTERFERE with your daily functioning (e.g. daytime fatigue, mood, ability to function at work/daily chores, concentration, memory, mood, etc.) CURRENTLY? 1   CORA Total Score 13       Guidelines for Scoring/Interpretation:  Total score categories:  0-7 = No clinically  "significant insomnia   8-14 = Subthreshold insomnia   15-21 = Clinical insomnia (moderate severity)  22-28 = Clinical insomnia (severe)  Used via courtesy of www.myhealth.va.gov with permission from Aiden Lopez PhD., [facility]      STOP BANG         4/5/2024    12:45 PM   STOP BANG Questionnaire (  2008, the American Society of Anesthesiologists, Inc. [employer]   Neck Cir (cm) Clinic: 41 cm   B/P Clinic: 128/86   BMI Clinic: 29.16         GAD7         No data to display                  CAGE-AID         No data to display                CAGE-AID reprinted with permission from the [place] Medical Journal, KRISTINE Boyer. and TED Jose, \"Conjoint screening questionnaires for alcohol and drug abuse\" [place] Medical Journal 94: 135-140, 1995.      PATIENT HEALTH QUESTIONNAIRE-9 (PHQ - 9)         No data to display                Developed by Sadia Mclean, Arelis Villa, Gurmeet Wade and colleagues, with an educational melva from [employer] No permission required to reproduce, translate, display or distribute.        Allergies:    Allergies   Allergen Reactions    Erythromycin Anaphylaxis    Doxycycline      Severe headache    Influenza Virus Vaccine Other (See Comments)     Severe abd pain/back pain, paresthesias       Medications:    Current Outpatient Medications   Medication Sig Dispense Refill    Acetaminophen (TYLENOL PO)       amLODIPine (NORVASC) 5 MG tablet Take 1 tablet (5 mg) by mouth daily 90 tablet 3    meclizine (ANTIVERT) 25 MG tablet Take 1 tablet (25 mg) by mouth 3 times daily as needed (motion sickness) 90 tablet 3    Niacinamide-Zn-Cu-Zuehyc-Qy-Lg (NICOTINAMIDE) 750-27-2-0.5 MG TABS Take 1,000 mg by mouth daily      pravastatin (PRAVACHOL) 20 MG tablet Take 1 tablet (20 mg) by mouth daily 90 tablet 3       Problem List:  Patient Active Problem List    Diagnosis Date Noted    Recurrent basal cell carcinoma of skin 05/09/2010     Priority: High    " Hyperlipidemia LDL goal <160 02/10/2010     Priority: High    Sleep apnea, unspecified type 03/19/2024     Priority: Medium    SCC (squamous cell carcinoma), face 11/26/2018     Priority: Medium    Benign prostatic hyperplasia with lower urinary tract symptoms, symptom details unspecified 12/13/2017     Priority: Medium    Hemorrhoids, unspecified hemorrhoid type 02/29/2016     Priority: Medium    Onychomycosis 05/09/2010     Priority: Medium        Past Medical/Surgical History:  Past Medical History:   Diagnosis Date    Esophageal reflux     Hernia, abdominal     Hypertension     Other malignant neoplasm of skin of lower limb, including hip     BASAL CELL CA S/P RESECTION 2003    Other malignant neoplasm of skin of trunk, except scrotum     BASAL CELL CA S/P RESECTION 2003    Personal history of other disorders of nervous system and sense organs     Unspecified eustachian tube disorder      Past Surgical History:   Procedure Laterality Date    COLONOSCOPY  2007    COLONOSCOPY  12/26/2012    Procedure: COLONOSCOPY;  Colonoscopy ;  Surgeon: Ranulfo Ramirez MD;  Location:  GI    COLONOSCOPY N/A 12/20/2016    Procedure: COLONOSCOPY;  Surgeon: Paula Johnson MD;  Location:  GI    HERNIA REPAIR      ZZC NONSPECIFIC PROCEDURE      Hernia       Social History:  Social History     Socioeconomic History    Marital status: Single     Spouse name: Not on file    Number of children: Not on file    Years of education: Not on file    Highest education level: Bachelor's degree (e.g., BA, AB, BS)   Occupational History    Not on file   Tobacco Use    Smoking status: Never    Smokeless tobacco: Never   Vaping Use    Vaping Use: Never used   Substance and Sexual Activity    Alcohol use: Yes     Comment: 2-3 drinks per week    Drug use: No    Sexual activity: Yes     Partners: Female     Birth control/protection: Condom   Other Topics Concern    Parent/sibling w/ CABG, MI or angioplasty before 65F 55M? Not Asked   Social  History Narrative    Not on file     Social Determinants of Health     Financial Resource Strain: Low Risk  (3/12/2024)    Financial Resource Strain     Within the past 12 months, have you or your family members you live with been unable to get utilities (heat, electricity) when it was really needed?: No   Food Insecurity: Low Risk  (3/12/2024)    Food Insecurity     Within the past 12 months, did you worry that your food would run out before you got money to buy more?: No     Within the past 12 months, did the food you bought just not last and you didn t have money to get more?: No   Transportation Needs: Low Risk  (3/12/2024)    Transportation Needs     Within the past 12 months, has lack of transportation kept you from medical appointments, getting your medicines, non-medical meetings or appointments, work, or from getting things that you need?: No   Physical Activity: Insufficiently Active (3/12/2024)    Exercise Vital Sign     Days of Exercise per Week: 3 days     Minutes of Exercise per Session: 30 min   Stress: Stress Concern Present (3/12/2024)    [employer] [employer] - Occupational Stress Questionnaire     Feeling of Stress : Rather much   Social Connections: Unknown (3/12/2024)    Social Connection and Isolation Panel [NHANES]     Frequency of Communication with Friends and Family: Not on file     Frequency of Social Gatherings with Friends and Family: Twice a week     Attends Adventism Services: Not on file     Active Member of Clubs or Organizations: Not on file     Attends Club or Organization Meetings: Not on file     Marital Status: Not on file   Interpersonal Safety: Low Risk  (3/19/2024)    Interpersonal Safety     Do you feel physically and emotionally safe where you currently live?: Yes     Within the past 12 months, have you been hit, slapped, kicked or otherwise physically hurt by someone?: No     Within the past 12 months, have you been humiliated or emotionally abused in  "other ways by your partner or ex-partner?: No   Housing Stability: Low Risk  (3/12/2024)    Housing Stability     Do you have housing? : Yes     Are you worried about losing your housing?: No       Family History:  Family History   Problem Relation Age of Onset    Cancer Maternal Grandmother         basal cell    Cerebrovascular Disease Maternal Grandmother     Cancer Maternal Grandfather         basal cell    Cerebrovascular Disease Paternal Grandmother     Alcohol/Drug Paternal Grandfather     Hypertension Mother     Depression Mother     Anxiety Disorder Mother     Heart Disease Father         anuerysm    Hypertension Father     Cancer Father         lung    Other Cancer Father         Lung    Diabetes No family hx of     Colon Cancer No family hx of        Review of Systems:  A complete review of systems reviewed by me is negative with the exeption of what has been mentioned in the history of present illness.  In the last TWO WEEKS have you experienced any of the following symptoms?  Fevers: No  Night Sweats: No  Weight Gain: No  Pain at Night: No  Double Vision: No  Changes in Vision: No  Difficulty Breathing through Nose: No  Sore Throat in Morning: Yes  Dry Mouth in the Morning: Yes  Shortness of Breath With Activity: No  Awakening with Shortness of Breath: No  Increased Cough: No  Heart Racing at Night: No  Swelling in Feet or Legs: No  Diarrhea at Night: No  Heartburn at Night: Yes  Urinating More than Once at Night: Yes  Losing Control of Urine at Night: No  Joint Pains at Night: No  Headaches in Morning: No  Weakness in Arms or Legs: No  Depressed Mood: No  Anxiety: No     Physical Examination:  Vitals: /86   Pulse 68   Resp 16   Ht 1.854 m (6' 1\")   Wt 100.2 kg (221 lb)   SpO2 96%   BMI 29.16 kg/m    BMI= Body mass index is 29.16 kg/m .    Neck Cir (cm): 41 cm    GENERAL APPEARANCE: healthy, alert, no distress, and cooperative  EYES: Eyes grossly normal to inspection  HENT: oropharynx crowded " "and retrognathia   NECK: no asymmetry, masses, or scars  RESP: no increased work of breathing noted, no audible cough or wheeze   NEURO: mentation intact and speech normal  PSYCH: mentation appears normal and affect normal/bright  Mallampati Class: IV.  Tonsillar Stage: Unable to visualize.         Data: All pertinent previous laboratory data reviewed     Recent Labs   Lab Test 03/19/24  0812 11/03/23  0258    136   POTASSIUM 4.4 4.2   CHLORIDE 105 102   CO2 24 22   ANIONGAP 12 12   GLC 98 111*   BUN 17.4 19.2   CR 1.11 0.92   JOSE A 9.4 9.6       Recent Labs   Lab Test 03/19/24  0812   WBC 6.3   RBC 5.87   HGB 17.1   HCT 51.6   MCV 88   MCH 29.1   MCHC 33.1   RDW 12.7          Recent Labs   Lab Test 03/19/24  0812   PROTTOTAL 7.5   ALBUMIN 4.4   BILITOTAL 0.7   ALKPHOS 75   AST 21   ALT 26       TSH (mU/L)   Date Value   04/18/2008 1.43   04/20/2004 1.69       No results found for: \"UAMP\", \"UBARB\", \"BENZODIAZEUR\", \"UCANN\", \"UCOC\", \"OPIT\", \"UPCP\"    No results found for: \"IRONSAT\", \"GE81254\", \"NOEMY\"    No results found for: \"PH\", \"PHARTERIAL\", \"PO2\", \"EW2RVRUBECX\", \"SAT\", \"PCO2\", \"HCO3\", \"BASEEXCESS\", \"OSIRIS\", \"BEB\"    @LABRCNTIPR(phv:4,pco2v:4,po2v:4,hco3v:4,jared:4,o2per:4)@    Echocardiology: No results found for this or any previous visit (from the past 4320 hour(s)).    Chest x-ray: No results found for this or any previous visit from the past 365 days.      Chest CT: No results found for this or any previous visit from the past 365 days.      PFT: Most Recent B[facility]ze Pulmonary Function Testing    Noe Ortega, HOLGER CNP 4/5/2024   "

## 2024-04-24 ASSESSMENT — SLEEP AND FATIGUE QUESTIONNAIRES
HOW LIKELY ARE YOU TO NOD OFF OR FALL ASLEEP WHILE SITTING AND READING: WOULD NEVER DOZE
HOW LIKELY ARE YOU TO NOD OFF OR FALL ASLEEP WHILE SITTING AND TALKING TO SOMEONE: WOULD NEVER DOZE
HOW LIKELY ARE YOU TO NOD OFF OR FALL ASLEEP WHILE LYING DOWN TO REST IN THE AFTERNOON WHEN CIRCUMSTANCES PERMIT: SLIGHT CHANCE OF DOZING
HOW LIKELY ARE YOU TO NOD OFF OR FALL ASLEEP WHILE SITTING INACTIVE IN A PUBLIC PLACE: WOULD NEVER DOZE
HOW LIKELY ARE YOU TO NOD OFF OR FALL ASLEEP WHILE SITTING QUIETLY AFTER LUNCH WITHOUT ALCOHOL: WOULD NEVER DOZE
HOW LIKELY ARE YOU TO NOD OFF OR FALL ASLEEP WHILE WATCHING TV: SLIGHT CHANCE OF DOZING
HOW LIKELY ARE YOU TO NOD OFF OR FALL ASLEEP WHEN YOU ARE A PASSENGER IN A CAR FOR AN HOUR WITHOUT A BREAK: WOULD NEVER DOZE
HOW LIKELY ARE YOU TO NOD OFF OR FALL ASLEEP IN A CAR, WHILE STOPPED FOR A FEW MINUTES IN TRAFFIC: WOULD NEVER DOZE

## 2024-04-25 ENCOUNTER — OFFICE VISIT (OUTPATIENT)
Dept: UROLOGY | Facility: CLINIC | Age: 54
End: 2024-04-25
Attending: INTERNAL MEDICINE
Payer: COMMERCIAL

## 2024-04-25 VITALS — OXYGEN SATURATION: 95 % | DIASTOLIC BLOOD PRESSURE: 89 MMHG | SYSTOLIC BLOOD PRESSURE: 154 MMHG | HEART RATE: 89 BPM

## 2024-04-25 DIAGNOSIS — R35.0 URINARY FREQUENCY: ICD-10-CM

## 2024-04-25 DIAGNOSIS — R39.9 LOWER URINARY TRACT SYMPTOMS (LUTS): Primary | ICD-10-CM

## 2024-04-25 LAB
ALBUMIN UR-MCNC: NEGATIVE MG/DL
APPEARANCE UR: CLEAR
BILIRUB UR QL STRIP: NEGATIVE
COLOR UR AUTO: YELLOW
GLUCOSE UR STRIP-MCNC: NEGATIVE MG/DL
HGB UR QL STRIP: ABNORMAL
KETONES UR STRIP-MCNC: NEGATIVE MG/DL
LEUKOCYTE ESTERASE UR QL STRIP: NEGATIVE
NITRATE UR QL: NEGATIVE
PH UR STRIP: 5 [PH] (ref 5–7)
RESIDUAL VOLUME (RV) (EXTERNAL): 18
SP GR UR STRIP: >=1.03 (ref 1–1.03)
UROBILINOGEN UR STRIP-ACNC: 0.2 E.U./DL

## 2024-04-25 PROCEDURE — 81003 URINALYSIS AUTO W/O SCOPE: CPT | Mod: QW | Performed by: NURSE PRACTITIONER

## 2024-04-25 PROCEDURE — 99204 OFFICE O/P NEW MOD 45 MIN: CPT | Mod: 25 | Performed by: NURSE PRACTITIONER

## 2024-04-25 PROCEDURE — 51798 US URINE CAPACITY MEASURE: CPT | Performed by: NURSE PRACTITIONER

## 2024-04-25 RX ORDER — TAMSULOSIN HYDROCHLORIDE 0.4 MG/1
0.4 CAPSULE ORAL DAILY
Qty: 90 CAPSULE | Refills: 4 | Status: SHIPPED | OUTPATIENT
Start: 2024-04-25

## 2024-04-25 RX ORDER — TAMSULOSIN HYDROCHLORIDE 0.4 MG/1
0.4 CAPSULE ORAL DAILY
Qty: 90 CAPSULE | Refills: 4 | Status: SHIPPED | OUTPATIENT
Start: 2024-04-25 | End: 2024-04-25

## 2024-04-25 NOTE — LETTER
4/25/2024       RE: Manny Pitt  [address] [place] [address]     Dear Colleague,    Thank you for referring your patient, Manny Pitt, to the [facility] at [facility]. Please see a copy of my visit note below.      Urology Outpatient Visit    Name: Manny Pitt    MRN: [id number]   YOB: 1970               Chief Complaint:   LUTS         Impression and Plan:   Impression / Plan:   Manny Pitt is a 54 year old male with LUTS likely 2/2 BPH      It was my pleasure to meet with Mr. Pitt in clinic today to discuss his lower urinary tract symptoms. Patient presentation is not consistent with prostate cancer, UTI, urinary obstruction, prostatitis, neurogenic bladder, diabetes, nor diuretic related. I explained that his lower urinary tract symptoms are likely secondary to benign prostatic hyperplasia (BPH). We reviewed the natural history of BPH, its prevalence, and management options. We discussed that, in general, treatment of BPH is based on the extent of bother caused by lower urinary tract symptoms. We then reviewed options for ongoing management including observation/watchful waiting, lifestyle modifications, medical management, office based minimally invasive procedures (Rezum) vs. other surgical options. Pros and cons of these options were discussed in detail. All patient questions, comments, concerns addressed.     -After shared decision making process, will go forth with daily tamsulosin. Pt educated that Flomax works by relaxing the muscles in the prostate, opening the prostatic urethra allowing for easier flow of urine. Discussed Tamsulosin is associated with orthostatic hypotension in ~1/10 patients, which can manifest as dizziness, especially upon standing. Discussed other potential side effects including rhinitis, headache. Educated that Flomax typically takes ~1 week to reach maximum  therapeutic effect.     Thank you for the opportunity to participate in the care of Manny Pitt.     HOLGER Melendez, CNP  [facility] - Department of Urology  [phone]          History of Present Illness:   Manny Pitt is a 54 year old male with LUTS ongoing for years.  Complains of nocturia 1-2 times per night.  Complains of weak urinary stream, urinary frequency/urgency, stopping/starting of stream.  Symptoms are triggered by alcohol, caffeine, and soda.  Recent PSA 2.05.    History is obtained from patient & EMR          Past Medical History:     Past Medical History:   Diagnosis Date    Esophageal reflux     Hernia, abdominal     Hypertension     Other malignant neoplasm of skin of lower limb, including hip     BASAL CELL CA S/P RESECTION 2003    Other malignant neoplasm of skin of trunk, except scrotum     BASAL CELL CA S/P RESECTION 2003    Personal history of other disorders of nervous system and sense organs     Unspecified eustachian tube disorder           Past Surgical History:     Past Surgical History:   Procedure Laterality Date    COLONOSCOPY  2007    COLONOSCOPY  12/26/2012    Procedure: COLONOSCOPY;  Colonoscopy ;  Surgeon: Ranulfo Ramirez MD;  Location:  GI    COLONOSCOPY N/A 12/20/2016    Procedure: COLONOSCOPY;  Surgeon: Paula Johnson MD;  Location:  GI    HERNIA REPAIR      ZZC NONSPECIFIC PROCEDURE      Hernia          Social History:     Social History     Tobacco Use    Smoking status: Never    Smokeless tobacco: Never   Substance Use Topics    Alcohol use: Yes     Comment: 2-3 drinks per week          Family History:     Family History   Problem Relation Age of Onset    Cancer Maternal Grandmother         basal cell    Cerebrovascular Disease Maternal Grandmother     Cancer Maternal Grandfather         basal cell    Cerebrovascular Disease Paternal Grandmother     Alcohol/Drug Paternal Grandfather     Hypertension Mother     Depression Mother     Anxiety  Disorder Mother     Heart Disease Father         anuerysm    Hypertension Father     Cancer Father         lung    Other Cancer Father         Lung    Diabetes No family hx of     Colon Cancer No family hx of           Allergies:     Allergies   Allergen Reactions    Erythromycin Anaphylaxis    Doxycycline      Severe headache    Influenza Virus Vaccine Other (See Comments)     Severe abd pain/back pain, paresthesias          Medications:     Current Outpatient Medications   Medication Sig Dispense Refill    Acetaminophen (TYLENOL PO)       amLODIPine (NORVASC) 5 MG tablet Take 1 tablet (5 mg) by mouth daily 90 tablet 3    Niacinamide-Zn-Cu-Vxynlh-Ab-Rr (NICOTINAMIDE) 750-27-2-0.5 MG TABS Take 1,000 mg by mouth daily      pravastatin (PRAVACHOL) 20 MG tablet Take 1 tablet (20 mg) by mouth daily 90 tablet 3    meclizine (ANTIVERT) 25 MG tablet Take 1 tablet (25 mg) by mouth 3 times daily as needed (motion sickness) (Patient not taking: Reported on 4/25/2024) 90 tablet 3     No current facility-administered medications for this visit.          Review of Systems:    ROS: See HPI for pertinent details.  Remainder of 10-point ROS negative.         Physical Exam:   VS:  T: Data Unavailable    HR: 89    BP: 154/89    RR: Data Unavailable     GEN:  Alert.  NAD.   HEENT:  Sclerae anicteric.    CV:  No obvious jugular venous distension.  LUNGS: No respiratory distress, breathing comfortably wo accessory muscle use.  ABD:  ND.     SKIN:  Dry. No visible rashes.   NEURO:  CN grossly intact.          Laboratory Data:     Lab Results   Component Value Date    PSA 2.05 03/19/2024    PSA 1.90 01/31/2023    PSA 2.28 01/11/2021     Lab Results   Component Value Date    CR 1.11 03/19/2024    CR 0.92 11/03/2023    CR 1.14 01/31/2023    CR 1.06 01/19/2022    CR 1.00 01/04/2021    CR 1.03 12/04/2019    CR 0.96 04/01/2019    CR 0.88 01/02/2018    CR 0.94 11/03/2017    CR 0.94 02/26/2016    CR 0.98 02/27/2015    CR 0.99 10/21/2011    CR  1.01 04/30/2010    CR 1.03 11/07/2008     Lab Results   Component Value Date    GFRESTIMATED 78 03/19/2024    GFRESTIMATED >90 11/03/2023    GFRESTIMATED 76 01/31/2023    GFRESTIMATED 83 01/19/2022    GFRESTIMATED 86 01/04/2021    GFRESTIMATED 83 12/04/2019    GFRESTIMATED >90 04/01/2019    GFRESTIMATED >90 01/02/2018    GFRESTIMATED 86 11/03/2017    GFRESTIMATED 86 02/26/2016    GFRESTIMATED 81 02/27/2015    GFRESTIMATED 83 10/21/2011    GFRESTIMATED 81 04/30/2010    GFRESTIMATED 80 11/07/2008

## 2024-04-25 NOTE — NURSING NOTE
Pt has had freq for years  Pt pt is nervous he has more freq    Pt up 1-2 times a nt.    Pt denies freq or gross hem.  Pt has skin cancer    PVR by scanner= 18mL    NEIDA Villalobos CMA

## 2024-04-25 NOTE — PROGRESS NOTES
Urology Outpatient Visit    Name: Manny Pitt    MRN: [id number]   YOB: 1970               Chief Complaint:   LUTS         Impression and Plan:   Impression / Plan:   Manny Pitt is a 54 year old male with LUTS likely 2/2 BPH      It was my pleasure to meet with Mr. Pitt in clinic today to discuss his lower urinary tract symptoms. Patient presentation is not consistent with prostate cancer, UTI, urinary obstruction, prostatitis, neurogenic bladder, diabetes, nor diuretic related. I explained that his lower urinary tract symptoms are likely secondary to benign prostatic hyperplasia (BPH). We reviewed the natural history of BPH, its prevalence, and management options. We discussed that, in general, treatment of BPH is based on the extent of bother caused by lower urinary tract symptoms. We then reviewed options for ongoing management including observation/watchful waiting, lifestyle modifications, medical management, office based minimally invasive procedures (Rezum) vs. other surgical options. Pros and cons of these options were discussed in detail. All patient questions, comments, concerns addressed.     -After shared decision making process, will go forth with daily tamsulosin. Pt educated that Flomax works by relaxing the muscles in the prostate, opening the prostatic urethra allowing for easier flow of urine. Discussed Tamsulosin is associated with orthostatic hypotension in ~1/10 patients, which can manifest as dizziness, especially upon standing. Discussed other potential side effects including rhinitis, headache. Educated that Flomax typically takes ~1 week to reach maximum therapeutic effect.     Thank you for the opportunity to participate in the care of Manny Pitt.     HOLGER Melendez, CNP  [facility] - Department of Urology  [phone]          History of Present Illness:   Manny Pitt is a 54 year old male with LUTS ongoing for years.  Complains of nocturia 1-2  times per night.  Complains of weak urinary stream, urinary frequency/urgency, stopping/starting of stream.  Symptoms are triggered by alcohol, caffeine, and soda.  Recent PSA 2.05.    History is obtained from patient & EMR          Past Medical History:     Past Medical History:   Diagnosis Date    Esophageal reflux     Hernia, abdominal     Hypertension     Other malignant neoplasm of skin of lower limb, including hip     BASAL CELL CA S/P RESECTION 2003    Other malignant neoplasm of skin of trunk, except scrotum     BASAL CELL CA S/P RESECTION 2003    Personal history of other disorders of nervous system and sense organs     Unspecified eustachian tube disorder           Past Surgical History:     Past Surgical History:   Procedure Laterality Date    COLONOSCOPY  2007    COLONOSCOPY  12/26/2012    Procedure: COLONOSCOPY;  Colonoscopy ;  Surgeon: Ranulfo Ramirez MD;  Location:  GI    COLONOSCOPY N/A 12/20/2016    Procedure: COLONOSCOPY;  Surgeon: Paula Johnson MD;  Location:  GI    HERNIA REPAIR      ZZC NONSPECIFIC PROCEDURE      Hernia          Social History:     Social History     Tobacco Use    Smoking status: Never    Smokeless tobacco: Never   Substance Use Topics    Alcohol use: Yes     Comment: 2-3 drinks per week          Family History:     Family History   Problem Relation Age of Onset    Cancer Maternal Grandmother         basal cell    Cerebrovascular Disease Maternal Grandmother     Cancer Maternal Grandfather         basal cell    Cerebrovascular Disease Paternal Grandmother     Alcohol/Drug Paternal Grandfather     Hypertension Mother     Depression Mother     Anxiety Disorder Mother     Heart Disease Father         anuerysm    Hypertension Father     Cancer Father         lung    Other Cancer Father         Lung    Diabetes No family hx of     Colon Cancer No family hx of           Allergies:     Allergies   Allergen Reactions    Erythromycin Anaphylaxis    Doxycycline      Severe  headache    Influenza Virus Vaccine Other (See Comments)     Severe abd pain/back pain, paresthesias          Medications:     Current Outpatient Medications   Medication Sig Dispense Refill    Acetaminophen (TYLENOL PO)       amLODIPine (NORVASC) 5 MG tablet Take 1 tablet (5 mg) by mouth daily 90 tablet 3    Niacinamide-Zn-Cu-Endoqc-Kb-Ar (NICOTINAMIDE) 750-27-2-0.5 MG TABS Take 1,000 mg by mouth daily      pravastatin (PRAVACHOL) 20 MG tablet Take 1 tablet (20 mg) by mouth daily 90 tablet 3    meclizine (ANTIVERT) 25 MG tablet Take 1 tablet (25 mg) by mouth 3 times daily as needed (motion sickness) (Patient not taking: Reported on 4/25/2024) 90 tablet 3     No current facility-administered medications for this visit.          Review of Systems:    ROS: See HPI for pertinent details.  Remainder of 10-point ROS negative.         Physical Exam:   VS:  T: Data Unavailable    HR: 89    BP: 154/89    RR: Data Unavailable     GEN:  Alert.  NAD.   HEENT:  Sclerae anicteric.    CV:  No obvious jugular venous distension.  LUNGS: No respiratory distress, breathing comfortably wo accessory muscle use.  ABD:  ND.     SKIN:  Dry. No visible rashes.   NEURO:  CN grossly intact.          Laboratory Data:     Lab Results   Component Value Date    PSA 2.05 03/19/2024    PSA 1.90 01/31/2023    PSA 2.28 01/11/2021     Lab Results   Component Value Date    CR 1.11 03/19/2024    CR 0.92 11/03/2023    CR 1.14 01/31/2023    CR 1.06 01/19/2022    CR 1.00 01/04/2021    CR 1.03 12/04/2019    CR 0.96 04/01/2019    CR 0.88 01/02/2018    CR 0.94 11/03/2017    CR 0.94 02/26/2016    CR 0.98 02/27/2015    CR 0.99 10/21/2011    CR 1.01 04/30/2010    CR 1.03 11/07/2008     Lab Results   Component Value Date    GFRESTIMATED 78 03/19/2024    GFRESTIMATED >90 11/03/2023    GFRESTIMATED 76 01/31/2023    GFRESTIMATED 83 01/19/2022    GFRESTIMATED 86 01/04/2021    GFRESTIMATED 83 12/04/2019    GFRESTIMATED >90 04/01/2019    GFRESTIMATED >90 01/02/2018     GFRESTIMATED 86 11/03/2017    GFRESTIMATED 86 02/26/2016    GFRESTIMATED 81 02/27/2015    GFRESTIMATED 83 10/21/2011    GFRESTIMATED 81 04/30/2010    GFRESTIMATED 80 11/07/2008

## 2024-04-29 ENCOUNTER — OFFICE VISIT (OUTPATIENT)
Dept: SLEEP MEDICINE | Facility: CLINIC | Age: 54
End: 2024-04-29
Payer: COMMERCIAL

## 2024-04-29 ENCOUNTER — DOCUMENTATION ONLY (OUTPATIENT)
Dept: SLEEP MEDICINE | Facility: CLINIC | Age: 54
End: 2024-04-29

## 2024-04-29 DIAGNOSIS — R35.1 NOCTURIA: ICD-10-CM

## 2024-04-29 DIAGNOSIS — R06.83 SNORING: ICD-10-CM

## 2024-04-29 PROCEDURE — 95800 SLP STDY UNATTENDED: CPT | Performed by: INTERNAL MEDICINE

## 2024-04-29 NOTE — PROGRESS NOTES
Pt is completing a home sleep test. Pt was instructed on how to put on the Noxturnal T3 device and associated equipment before going to bed and given the opportunity to practice putting it on before leaving the sleep center. Pt was reminded to bring the home sleep test kit back to the center tomorrow, at agreed upon time for download and reporting.   Neck circumference: 41 CM / 16 inches.  Hallie Rosado CMA, HST Specialist  [place] / [facility]

## 2024-04-30 ENCOUNTER — DOCUMENTATION ONLY (OUTPATIENT)
Dept: SLEEP MEDICINE | Facility: CLINIC | Age: 54
End: 2024-04-30
Payer: COMMERCIAL

## 2024-04-30 NOTE — PROGRESS NOTES
HST POST-STUDY QUESTIONNAIRE    What time did you go to bed?  22:00  How long do you think it took to fall asleep?  hours  What time did you wake up to start the day?  06:30  Did you get up during the night at all?  Multiple times  If you woke up, do you remember approximately what time(s)? 11PM, 12AM, 1AM, 3AM, 4AM, 6AM  Did you have any difficulty with the equipment?  No  Did you us any type of treatment with this study?  None  Was the head of the bed elevated? No  Did you sleep in a recliner?  No  Did you stop using CPAP at least 3 days before this test?  NA  Any other information you'd like us to know? I was awake a lot of the night

## 2024-05-01 NOTE — PROGRESS NOTES
This HSAT was performed using a Noxturnal T3 device which recorded snore, sound, movement activity, body position, nasal pressure, oronasal thermal airflow, pulse, oximetry and both chest and abdominal respiratory effort. HSAT data was restricted to the time patient states they were in bed.     HSAT was scored using 1B 4% hypopnea rule.     AHI: 5.7  Snoring was reported as loud.  Time with SpO2 below 89% was 3.8 minutes.   Overall signal quality was good     Pt will follow up with sleep provider to determine appropriate therapy.     Ordering Provider, Noe Ortega APRN CNP C. Oyugi, BA, RPSGT, RST System Clinical Specialist/ 5/1/2024

## 2024-05-02 NOTE — PROCEDURES
"HOME SLEEP STUDY INTERPRETATION        Patient: Manny Pitt  MRN: [id number]  YOB: 1970  Study Date: 2024  PCP/Referring Provider: Ranulfo Neff MD  Ordering Provider: ELIECER Kathleen    Indications for Home Study: Manny Pitt is a 54 year old male with a history of BPH and HTN who reports snoring and nocturia.    Estimated body mass index is 29.16 kg/m  as calculated from the following:    Height as of 24: 1.854 m (6' 1\").    Weight as of 24: 100.2 kg (221 lb).  Total score - [place]: 2 (2024  5:53 AM)  STOP-BAN/8      Data: A full night home sleep study was performed recording the standard physiologic parameters including body position, movement, sound, nasal pressure, thermal oral airflow, chest and abdominal movements with respiratory inductance plethysmography, and oxygen saturation by pulse oximetry. Pulse rate was estimated by oximetry recording. This study was considered adequate based on > 4 hours of quality oximetry and respiratory recording. As specified by the AASM Manual for the Scoring of Sleep and Associated events, version 2.3, Rule VIII.D 1B, 4% oxygen desaturation scoring for hypopneas is used as a standard of care on all home sleep apnea testing.    Analysis Time: 433.8 minutes    Respiration:   Sleep Associated Hypoxemia: sustained hypoxemia was not present.  Average oxygen saturation was 92.1%.  Time with saturation less than or equal to 88% was 1.7 minutes. The lowest oxygen saturation was 84%.   Snoring: Snoring was present.  Respiratory events: The home study revealed a presence of 3 obstructive apneas and 0 mixed and central apneas. There were 38 hypopneas resulting in a combined apnea/hypopnea index [AHI] of 5.7 events per hour.  AHI was 34.3 per hour supine(but patient spent 3.5 minutes/0.8% of total sleep time in supine position), 8.0 per hour prone, 5.3 per hour on left side, and 9.8 per hour on right side.   Pattern: Excluding " events noted above, respiratory rate and pattern was Normal.      Position: Percent of time spent: supine -0.8%, prone -1.7%, on left -94.6%, on right -2.8%.      Heart Rate: By pulse oximetry, the average pulse rate was normal at 52 bpm.      Assessment:   Mild obstructive sleep apnea was present without sleep associated hypoxemia.     Recommendations:  Consider positional therapy during sleep along with dental appliance through referral to sleep dentistry (or) empiric treatment with auto titrating CPAP with pressure settings 5-15 cmH2O and clinical follow-up for the treatment of CONCHA and snoring.  Suggest optimizing sleep hygiene and avoiding sleep deprivation.  Weight management.      Diagnosis Code(s): Obstructive Sleep Apnea G47.33, Snoring R06.83    Electronically signed by: Ruba Haile MD, May 2, 2024   Diplomate, American Board of Internal Medicine, Sleep Medicine

## 2024-05-11 ASSESSMENT — SLEEP AND FATIGUE QUESTIONNAIRES
HOW LIKELY ARE YOU TO NOD OFF OR FALL ASLEEP IN A CAR, WHILE STOPPED FOR A FEW MINUTES IN TRAFFIC: WOULD NEVER DOZE
HOW LIKELY ARE YOU TO NOD OFF OR FALL ASLEEP WHILE WATCHING TV: MODERATE CHANCE OF DOZING
HOW LIKELY ARE YOU TO NOD OFF OR FALL ASLEEP WHILE SITTING AND READING: WOULD NEVER DOZE
HOW LIKELY ARE YOU TO NOD OFF OR FALL ASLEEP WHILE SITTING AND TALKING TO SOMEONE: WOULD NEVER DOZE
HOW LIKELY ARE YOU TO NOD OFF OR FALL ASLEEP WHEN YOU ARE A PASSENGER IN A CAR FOR AN HOUR WITHOUT A BREAK: WOULD NEVER DOZE
HOW LIKELY ARE YOU TO NOD OFF OR FALL ASLEEP WHILE SITTING INACTIVE IN A PUBLIC PLACE: WOULD NEVER DOZE
HOW LIKELY ARE YOU TO NOD OFF OR FALL ASLEEP WHILE SITTING QUIETLY AFTER LUNCH WITHOUT ALCOHOL: WOULD NEVER DOZE
HOW LIKELY ARE YOU TO NOD OFF OR FALL ASLEEP WHILE LYING DOWN TO REST IN THE AFTERNOON WHEN CIRCUMSTANCES PERMIT: SLIGHT CHANCE OF DOZING

## 2024-05-15 ENCOUNTER — OFFICE VISIT (OUTPATIENT)
Dept: SLEEP MEDICINE | Facility: CLINIC | Age: 54
End: 2024-05-15
Payer: COMMERCIAL

## 2024-05-15 VITALS
WEIGHT: 224.2 LBS | DIASTOLIC BLOOD PRESSURE: 82 MMHG | HEART RATE: 63 BPM | SYSTOLIC BLOOD PRESSURE: 112 MMHG | RESPIRATION RATE: 16 BRPM | BODY MASS INDEX: 29.72 KG/M2 | OXYGEN SATURATION: 95 % | HEIGHT: 73 IN

## 2024-05-15 DIAGNOSIS — R06.83 SNORING: ICD-10-CM

## 2024-05-15 DIAGNOSIS — G47.33 OSA (OBSTRUCTIVE SLEEP APNEA): Primary | ICD-10-CM

## 2024-05-15 PROCEDURE — 99214 OFFICE O/P EST MOD 30 MIN: CPT | Performed by: INTERNAL MEDICINE

## 2024-05-15 NOTE — PROGRESS NOTES
[address] [address]  Phone: [phone]    Patient:  Manny Pitt, Date of birth 1970  Date of Visit:  05/15/2024  Referring Provider No ref. provider found           [facility]  Outpatient Sleep Medicine Consultation  May 15, 2024    Name: Manny Pitt MRN# [id number]   Age: 54 year old YOB: 1970     Date of Consultation: May 15, 2024  Consultation is requested by: No referring provider defined for this encounter.  Primary care provider: Ranulfo Neff             Assessment and Plan:   Manny Pitt is a 54 year old male with history of hypertension, hyperlipidemia, benign prostatic hypertrophy was recently diagnosed with mild obstructive sleep apnea with significant positional variability.  He is here today to discuss the results of his home sleep test.  He does report of poor sleep particularly with difficulty falling asleep on occasions and less frequently sleep maintenance insomnia.     Sleep disordered breathing. Overall mild obstructive sleep apnea but with significant positional variation.  The overall apnea-hypopnea index is 5.7 events per hour which increased markedly to 34.3 events per hour in the supine position.  He does snore loudly and does report interruptions in his sleep on a nightly basis.  In the context of history of hypertension which is being treated, use of continuous positive airway pressure as a treatment for his sleep apnea may help not only consolidate his overall sleep but impact on his long-term treatment of hypertension.  Insomnia.  Reported as sleep initiation type with occasional sleep maintenance issues.  He has been using over-the-counter sleep aid which has been helpful.  The likely underlying trigger for sleep maintenance untreated sleep-related respiratory events.      Comorbid  Diagnoses:    Hypertension.  Overweight, BMI 29.5    Summary Recommendations:    Start positive airway pressure with therapy with auto titrating CPAP 5-15 cm of water.  A prescription was given to the patient along with PAP supplies.  He reports being a mouth breather and hence will benefit from a fullface mask.  Sleep hygiene was discussed in detail.  Brief behavioral therapy counseling was done with focus on developing healthy sleep ritual, stimulus control and restricting bedtime to enhance sleep efficiency.    Summary Counseling:  -Patient is advised to follow-up in clinic in 3 months to assess ongoing treatment.    Check out [email or web address].aasmnet.org/           History of Present Illness:     Manny Pitt is a 54 year old male with history of hypertension, hyperlipidemia benign prostatic hypertrophy was recently diagnosed with obstructive sleep apnea.  A NOx T3 home sleep study on 04/29/2024 showed an elevated apnea-hypopnea index of 5.7 events per hour with a supine apnea-hypopnea index of 34.3 events per hour.  He does have long-term history of snoring which has increased in its intensity over the past 6 several years.  His girlfriend has been complaining about his snoring and would like him to address this.    We discussed the findings of the study in detail today.  It is very likely that the quality of his sleep is impacted by the untreated obstructive sleep apnea.  Various treatment options were discussed including mandibular advancement device, positional therapy and continuous positive airway pressure therapy.  Manny will prefer CPAP treatment as he is concerned with his ongoing issues with management of hypertension.    He also reports of having difficulty falling asleep between 2-3 times in a week.  He usually goes to bed around 10:30 PM consistently and on occasion will struggle with falling asleep for up to an hour.  He has been using over-the-counter antihistamine agents to help him with  sleep with an occasional use of melatonin.  Once asleep he usually wakes up once or twice to use the bathroom.  If he wakes up around 4 AM he will usually have difficulty falling back to sleep.  This significant restriction of his sleep after and usually occurs once or twice in a month.  He denies symptoms of hypnagogic, hypnopompic hallucinations, sleep paralysis.  He denies symptoms of orthopnea or paroxysmal nocturnal dyspnea.    PREVIOUS SLEEP STUDIES: Nox3, HST  Date: 04/29/24  AHI: 5.7 per hour, Supine AHI 34.3 per hour    CURRENT THERAPY-Subjective:  Sleep wake schedule:   Workday bedtime 10:30 PM  Awakening  6:30 AM usually wakes before his alarm   Nonworkday bedtime 10:30 PM Awakening 6:30 AM   Awakenings 0-2 for 2 minutes up to 2-3 hours, usually to use the bathroom per night/week  Naps: Does not take intentional naps but does fall asleep watching television      He does feel rested in the morning. Occasional morning headache    [place] Sleepiness Scale: 3/24         Medications:     Current Outpatient Medications   Medication Sig Dispense Refill    Acetaminophen (TYLENOL PO)       amLODIPine (NORVASC) 5 MG tablet Take 1 tablet (5 mg) by mouth daily 90 tablet 3    Niacinamide-Zn-Cu-Fyjkaj-Mf-Oj (NICOTINAMIDE) 750-27-2-0.5 MG TABS Take 1,000 mg by mouth daily      pravastatin (PRAVACHOL) 20 MG tablet Take 1 tablet (20 mg) by mouth daily 90 tablet 3    meclizine (ANTIVERT) 25 MG tablet Take 1 tablet (25 mg) by mouth 3 times daily as needed (motion sickness) (Patient not taking: Reported on 4/25/2024) 90 tablet 3    tamsulosin (FLOMAX) 0.4 MG capsule Take 1 capsule (0.4 mg) by mouth daily (Patient not taking: Reported on 5/15/2024) 90 capsule 4     No current facility-administered medications for this visit.        Allergies   Allergen Reactions    Erythromycin Anaphylaxis    Doxycycline      Severe headache    Influenza Virus Vaccine Other (See Comments)     Severe abd pain/back pain, paresthesias             "Past Medical History:     Does not need 02 supplement at night   Past Medical History:   Diagnosis Date    Esophageal reflux     Hernia, abdominal     Hypertension     Other malignant neoplasm of skin of lower limb, including hip     BASAL CELL CA S/P RESECTION 2003    Other malignant neoplasm of skin of trunk, except scrotum     BASAL CELL CA S/P RESECTION 2003    Personal history of other disorders of nervous system and sense organs     Unspecified eustachian tube disorder              Past Surgical History:    No h/o  upper airway surgery  Past Surgical History:   Procedure Laterality Date    COLONOSCOPY  2007    COLONOSCOPY  12/26/2012    Procedure: COLONOSCOPY;  Colonoscopy ;  Surgeon: Ranulfo Ramirez MD;  Location:  GI    COLONOSCOPY N/A 12/20/2016    Procedure: COLONOSCOPY;  Surgeon: Paula Johnson MD;  Location:  GI    HERNIA REPAIR      [facility] NONSPECIFIC PROCEDURE      Hernia            Physical Examination:     Objective   Vitals: BP (!) 139/91   Pulse 63   Resp 16   Ht 1.854 m (6' 1\")   Wt 101.7 kg (224 lb 3.2 oz)   SpO2 95%   BMI 29.58 kg/m      healthy, alert, and no distress  Psych: Alert and oriented times 3; coherent speech, normal   rate and volume, able to articulate logical thoughts, able   to abstract reason, no tangential thoughts, no hallucinations   or delusions  His affect is normal    Copy to: Ranulfo Neff    Total of 34 minutes of time was spent with patient, this included the interview and exam, and review of the chart/labs/imaging/sleep study/PAP therapy data on 05/15/2024. Greater than 50% of which was spent counseling and coordinating care.     Fadumo Steward MD 5/15/2024     [facility]   [address], [address]   [address] [place] [address]. [place] [address]   Appointments: [phone]    Fadumo Steward MD         "

## 2024-05-15 NOTE — PATIENT INSTRUCTIONS
"          MY TREATMENT INFORMATION FOR SLEEP APNEA-  Manny Pitt    DOCTOR : Fadumo Steward MD    Am I having a sleep study at a sleep center?  --->Due to normal delays, you will be contacted within 2-4 weeks to schedule    Am I having a home sleep study?  --->Watch the video for the device you are using:    -/drop off device-   https://www.DashThisube.com/watch?v=yGGFBdELGhk    -Disposable device sent out require phone/computer application-   https://www.ClickGanic.com/watch?v=BCce_vbiwxE      Frequently asked questions:  1. What is Obstructive Sleep Apnea (CONCHA)? CONCHA is the most common type of sleep apnea. Apnea means, \"without breath.\"  Apnea is most often caused by narrowing or collapse of the upper airway as muscles relax during sleep.   Almost everyone has occasional apneas. Most people with sleep apnea have had brief interruptions at night frequently for many years.  The severity of sleep apnea is related to how frequent and severe the events are.   2. What are the consequences of CONCHA? Symptoms include: feeling sleepy during the day, snoring loudly, gasping or stopping of breathing, trouble sleeping, and occasionally morning headaches or heartburn at night.  Sleepiness can be serious and even increase the risk of falling asleep while driving. Other health consequences may include development of high blood pressure and other cardiovascular disease in persons who are susceptible. Untreated CONCHA  can contribute to heart disease, stroke and diabetes.   3. What are the treatment options? In most situations, sleep apnea is a lifelong disease that must be managed with daily therapy. Medications are not effective for sleep apnea and surgery is generally not considered until other therapies have been tried. Your treatment is your choice . Continuous Positive Airway (CPAP) works right away and is the therapy that is effective in nearly everyone. An oral device to hold your jaw forward is usually the next most " reliable option. Other options include postioning devices (to keep you off your back), weight loss, and surgery including a tongue pacing device. There is more detail about some of these options below.  4. Are my sleep studies covered by insurance? Although we will request verification of coverage, we advise you also check in advance of the study to ensure there is coverage.    Important tips for those choosing CPAP and similar devices  REMEMBER-IF YOU RECEIVE A CALL FROM  [phone]-->IT IS TO SETUP A DEVICE  For new devices, sign up for device GIRISH to monitor your device for your followup visits  We encourage you to utilize the [employer] girish or website ( https://Flutter.Aula 7/ ) to monitor your therapy progress and share the data with your healthcare team when you discuss your sleep apnea.                                                    Know your equipment:  CPAP is continuous positive airway pressure that prevents obstructive sleep apnea by keeping the throat from collapsing while you are sleeping. In most cases, the device is  smart  and can slowly self-adjusts if your throat collapses and keeps a record every day of how well you are treated-this information is available to you and your care team.  BPAP is bilevel positive airway pressure that keeps your throat open and also assists each breath with a pressure boost to maintain adequate breathing.  Special kinds of BPAP are used in patients who have inadequate breathing from lung or heart disease. In most cases, the device is  smart  and can slowly self-adjusts to assist breathing. Like CPAP, the device keeps a record of how well you are treated.  Your mask is your connection to the device. You get to choose what feels most comfortable and the staff will help to make sure if fits. Here: are some examples of the different masks that are available: Magnetic mask aids may assist with use but there are safety issues that should be addressed when  considering with magnets* ( see end of discussion).       Key points to remember on your journey with sleep apnea:  Sleep study.  PAP devices often need to be adjusted during a sleep study to show that they are effective and adjusted right.  Good tips to remember: Try wearing just the mask during a quiet time during the day so your body adapts to wearing it. A humidifier is recommended for comfort in most cases to prevent drying of your nose and throat. Allergy medication from your provider may help you if you are having nasal congestion.  Getting settled-in. It takes more than one night for most of us to get used to wearing a mask. Try wearing just the mask during a quiet time during the day so your body adapts to wearing it. A humidifier is recommended for comfort in most cases. Our team will work with you carefully on the first day and will be in contact within 4 days and again at 2 and 4 weeks for advice and remote device adjustments. Your therapy is evaluated by the device each day.   Use it every night. The more you are able to sleep naturally for 7-8 hours, the more likely you will have good sleep and to prevent health risks or symptoms from sleep apnea. Even if you use it 4 hours it helps. Occasionally all of us are unable to use a medical therapy, in sleep apnea, it is not dangerous to miss one night.   Communicate. Call our skilled team on the number provided on the first day if your visit for problems that make it difficult to wear the device. Over 2 out of 3 patients can learn to wear the device long-term with help from our team. Remember to call our team or your sleep providers if you are unable to wear the device as we may have other solutions for those who cannot adapt to mask CPAP therapy. It is recommended that you sleep your sleep provider within the first 3 months and yearly after that if you are not having problems.   Use it for your health. We encourage use of CPAP masks during daytime quiet  periods to allow your face and brain to adapt to the sensation of CPAP so that it will be a more natural sensation to awaken to at night or during naps. This can be very useful during the first few weeks or months of adapting to CPAP though it does not help medically to wear CPAP during wakefulness and  should not be used as a strategy just to meet guidelines.  Take care of your equipment. Make sure you clean your mask and tubing using directions every day and that your filter and mask are replaced as recommended or if they are not working.     *Masks with magnets:  Updated Contraindications  Masks with magnetic components are contraindicated for use by patients where they, or anyone in close physical contact while using the mask, have the following:   Active medical implants that interact with magnets (i.e., pacemakers, implantable cardioverter defibrillators (ICD), neurostimulators, cerebrospinal fluid (CSF) shunts, insulin/infusion pumps)   Metallic implants/objects containing ferromagnetic material (i.e., aneurysm clips/flow disruption devices, embolic coils, stents, valves, electrodes, implants to restore hearing or balance with implanted magnets, ocular implants, metallic splinters in the eye)  Updated Warning  Keep the mask magnets at a safe distance of at least 6 inches (150 mm) away from implants or medical devices that may be adversely affected by magnetic interference. This warning applies to you or anyone in close physical contact with your mask. The magnets are in the frame and lower headgear clips, with a magnetic field strength of up to 400mT. When worn, they connect to secure the mask but may inadvertently detach while asleep.  Implants/medical devices, including those listed within contraindications, may be adversely affected if they change function under external magnetic fields or contain ferromagnetic materials that attract/repel to magnetic fields (some metallic implants, e.g., contact lenses  with metal, dental implants, metallic cranial plates, screws, edu hole covers, and bone substitute devices). Consult your physician and  of your implant / other medical device for information on the potential adverse effects of magnetic fields.    BESIDES CPAP, WHAT OTHER THERAPIES ARE THERE?    Positioning Device  Positioning devices are generally used when sleep apnea is mild and only occurs on your back.This example shows a pillow that straps around the waist. It may be appropriate for those whose sleep study shows milder sleep apnea that occurs primarily when lying flat on one's back. Preliminary studies have shown benefit but effectiveness at home may need to be verified by a home sleep test. These devices are generally not covered by medical insurance.  Examples of devices that maintain sleeping on the back to prevent snoring and mild sleep apnea.    Belt type body positioner  http://GeoPalz/    Electronic reminder  http://nightshifttherapy.CleanEdison/            Oral Appliance  What is oral appliance therapy?  An oral appliance device fits on your teeth at night like a retainer used after having braces. The device is made by a specialized dentist and requires several visits over 1-2 months before a manufactured device is made to fit your teeth and is adjusted to prevent your sleep apnea. Once an oral device is working properly, snoring should be improved. A home sleep test may be recommended at that time if to determine whether the sleep apnea is adequately treated.       Some things to remember:  -Oral devices are often, but not always, covered by your medical insurance. Be sure to check with your insurance provider.   -If you are referred for oral therapy, you will be given a list of specialized dentists to consider or you may choose to visit the Web site of the American Academy of Dental Sleep Medicine  -Oral devices are less likely to work if you have severe sleep apnea or are extremely  overweight.     More detailed information  An oral appliance is a small acrylic device that fits over the upper and lower teeth  (similar to a retainer or a mouth guard). This device slightly moves jaw forward, which moves the base of the tongue forward, opens the airway, improves breathing for effective treat snoring and obstructive sleep apnea in perhaps 7 out of 10 people .  The best working devices are custom-made by a dental device  after a mold is made of the teeth 1, 2, 3.  When is an oral appliance indicated?  Oral appliance therapy is recommended as a first-line treatment for patients with primary snoring, mild sleep apnea, and for patients with moderate sleep apnea who prefer appliance therapy to use of CPAP4, 5. Severity of sleep apnea is determined by sleep testing and is based on the number of respiratory events per hour of sleep.   How successful is oral appliance therapy?  The success rate of oral appliance therapy in patients with mild sleep apnea is 75-80% while in patients with moderate sleep apnea it is 50-70%. The chance of success in patients with severe sleep apnea is 40-50%. The research also shows that oral appliances have a beneficial effect on the cardiovascular health of CONCHA patients at the same magnitude as CPAP therapy7.  Oral appliances should be a second-line treatment in cases of severe sleep apnea, but if not completely successful then a combination therapy utilizing CPAP plus oral appliance therapy may be effective. Oral appliances tend to be effective in a broad range of patients although studies show that the patients who have the highest success are females, younger patients, those with milder disease, and less severe obesity. 3, 6.   Finding a dentist that practices dental sleep medicine  Specific training is available through the American Academy of Dental Sleep Medicine for dentists interested in working in the field of sleep. To find a dentist who is educated in  the field of sleep and the use of oral appliances, near you, visit the Web site of the American Academy of Dental Sleep Medicine.    References  1. Suri, et al. Objectively measured vs self-reported compliance during oral appliance therapy for sleep-disordered breathing. Chest 2013; 144(5): 2454-3520.  2. Wendy et al. Objective measurement of compliance during oral appliance therapy for sleep-disordered breathing. Thorax 2013; 68(1): 91-96.  3. Porfirio et al. Mandibular advancement devices in 620 men and women with CONCHA and snoring: tolerability and predictors of treatment success. Chest 2004; 125: 4437-2315.  4. Yvonne, et al. Oral appliances for snoring and CONCHA: a review. Sleep 2006; 29: 244-262.  5. Lu et al. Oral appliance treatment for CONCHA: an update. J Clin Sleep Med 2014; 10(2): 215-227.  6. Cameron et al. Predictors of OSAH treatment outcome. J Dent Res 2007; 86: 5307-9946.      Weight Loss:   Your Body mass index is 29.58 kg/m .    Being overweight does not necessarily mean you will have health consequences.  Those who have BMI over 35 or over 27 with existing medical conditions carries greater risk.   Weight loss decreases severity of sleep apnea in most people with obesity. For those with mild obesity who have developed snoring with weight gain, even 15-30 pound weight loss can improve and occasionally milder eliminate sleep apnea.  Structured and life-long dietary and health habits are necessary to lose weight and keep healthier weight levels.     The Comprehensive Weight loss program offers all aspects of weight loss strategies including two Non-Surgical Weight Loss Programs: Medical Weight Management and our 24 Week Healthy Lifestyle Program:    Medical Weight Management: You will meet with a Medical Weight Management Provider, as well as a Registered Dietician. The program may include medication therapy, dietary education, recommended exercise and physical therapy programs,  monthly support group meetings, and possible psychological counseling. Follow up visits with the provider or dietician are scheduled based on your progress and needs.    24 Week Healthy Lifestyle Program: This unique program is designed to give you the support of weekly appointments and activities thru a 24-week period. It may include all of the components of the basic program (above), with the addition of 11 individual Health  Visits, 24-week access to the [employer] website for over 700 online classes, and monthly support group meetings. This program has an out-of-pocket expense of $499 to cover the items that can not be billed to insurance (health coaches and [employer] access), and is non-refundable/non-transferable (you may be able to use a Health Savings Account; ask your HSA provider). There may be an optional meal replacement plan prescribed as well.   Surgical management achieves meaningful long-term weight loss and improvement in health risks in most patients with more severe obesity.      Sleep Apnea Surgery:    Surgery for obstructive sleep apnea is considered generally only when other therapies fail to work. Surgery may be discussed with you if you are having a difficult time tolerating CPAP and or when there is an abnormal structure that requires surgical correction.  Nose and throat surgeries often enlarge the airway to prevent collapse.  Most of these surgeries create pain for 1-2 weeks and up to half of the most common surgeries are not effective throughout life.  You should carefully discuss the benefits and drawbacks to surgery with your sleep provider and surgeon to determine if it is the best solution for you.   More information  Surgery for CONCHA is directed at areas that are responsible for narrowing or complete obstruction of the airway during sleep.  There are a wide range of procedures available to enlarge and/or stabilize the airway to prevent blockage of breathing in the three major  areas where it can occur: the palate, tongue, and nasal regions.  Successful surgical treatment depends on the accurate identification of the factors responsible for obstructive sleep apnea in each person.  A personalized approach is required because there is no single treatment that works well for everyone.  Because of anatomic variation, consultation with an examination by a sleep surgeon is a critical first step in determining what surgical options are best for each patient.  In some cases, examination during sedation may be recommended in order to guide the selection of procedures.  Patients will be counseled about risks and benefits as well as the typical recovery course after surgery. Surgery is typically not a cure for a person s CONCHA.  However, surgery will often significantly improve one s CONCHA severity (termed  success rate ).  Even in the absence of a cure, surgery will decrease the cardiovascular risk associated with OSA7; improve overall quality of life8 (sleepiness, functionality, sleep quality, etc).      Palate Procedures:  Patients with CONCHA often have narrowing of their airway in the region of their tonsils and uvula.  The goals of palate procedures are to widen the airway in this region as well as to help the tissues resist collapse.  Modern palate procedure techniques focus on tissue conservation and soft tissue rearrangement, rather than tissue removal.  Often the uvula is preserved in this procedure. Residual sleep apnea is common in patient after pharyngoplasty with an average reduction in sleep apnea events of 33%2.      Tongue Procedures:  ExamWhile patients are awake, the muscles that surround the throat are active and keep this region open for breathing. These muscles relax during sleep, allowing the tongue and other structures to collapse and block breathing.  There are several different tongue procedures available.  Selection of a tongue base procedure depends on characteristics seen on  physical exam.  Generally, procedures are aimed at removing bulky tissues in this area or preventing the back of the tongue from falling back during sleep.  Success rates for tongue surgery range from 50-62%3.    Hypoglossal Nerve Stimulation:  Hypoglossal nerve stimulation has recently received approval from the [place] Food and Drug Administration for the treatment of obstructive sleep apnea.  This is based on research showing that the system was safe and effective in treating sleep apnea6.  Results showed that the median AHI score decreased 68%, from 29.3 to 9.0. This therapy uses an implant system that senses breathing patterns and delivers mild stimulation to airway muscles, which keeps the airway open during sleep.  The system consists of three fully implanted components: a small generator (similar in size to a pacemaker), a breathing sensor, and a stimulation lead.  Using a small handheld remote, a patient turns the therapy on before bed and off upon awakening.    Candidates for this device must be greater than 18 years of age, have moderate to severe obstructive sleep apnea with less than 25% central events  (AHI between 15-65), BMI less than 35, have tried CPAP/oral appliance for at least 8 weeks without success, and have appropriate upper airway anatomy (determined by a sleep endoscopy performed by Dr. Guido Philip or Dr. Wesly Barajas).    Nasal Procedures:  Nasal obstruction can interfere with nasal breathing during the day and night.  Studies have shown that relief of nasal obstruction can improve the ability of some patients to tolerate positive airway pressure therapy for obstructive sleep apnea1.  Treatment options include medications such as nasal saline, topical corticosteroid and antihistamine sprays, and oral medications such as antihistamines or decongestants. Non-surgical treatments can include external nasal dilators for selected patients. If these are not successful by themselves,  surgery can improve the nasal airway either alone or in combination with these other options.        Combination Procedures:  Combination of surgical procedures and other treatments may be recommended, particularly if patients have more than one area of narrowing or persistent positional disease.  The success rate of combination surgery ranges from 66-80%2,3.    References  Marcela CHAWLA. The Role of the Nose in Snoring and Obstructive Sleep Apnoea: An Update.  Eur Arch Otorhinolaryngol. 2011; 268: 1365-73.   Bud SM; Catarina JA; Francisco J JR; Pallanch JF; Jordana MB; Niels SG; Galileo AMBROSIO. Surgical modifications of the upper airway for obstructive sleep apnea in adults: a systematic review and meta-analysis. SLEEP 2010;33(10):1542-7996. Joe BROOKS. Hypopharyngeal surgery in obstructive sleep apnea: an evidence-based medicine review.  Arch Otolaryngol Head Neck Surg. 2006 Feb;132(2):206-13.  Reno YH1, Broderick Y, Nolan JANESSA. The efficacy of anatomically based multilevel surgery for obstructive sleep apnea. Otolaryngol Head Neck Surg. 2003 Oct;129(4):327-35.  Joe BROOKS, Goldberg A. Hypopharyngeal Surgery in Obstructive Sleep Apnea: An Evidence-Based Medicine Review. Arch Otolaryngol Head Neck Surg. 2006 Feb;132(2):206-13.  Ivania GARCIA et al. Upper-Airway Stimulation for Obstructive Sleep Apnea.  N Engl J Med. 2014 Jan 9;370(2):139-49.  Rodolfo Y et al. Increased Incidence of Cardiovascular Disease in Middle-aged Men with Obstructive Sleep Apnea. Am J Respir Crit Care Med; 2002 166: 159-165  Reynolds EM et al. Studying Life Effects and Effectiveness of Palatopharyngoplasty (SLEEP) study: Subjective Outcomes of Isolated Uvulopalatopharyngoplasty. Otolaryngol Head Neck Surg. 2011; 144: 623-631.        WHAT IF I ONLY HAVE SNORING?    Mandibular advancement devices, lateral sleep positioning, long-term weight loss and treatment of nasal allergies have been shown to improve snoring.  Exercising tongue muscles with a game  (https://[employer].Accelereach/us/girish/soundly-reduce-snoring/vw8874165787) or stimulating the tongue during the day with a device (https://doi.org/10.3390/mug38587141) have improved snoring in some individuals.  https://www.[place]annah.Accelereach/  https://www.sleepfoundation.org/best-anti-snoring-mouthpieces-and-mouthguards    Remember to Drive Safe... Drive Alive     Sleep health profoundly affects your health, mood, and your safety.  Thirty three percent of the population (one in three of us) is not getting enough sleep and many have a sleep disorder. Not getting enough sleep or having an untreated / undertreated sleep condition may make us sleepy without even knowing it. In fact, our driving could be dramatically impaired due to our sleep health. As your provider, here are some things I would like you to know about driving:     Here are some warning signs for impairment and dangerous drowsy driving:              -Having been awake more than 16 hours               -Looking tired               -Eyelid drooping              -Head nodding (it could be too late at this point)              -Driving for more than 30 minutes     Some things you could do to make the driving safer if you are experiencing some drowsiness:              -Stop driving and rest              -Call for transportation              -Make sure your sleep disorder is adequately treated     Some things that have been shown NOT to work when experiencing drowsiness while driving:              -Turning on the radio              -Opening windows              -Eating any  distracting  /  entertaining  foods (e.g., sunflower seeds, candy, or any other)              -Talking on the phone      Your decision may not only impact your life, but also the life of others. Please, remember to drive safe for yourself and all of us.

## 2024-11-19 ENCOUNTER — HOSPITAL ENCOUNTER (EMERGENCY)
Facility: CLINIC | Age: 54
Discharge: HOME | End: 2024-11-19
Attending: EMERGENCY MEDICINE | Admitting: EMERGENCY MEDICINE
Payer: COMMERCIAL

## 2024-11-19 ENCOUNTER — APPOINTMENT (OUTPATIENT)
Dept: CT IMAGING | Facility: CLINIC | Age: 54
End: 2024-11-19
Attending: EMERGENCY MEDICINE
Payer: COMMERCIAL

## 2024-11-19 ENCOUNTER — NURSE TRIAGE (OUTPATIENT)
Dept: PEDIATRICS | Facility: CLINIC | Age: 54
End: 2024-11-19
Payer: COMMERCIAL

## 2024-11-19 VITALS
BODY MASS INDEX: 29.66 KG/M2 | HEART RATE: 81 BPM | OXYGEN SATURATION: 96 % | WEIGHT: 223.77 LBS | DIASTOLIC BLOOD PRESSURE: 102 MMHG | TEMPERATURE: 97.9 F | HEIGHT: 73 IN | RESPIRATION RATE: 18 BRPM | SYSTOLIC BLOOD PRESSURE: 151 MMHG

## 2024-11-19 DIAGNOSIS — R20.2 PARESTHESIAS: ICD-10-CM

## 2024-11-19 DIAGNOSIS — I10 HYPERTENSION, UNSPECIFIED TYPE: ICD-10-CM

## 2024-11-19 DIAGNOSIS — R07.9 CHEST PAIN, UNSPECIFIED TYPE: ICD-10-CM

## 2024-11-19 LAB
ANION GAP SERPL CALCULATED.3IONS-SCNC: 15 MMOL/L (ref 7–15)
BASOPHILS # BLD AUTO: 0 10E3/UL (ref 0–0.2)
BASOPHILS NFR BLD AUTO: 1 %
BUN SERPL-MCNC: 9.5 MG/DL (ref 6–20)
CALCIUM SERPL-MCNC: 9.2 MG/DL (ref 8.8–10.4)
CHLORIDE SERPL-SCNC: 102 MMOL/L (ref 98–107)
CREAT SERPL-MCNC: 0.97 MG/DL (ref 0.67–1.17)
D DIMER PPP FEU-MCNC: 0.69 UG/ML FEU (ref 0–0.5)
EGFRCR SERPLBLD CKD-EPI 2021: >90 ML/MIN/1.73M2
EOSINOPHIL # BLD AUTO: 0.1 10E3/UL (ref 0–0.7)
EOSINOPHIL NFR BLD AUTO: 2 %
ERYTHROCYTE [DISTWIDTH] IN BLOOD BY AUTOMATED COUNT: 12.3 % (ref 10–15)
GLUCOSE SERPL-MCNC: 105 MG/DL (ref 70–99)
HCO3 SERPL-SCNC: 24 MMOL/L (ref 22–29)
HCT VFR BLD AUTO: 51.4 % (ref 40–53)
HGB BLD-MCNC: 17.2 G/DL (ref 13.3–17.7)
IMM GRANULOCYTES # BLD: 0 10E3/UL
IMM GRANULOCYTES NFR BLD: 0 %
LYMPHOCYTES # BLD AUTO: 2.2 10E3/UL (ref 0.8–5.3)
LYMPHOCYTES NFR BLD AUTO: 32 %
MCH RBC QN AUTO: 29.2 PG (ref 26.5–33)
MCHC RBC AUTO-ENTMCNC: 33.5 G/DL (ref 31.5–36.5)
MCV RBC AUTO: 87 FL (ref 78–100)
MONOCYTES # BLD AUTO: 0.6 10E3/UL (ref 0–1.3)
MONOCYTES NFR BLD AUTO: 9 %
NEUTROPHILS # BLD AUTO: 3.9 10E3/UL (ref 1.6–8.3)
NEUTROPHILS NFR BLD AUTO: 57 %
NRBC # BLD AUTO: 0 10E3/UL
NRBC BLD AUTO-RTO: 0 /100
PLATELET # BLD AUTO: 305 10E3/UL (ref 150–450)
POTASSIUM SERPL-SCNC: 3.8 MMOL/L (ref 3.4–5.3)
RBC # BLD AUTO: 5.9 10E6/UL (ref 4.4–5.9)
SODIUM SERPL-SCNC: 141 MMOL/L (ref 135–145)
TROPONIN T SERPL HS-MCNC: <6 NG/L
WBC # BLD AUTO: 6.8 10E3/UL (ref 4–11)

## 2024-11-19 PROCEDURE — 85004 AUTOMATED DIFF WBC COUNT: CPT | Performed by: EMERGENCY MEDICINE

## 2024-11-19 PROCEDURE — 85014 HEMATOCRIT: CPT | Performed by: EMERGENCY MEDICINE

## 2024-11-19 PROCEDURE — 85379 FIBRIN DEGRADATION QUANT: CPT | Performed by: EMERGENCY MEDICINE

## 2024-11-19 PROCEDURE — 85041 AUTOMATED RBC COUNT: CPT | Performed by: EMERGENCY MEDICINE

## 2024-11-19 PROCEDURE — 84484 ASSAY OF TROPONIN QUANT: CPT | Performed by: EMERGENCY MEDICINE

## 2024-11-19 PROCEDURE — 93005 ELECTROCARDIOGRAM TRACING: CPT

## 2024-11-19 PROCEDURE — 36415 COLL VENOUS BLD VENIPUNCTURE: CPT | Performed by: EMERGENCY MEDICINE

## 2024-11-19 PROCEDURE — 80048 BASIC METABOLIC PNL TOTAL CA: CPT | Performed by: EMERGENCY MEDICINE

## 2024-11-19 PROCEDURE — 250N000009 HC RX 250: Performed by: EMERGENCY MEDICINE

## 2024-11-19 PROCEDURE — 82310 ASSAY OF CALCIUM: CPT | Performed by: EMERGENCY MEDICINE

## 2024-11-19 PROCEDURE — 99285 EMERGENCY DEPT VISIT HI MDM: CPT | Mod: 25

## 2024-11-19 PROCEDURE — 250N000011 HC RX IP 250 OP 636: Performed by: EMERGENCY MEDICINE

## 2024-11-19 PROCEDURE — 71275 CT ANGIOGRAPHY CHEST: CPT

## 2024-11-19 RX ORDER — NIRMATRELVIR AND RITONAVIR 300-100 MG
KIT ORAL
COMMUNITY
Start: 2024-11-16 | End: 2024-11-21

## 2024-11-19 RX ORDER — IOPAMIDOL 755 MG/ML
500 INJECTION, SOLUTION INTRAVASCULAR ONCE
Status: COMPLETED | OUTPATIENT
Start: 2024-11-19 | End: 2024-11-19

## 2024-11-19 RX ADMIN — IOPAMIDOL 83 ML: 755 INJECTION, SOLUTION INTRAVENOUS at 23:00

## 2024-11-19 RX ADMIN — SODIUM CHLORIDE 100 ML: 9 INJECTION, SOLUTION INTRAVENOUS at 23:00

## 2024-11-19 ASSESSMENT — COLUMBIA-SUICIDE SEVERITY RATING SCALE - C-SSRS
1. IN THE PAST MONTH, HAVE YOU WISHED YOU WERE DEAD OR WISHED YOU COULD GO TO SLEEP AND NOT WAKE UP?: NO
6. HAVE YOU EVER DONE ANYTHING, STARTED TO DO ANYTHING, OR PREPARED TO DO ANYTHING TO END YOUR LIFE?: NO
2. HAVE YOU ACTUALLY HAD ANY THOUGHTS OF KILLING YOURSELF IN THE PAST MONTH?: NO

## 2024-11-19 ASSESSMENT — ACTIVITIES OF DAILY LIVING (ADL)
ADLS_ACUITY_SCORE: 0
ADLS_ACUITY_SCORE: 0

## 2024-11-19 NOTE — TELEPHONE ENCOUNTER
RN contacted patient at [phone]. Relayed provider recommendation below. Patient reports he has not been taking flomax for months. Patient verbalized understanding and agreement in plan.  Shannon Jones RN

## 2024-11-19 NOTE — TELEPHONE ENCOUNTER
Nurse Triage SBAR    Is this a 2nd Level Triage? YES, LICENSED PRACTITIONER REVIEW IS REQUIRED    Situation: elevated BP     Background: Covid + on Saturday. Did an online visit through his insurance and was started Paxlovid on Sunday. Has 5 doses left, will finish on Thursday.     Assessment: When he started Paxlovid he was told to stop his cholesterol medication and he wasn't sure if he was supposed to stop his BP medicine as well. He decided to hold it anyway,   Most recent BP is 145/97 this morning. He decided to resume his amlodipine this morning.   Patient asking if his BP can be taken manually since its more accurate, not sure if he trusts his machine.     Patient also notes possible blurry vision this morning, says its fine right now. Patient went back and forth on whether this was a new change or not, if it was one or both eyes. Hard to get concrete details on that.     Writer also noted Amlodipine has an interaction with Paxlovid in the RN protocol.     Protocol Recommended Disposition:   See in Office Within 2 Weeks, See in Office Today    Recommendation: Routing to provider to review and advise.   If needs manual BP check (but is covid+)?  If he just needs to resume his BP medicine and monitor BP at home?  Or ER for blurry vision even though BP is not extremely high?    MIKE RIVERS RN on 11/19/2024 at 10:37 AM   [facility]         Does the patient meet one of the following criteria for ADS visit consideration? 16+ years old, with an [facility] PCP     TIP  Providers, please consider if this condition is appropriate for management at one of our Acute and Diagnostic Services sites.     If patient is a good candidate, please use dotphrase <dot>triageresponse and select Refer to ADS to document.     Patient stated an understanding and agreed with plan.     MIKE RIVERS RN on 11/19/2024 at 10:27 AM   [facility]         Reason for Disposition   Patient wants to be seen    "Systolic BP >= 130 OR Diastolic >= 80, and is not taking BP medications    Additional Information   Negative: Sounds like a life-threatening emergency to the triager   Negative: Symptom is main concern (e.g., headache, chest pain)   Negative: Low blood pressure is main concern   Negative: Systolic BP >= 160 OR Diastolic >= 100, and any cardiac (e.g., breathing difficulty, chest pain) or neurologic symptoms (e.g., new-onset blurred or double vision)   Negative: Pregnant 20 or more weeks (or postpartum < 6 weeks) with new hand or face swelling   Negative: Pregnant 20 or more weeks (or postpartum < 6 weeks) and Systolic BP >= 160 OR Diastolic >= 110   Negative: Patient sounds very sick or weak to the triager   Negative: Systolic BP >= 200 OR Diastolic >= 120 and having NO cardiac or neurologic symptoms   Negative: Pregnant 20 or more weeks (or postpartum < 6 weeks) with Systolic BP >= 140 OR Diastolic >= 90   Negative: Systolic BP >= 180 OR Diastolic >= 110, and missed most recent dose of blood pressure medication   Negative: Systolic BP >= 180 OR Diastolic >= 110   Negative: Ran out of BP medications   Negative: Taking BP medications and feels is having side effects (e.g., impotence, cough, dizziness)   Negative: Systolic BP >= 160 OR Diastolic >= 100   Negative: Systolic BP >= 130 OR Diastolic >= 80, and pregnant    Answer Assessment - Initial Assessment Questions  1. BLOOD PRESSURE: \"What is the blood pressure?\" \"Did you take at least two measurements 5 minutes apart?\"            Sunday afternoon 133/86            Monday am - 140/90 - pm 140/88            Tuesday am- 145/97 - resumed BP medicine this am    Not sure I trust machine - manually - its better than machine. Lower manually.     2. ONSET: \"When did you take your blood pressure?\"      Started paxlovid on Sunday   On cholesterol and BP medicine told to stop the cholesterol medicine but I stopped taking both    3. HOW: \"How did you take your blood pressure?\" " "(e.g., automatic home BP monitor, visiting nurse)      Home BP cuff 7am    4. HISTORY: \"Do you have a history of high blood pressure?\"      On amlodipine 5 mg daily    5. MEDICINES: \"Are you taking any medicines for blood pressure?\" \"Have you missed any doses recently?\"      Yes - last dose was Saturday night, just resumed this morning.     6. OTHER SYMPTOMS: \"Do you have any symptoms?\" (e.g., blurred vision, chest pain, difficulty breathing, headache, weakness)      Covid symptoms - today - a little bit of blurry vision - distant. Not too bad, last half hour, both eyes, numbness in toes. Right now its fine. Fog. Covid or what.     7. PREGNANCY: \"Is there any chance you are pregnant?\" \"When was your last menstrual period?\"      N/a    Protocols used: Blood Pressure - High-A-OH    "

## 2024-11-19 NOTE — TELEPHONE ENCOUNTER
Recommend:  1)can take amlodipine - reduce amlodipine to 1/2 tab (2.5mg) daily until 3 days after paxlovid completed.  2) stop tamsulosin until 3 days after paxlovid completed  3) no need to go to ER unless vision symptoms persist throughout the day or worsen

## 2024-11-20 ENCOUNTER — HOSPITAL ENCOUNTER (EMERGENCY)
Facility: CLINIC | Age: 54
Discharge: HOME | End: 2024-11-20
Attending: EMERGENCY MEDICINE | Admitting: EMERGENCY MEDICINE
Payer: COMMERCIAL

## 2024-11-20 ENCOUNTER — PATIENT OUTREACH (OUTPATIENT)
Dept: CARE COORDINATION | Facility: CLINIC | Age: 54
End: 2024-11-20
Payer: COMMERCIAL

## 2024-11-20 VITALS
SYSTOLIC BLOOD PRESSURE: 139 MMHG | RESPIRATION RATE: 18 BRPM | DIASTOLIC BLOOD PRESSURE: 92 MMHG | OXYGEN SATURATION: 98 % | TEMPERATURE: 97.8 F | HEART RATE: 88 BPM

## 2024-11-20 DIAGNOSIS — G44.209 ACUTE NON INTRACTABLE TENSION-TYPE HEADACHE: ICD-10-CM

## 2024-11-20 DIAGNOSIS — U07.1 COVID-19: ICD-10-CM

## 2024-11-20 LAB
ATRIAL RATE - MUSE: 74 BPM
DIASTOLIC BLOOD PRESSURE - MUSE: NORMAL MMHG
INTERPRETATION ECG - MUSE: NORMAL
P AXIS - MUSE: 31 DEGREES
PR INTERVAL - MUSE: 160 MS
QRS DURATION - MUSE: 106 MS
QT - MUSE: 418 MS
QTC - MUSE: 463 MS
R AXIS - MUSE: -25 DEGREES
SYSTOLIC BLOOD PRESSURE - MUSE: NORMAL MMHG
T AXIS - MUSE: 16 DEGREES
VENTRICULAR RATE- MUSE: 74 BPM

## 2024-11-20 PROCEDURE — 96374 THER/PROPH/DIAG INJ IV PUSH: CPT

## 2024-11-20 PROCEDURE — 250N000013 HC RX MED GY IP 250 OP 250 PS 637: Performed by: EMERGENCY MEDICINE

## 2024-11-20 PROCEDURE — 250N000011 HC RX IP 250 OP 636: Performed by: EMERGENCY MEDICINE

## 2024-11-20 PROCEDURE — 99284 EMERGENCY DEPT VISIT MOD MDM: CPT | Mod: 25

## 2024-11-20 PROCEDURE — 96375 TX/PRO/DX INJ NEW DRUG ADDON: CPT

## 2024-11-20 RX ORDER — KETOROLAC TROMETHAMINE 15 MG/ML
15 INJECTION, SOLUTION INTRAMUSCULAR; INTRAVENOUS ONCE
Status: COMPLETED | OUTPATIENT
Start: 2024-11-20 | End: 2024-11-20

## 2024-11-20 RX ORDER — METOCLOPRAMIDE HYDROCHLORIDE 5 MG/ML
5 INJECTION INTRAMUSCULAR; INTRAVENOUS ONCE
Status: COMPLETED | OUTPATIENT
Start: 2024-11-20 | End: 2024-11-20

## 2024-11-20 RX ORDER — ACETAMINOPHEN 500 MG
1000 TABLET ORAL ONCE
Status: COMPLETED | OUTPATIENT
Start: 2024-11-20 | End: 2024-11-20

## 2024-11-20 RX ADMIN — ACETAMINOPHEN 1000 MG: 500 TABLET, FILM COATED ORAL at 04:25

## 2024-11-20 RX ADMIN — KETOROLAC TROMETHAMINE 15 MG: 15 INJECTION, SOLUTION INTRAMUSCULAR; INTRAVENOUS at 04:24

## 2024-11-20 RX ADMIN — METOCLOPRAMIDE 5 MG: 5 INJECTION, SOLUTION INTRAMUSCULAR; INTRAVENOUS at 04:25

## 2024-11-20 ASSESSMENT — COLUMBIA-SUICIDE SEVERITY RATING SCALE - C-SSRS
6. HAVE YOU EVER DONE ANYTHING, STARTED TO DO ANYTHING, OR PREPARED TO DO ANYTHING TO END YOUR LIFE?: NO
1. IN THE PAST MONTH, HAVE YOU WISHED YOU WERE DEAD OR WISHED YOU COULD GO TO SLEEP AND NOT WAKE UP?: NO
2. HAVE YOU ACTUALLY HAD ANY THOUGHTS OF KILLING YOURSELF IN THE PAST MONTH?: NO

## 2024-11-20 ASSESSMENT — ACTIVITIES OF DAILY LIVING (ADL)
ADLS_ACUITY_SCORE: 0
ADLS_ACUITY_SCORE: 0

## 2024-11-20 NOTE — ED TRIAGE NOTES
Here for headache associated with nausea. Was seen here yesterday for chest pain and hypertension, got blood test and CT scan that was normal. Patient was discharge around midnight, went to sleep and woke up with headache. Stated is positive for Covid with home test on Saturday. ABCs intact.      Triage Assessment (Adult)       Row Name 11/20/24 0353          Triage Assessment    Airway WDL WDL        Respiratory WDL    Respiratory WDL WDL        Cardiac WDL    Cardiac WDL WDL

## 2024-11-20 NOTE — PROGRESS NOTES
[facility]    Background: Primary Care-Care Coordination initial outreach identified per system criteria and reviewed by [facility] [place] team.    Assessment: Upon chart review, [facility] Team member will not proceed with patient outreach related to this episode of Primary Care-Care Coordination program due to reason below:    Patient has presented to Emergency Department, been readmitted to hospital, or transferred to another hospital.    Plan: Primary Care-Care Coordination episode addressed appropriately per reason noted above.      Rubia Stewart MA  [facility] [place], [facility]    *[facility] Care Resource Team does NOT follow patient ongoing. Referrals are identified based on internal discharge reports and the outreach is to ensure patient has an understanding of their discharge instructions.   Patient Education - Other

## 2024-11-20 NOTE — ED PROVIDER NOTES
Emergency Department Note      History of Present Illness     Chief Complaint   Headache      HPI   Manny Pitt is a 54 year old male who presents with a headache. The patient was seen in the ED yesterday for chest pain with a negative work up and was discharged around midnight. He went home and got some sleep, waking up with a slight headache. He did not take any medications for the pain. He denies any vision changes, vomiting, nausea.     Independent Historian   None    Review of External Notes   I reviewed the note from yesterday.    Past Medical History     Medical History and Problem List   GERD  Hernia   Hypertension   Personal history of other disorders of nervous system and sense organs  Unspecified eustachian tube disorder  Hyperlipidemia   Onychomycosis   Hemorrhoids   Squamous cell cancer   CONCHA    Medications   Amlodipine  Meclizine  Paxlovid  Pravastatin   Flomax     Surgical History   Hernia repair     Physical Exam     Patient Vitals for the past 24 hrs:   BP Temp Temp src Pulse Resp SpO2   11/20/24 0504 (!) 139/92 -- -- -- -- --   11/20/24 0354 (!) 155/101 97.8  F (36.6  C) Temporal 88 18 98 %     Physical Exam    HENT:  mmm, no rhinorrhea  Eyes: periorbital tissues and sclera normal , pupils equal  Neck: supple, no abnormal swelling, no meningismus  Lungs:  CTAB,  no resp distress  CV: rrr, no m/r/g, ppi  Abd: soft, nontender, nondistended, no rebound/masses/guarding/hsm  Ext: no peripheral edema  Skin: warm, dry, well perfused, no rashes/bruising/lesions on exposed skin  Neuro: alert, face symmetric, speech clear, 5 out of 5 motor bilateral upper and bilateral lower extremities, no gross motor or sensory deficits, gait stable  Psych: Normal mood, normal affect      Diagnostics     Lab Results   Labs Ordered and Resulted from Time of ED Arrival to Time of ED Departure - No data to display    Imaging   No orders to display       EKG       ED Course      Medications Administered   Medications    ketorolac (TORADOL) injection 15 mg (15 mg Intravenous $Given 11/20/24 0424)   metoclopramide (REGLAN) injection 5 mg (5 mg Intravenous $Given 11/20/24 0425)   acetaminophen (TYLENOL) tablet 1,000 mg (1,000 mg Oral $Given 11/20/24 0425)       Procedures   Procedures     Discussion of Management       ED Course   ED Course as of 11/20/24 0609   Wed Nov 20, 2024   0406 I initially assessed the patient and obtained the above history and physical exam.        Additional Documentation      Medical Decision Making / Diagnosis     CMS Diagnoses:     [facility]           MDM   Manny Pitt is a 54 year old male with known COVID symptoms diagnosed 4 days ago and he was previously in our emergency room 4 to 6 hours ago for evaluation of chest pain and shortness of breath.  Workup at that time was unremarkable for cardiopulmonary emergency.  Went home went to sleep for period time and then woke up with a headache that has been escalating.  No new falls trauma or fever.  No vision change, no neurologic deficit on examination.  He does not appear in any distress.  Low suspicion that there is acute CNS process present here I do not think he needs advanced imaging nor does he need lumbar puncture.  He did well with time and therapies here in the emergency department with resolution of his headache.  Improvement of blood pressure which was initially high.  Safe for discharge home at this point.  He is comfortable and agreeable with that plan.  Will return with new or worsening symptoms.    Disposition   The patient was discharged.     Diagnosis     ICD-10-CM    1. Acute non intractable tension-type headache  G44.209       2. COVID-19  U07.1            Discharge Medications   Discharge Medication List as of 11/20/2024  5:05 AM        Scribe Disclosure:  I, Venkat Melendez, am serving as a scribe at 4:11 AM on 11/20/2024 to document services personally performed by Isreal Munguia MD based on my observations and the  provider's statements to me.        Isreal Munguia MD  11/20/24 0609

## 2024-11-20 NOTE — ED PROVIDER NOTES
"  Emergency Department Note      History of Present Illness     Chief Complaint   Medication Reaction      HPI   Manny Pitt is a 54 year old male with a history of hypertension and hyperlipidemia who presents to the ED for evaluation of a medication reaction. The patient states he developed a fever, headaches, diarrhea, and generalized body aches about 4 days ago, took a at-home COVID test 3 days ago which was positive, and he called his insurance on-call prescribed. He was prescribed a 5 day course of Paxlovid and was advised to discontinue his statin and antihypertensive medications prior to starting the medication. He followed this and started Paxlovid 2 days ago. He has been taking his blood pressure at home and noticed it has been increasing with time. His headaches and diarrhea have persisted and developed right-sided chest pain around 1800 today. 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 Denies abdominal pain, new unilateral facial or extremity numbness/weakness/paresthesias, or vision changes.     Independent Historian   None    Review of External Notes   None    Past Medical History     Medical History and Problem List   HTN  BPH  Hemorrhoids  HLD  Onychomycosis  BCC  SCC  CONCHA  GERD  Abdominal hernia     Medications   Amlodipine  Meclizine  Paxlovid  Pravastatin   Flomax     Surgical History   Herniorrhaphy    Physical Exam     Patient Vitals for the past 24 hrs:   BP Temp Pulse Resp SpO2 Height Weight   11/19/24 2210 -- -- -- -- 97 % -- --   11/19/24 2205 (!) 130/103 -- 82 -- -- -- --   11/19/24 2114 (!) 149/99 -- -- -- -- -- --   11/19/24 2110 -- 97.9  F (36.6  C) 92 18 98 % -- --   11/19/24 2107 -- -- -- -- -- 1.854 m (6' 1\") 101.5 kg (223 lb 12.3 oz)     Physical Exam  General: Adult sitting upright  Eyes: PERRL, Conjunctive within normal limits.  EOMI.  ENT: Moist mucous membranes, oropharynx clear.   Neck: No rigidity.  CV: " Normal S1S2, no murmur, rub or gallop. Regular rate and rhythm  Resp: Clear to auscultation bilaterally, no wheezes, rales or rhonchi. Normal respiratory effort.  GI: Abdomen is soft, nontender and nondistended. No palpable masses. No rebound or guarding.  MSK: No chest wall tenderness.  No lower extremity edema. Nontender. Normal active range of motion.  Skin: Warm and dry. No rashes or vesicles or ecchymoses on chest wall  Neuro: Alert and oriented. Responds appropriately to all questions and commands. No focal findings appreciated. Normal muscle tone.  No facial asymmetry.  Speech is normal.  For  Psych: Normal mood and affect. Pleasant.     Diagnostics     Lab Results   Labs Ordered and Resulted from Time of ED Arrival to Time of ED Departure   BASIC METABOLIC PANEL - Abnormal       Result Value    Sodium 141      Potassium 3.8      Chloride 102      Carbon Dioxide (CO2) 24      Anion Gap 15      Urea Nitrogen 9.5      Creatinine 0.97      GFR Estimate >90      Calcium 9.2      Glucose 105 (*)    D DIMER QUANTITATIVE - Abnormal    D-Dimer Quantitative 0.69 (*)    TROPONIN T, HIGH SENSITIVITY - Normal    Troponin T, High Sensitivity <6     CBC WITH PLATELETS AND DIFFERENTIAL    WBC Count 6.8      RBC Count 5.90      Hemoglobin 17.2      Hematocrit 51.4      MCV 87      MCH 29.2      MCHC 33.5      RDW 12.3      Platelet Count 305      % Neutrophils 57      % Lymphocytes 32      % Monocytes 9      % Eosinophils 2      % Basophils 1      % Immature Granulocytes 0      NRBCs per 100 WBC 0      Absolute Neutrophils 3.9      Absolute Lymphocytes 2.2      Absolute Monocytes 0.6      Absolute Eosinophils 0.1      Absolute Basophils 0.0      Absolute Immature Granulocytes 0.0      Absolute NRBCs 0.0         Imaging   CT Chest Pulmonary Embolism w Contrast   Final Result   IMPRESSION:   No pulmonary embolus or other acute process in the chest.          EKG   ECG taken at 2141, ECG read at 2150  Normal sinus rhythm   No  significant change as compared to prior, dated 11/3/23.  Rate 74 bpm. UT interval 160 ms. QRS duration 106 ms. QT/QTc 418/463 ms. P-R-T axes 31 -25 16.    Independent Interpretation   None    ED Course      Medications Administered   Medications   sodium chloride for CT scan flush use (100 mLs Intravenous $Given 11/19/24 2300)   iopamidol (ISOVUE-370) solution 500 mL (83 mLs Intravenous $Given 11/19/24 2300)       Discussion of Management   None    ED Course   ED Course as of 11/19/24 2325 Tue Nov 19, 2024 2206 I obtained history and performed a physical exam as noted above.    2325 I rechecked and updated the patient.    Patient denies any new concerns.  I discussed all findings.  All questions were answered.    Additional Documentation  None    Medical Decision Making / Diagnosis     CMS Diagnoses: None    MIPS       CT for PE was ordered because the patient had an abnormal d-dimer.    WENDY Pitt is a 54 year old male with a history of hypertension and recent COVID infection currently on Paxlovid who presents emergency department with concerns for elevated blood pressure with associated intermittent lower extremity paresthesias which he notes as chronic, facial paresthesias which are bilateral, and right lower chest pain.  He has halved his blood pressure medications at the recommendation of primary care provider with use of Paxlovid and this is likely the cause for his mildly elevated blood pressure.  I do not suspect hypertensive emergency.  I do not suspect acute stroke given lack of any focal neurologic deficits and nonconcerning symptoms.  Chest pain is right-sided and atypical for ACS and he has no findings of acute ischemia or injury on ECG.  Troponins are normal.  D-dimer was ordered given recent COVID and concern for increased risk of pulmonary embolism and this did demonstrate mild elevation.  CT chest PE study was obtained and did not show evidence of acute chest pathology including  pulmonary embolism.  The patient had significant anxiety over his elevated blood pressures and is reassured that transient elevation is unlikely to be a cause for concern.  He feels as though he would like to discontinue the Paxlovid and this seems reasonable as he was initially placed on it to improve his symptoms so he could help with care for family member with cancer.  He is recommended to follow CDC guidelines for exposure to other people especially demonstrating precaution with anyone who is immunocompromised.  Patient is overall well appearance I feel comfortable discharging him home.  Again he was reassured with regards to the high blood pressure.  He will start his blood pressure medications at the recommendation of his primary care provider 3 days after discontinuing Paxlovid.  He will return to the emergency department worsening of symptoms.  Follow-up recommended with his primary care provider within 3 days with ongoing symptoms.  All questions were answered prior to discharge.    Disposition   The patient was discharged.     Diagnosis     ICD-10-CM    1. Chest pain, unspecified type  R07.9       2. Paresthesias  R20.2       3. Hypertension, unspecified type  I10              Scribe Disclosure:  I, Dee Webster, am serving as a scribe at 9:29 PM on 11/19/2024 to document services personally performed by Susana Bear MD based on my observations and the provider's statements to me.        Susana Bear MD  11/20/24 0218

## 2024-11-21 ENCOUNTER — OFFICE VISIT (OUTPATIENT)
Dept: PEDIATRICS | Facility: CLINIC | Age: 54
End: 2024-11-21
Payer: COMMERCIAL

## 2024-11-21 ENCOUNTER — OFFICE VISIT (OUTPATIENT)
Dept: INTERNAL MEDICINE | Facility: CLINIC | Age: 54
End: 2024-11-21
Payer: COMMERCIAL

## 2024-11-21 VITALS
DIASTOLIC BLOOD PRESSURE: 93 MMHG | TEMPERATURE: 98.2 F | WEIGHT: 224 LBS | HEART RATE: 82 BPM | OXYGEN SATURATION: 97 % | HEIGHT: 73 IN | SYSTOLIC BLOOD PRESSURE: 161 MMHG | RESPIRATION RATE: 20 BRPM | BODY MASS INDEX: 29.69 KG/M2

## 2024-11-21 VITALS
HEART RATE: 94 BPM | OXYGEN SATURATION: 95 % | SYSTOLIC BLOOD PRESSURE: 152 MMHG | RESPIRATION RATE: 18 BRPM | BODY MASS INDEX: 29.42 KG/M2 | DIASTOLIC BLOOD PRESSURE: 93 MMHG | WEIGHT: 223 LBS | TEMPERATURE: 98.7 F

## 2024-11-21 DIAGNOSIS — R22.42 LOCALIZED SWELLING OF LEFT LOWER LEG: Primary | ICD-10-CM

## 2024-11-21 DIAGNOSIS — I10 ESSENTIAL HYPERTENSION: ICD-10-CM

## 2024-11-21 DIAGNOSIS — M79.661 RIGHT CALF PAIN: Primary | ICD-10-CM

## 2024-11-21 DIAGNOSIS — M79.662 PAIN OF LEFT CALF: ICD-10-CM

## 2024-11-21 NOTE — PROGRESS NOTES
"Acute and Diagnostic Services Clinic Visit    Assessment & Plan     Pleasant albeit anxious patient with recent Covid infection and possible Paxlovid intolerance.  On questioning patient reports reviewing his ER lab results and noted a positive D-dimer and became concerned.    Given his intermittent calf pain and lower extremity symptoms he became concerned of a DVT.    We discussed the relative utility of a D-dimer blood test and the fact that his positive test is not terribly useful and may be attributed to age and recent COVID infection.    We also spent some time discussing his other ER results including CT scan.    Patient is discharged from ADS in stable condition.  He is offered copious reassurance.    Right calf pain  Intermittent and chronic.  We did proceed with ultrasound which is thankfully negative.  - US Lower Extremity Venous Duplex Bilateral    Pain of left calf  As above.  - US Lower Extremity Venous Duplex Bilateral    Essential hypertension  Slightly above goal.  The patient states is actually better than usual for him.  He is currently only on 2 and half milligrams of amlodipine.  He plans to increase to 5 mg beginning tomorrow.      30 minutes were spent doing chart review, history and exam, documentation and further activities per the note.       BMI  Estimated body mass index is 29.42 kg/m  as calculated from the following:    Height as of an earlier encounter on 11/21/24: 1.854 m (6' 1\").    Weight as of this encounter: 101.2 kg (223 lb).         No follow-ups on file.    Jose Lucero is a 54 year old, presenting for the following health issues:  Leg Pain (Bilateral leg pain X 2 days)    Pt is referred by IM clinic where he was scheduled for a post ER follow-up.  Patient presented to the emergency room just yesterday with a headache.  He also evaluation just 2 days ago for intermittent lower extremity paresthesias.  Elevated blood pressure.  Patient had recently been prescribed Paxlovid " for COVID.  Felt to be secondary to potential side effects of this.  He ER workup is otherwise reviewed.  He did have a positive D-dimer but a negative chest CT.    Today he complains of calf pain and pain behind the left knee.  He reports to me similar in the R calf.  He has known peripheral neuropathy.      He notes intermittent leg pains in general. Used to run a lot of marathons.      Admits to increased stress- mother recently dx with cancer.      HPI     Evaluation for possible DVT  Onset/Duration: X 2 days  Description:       Location: bilateral lower extremities        Redness: no        Pain: 2/10       Warmth: no        Joint swelling no   Progression of symptoms worse  Accompanying signs and symptoms:       Fevers: YES- previously with COVID       Numbness/tingling/weakness: YES- bilateral toes tingling and numbness       Chest pain/pleurisy: no        Shortness of breath: no   History        Trauma: no         Recent travel/when: no         Previous history of DVT: no         Family history of DVT: no         Recent surgery: no   Aggravating factors include: none  Therapies tried and outcome: nothing  Prior surgery on arteries of veins in this area: No              Objective    BP (!) 152/93 (BP Location: Right arm, Patient Position: Chair, Cuff Size: Adult Regular)   Pulse 94   Temp 98.7  F (37.1  C) (Oral)   Resp 18   Wt 101.2 kg (223 lb)   SpO2 95%   BMI 29.42 kg/m    Body mass index is 29.42 kg/m .  Physical Exam   GEN NAD  ENT MMM no lesions  EXT pedal pulses intact.  No particular edema.  B calf TTP- upper near popliteal fossa.    PSYCH anxious, cooperative.            Signed Electronically by: Albert Blank MD

## 2024-11-21 NOTE — PROGRESS NOTES
"  Assessment & Plan     Localized swelling of left lower leg  Patient presents to the clinic for a chief complaint of localized swelling and pain behind the left calf and knee.  Patient was recently emergency room where a D-dimer was drawn and it was slightly elevated.  They did a chest CT scan which was subsequently negative for pulmonary embolism.  Patient was started on Paxlovid for positive COVID-19 test.  Concern for blood clot in his left lower extremity.  Will refer patient to acute diagnostic services for further evaluation.  - Referral to Acute and Diagnostic Services (Day of diagnostic / First order acute); Future      20 minutes spent by me on the date of the encounter doing chart review, review of test results, interpretation of tests, patient visit, and documentation     MED REC REQUIRED  Post Medication Reconciliation Status: discharge medications reconciled, continue medications without change  BMI  Estimated body mass index is 29.55 kg/m  as calculated from the following:    Height as of this encounter: 1.854 m (6' 1\").    Weight as of this encounter: 101.6 kg (224 lb).   Weight management plan: Patient was referred to their PCP to discuss a diet and exercise plan.          Jose Lucero is a 54 year old, presenting for the following health issues:   Patient presents to the clinic for a post ER follow-up.  Patient presented to the emergency room just yesterday with a headache.  He also evaluation just 2 days ago for intermittent lower extremity paresthesias.  Elevated blood pressure.  Patient had recently been prescribed Paxlovid for COVID.  Felt to be secondary to potential side effects of this.  He ER workup is otherwise reviewed.  He did have a positive D-dimer but a negative chest CT.     Today he complains of calf pain and pain behind the left knee.  He reports to me similar in the R calf.  He has known peripheral neuropathy.       He notes intermittent leg pains in general. Used to run a lot " "of marathons.       Admits to increased stress- mother recently dx with cancer.          ER F/U    HPI       ED/UC Followup:    Facility:  [facility]   Date of visit: 11/20/24  Reason for visit: headache, COVID +  Current Status: stable, complains of pain behind left knee        Review of Systems  Constitutional, HEENT, cardiovascular, pulmonary, gi and gu systems are negative, except as otherwise noted.      Objective    BP (!) 161/93   Pulse 82   Temp 98.2  F (36.8  C)   Resp 20   Ht 1.854 m (6' 1\")   Wt 101.6 kg (224 lb)   SpO2 97%   BMI 29.55 kg/m    Body mass index is 29.55 kg/m .  Physical Exam   GENERAL: alert and no distress  CV: regular rates and rhythm, normal S1 S2, no S3 or S4, no murmur, click or rub, peripheral pulses strong, and slight swelling behind left knee and left calf   MS: 1+ edema to left calf             Signed Electronically by: HOLGER Raymond CNP    "

## 2024-11-24 ENCOUNTER — HOSPITAL ENCOUNTER (EMERGENCY)
Facility: CLINIC | Age: 54
Discharge: HOME | End: 2024-11-24
Attending: EMERGENCY MEDICINE | Admitting: EMERGENCY MEDICINE
Payer: COMMERCIAL

## 2024-11-24 ENCOUNTER — APPOINTMENT (OUTPATIENT)
Dept: GENERAL RADIOLOGY | Facility: CLINIC | Age: 54
End: 2024-11-24
Attending: EMERGENCY MEDICINE
Payer: COMMERCIAL

## 2024-11-24 VITALS
TEMPERATURE: 97.7 F | OXYGEN SATURATION: 98 % | HEIGHT: 74 IN | BODY MASS INDEX: 28.46 KG/M2 | DIASTOLIC BLOOD PRESSURE: 70 MMHG | RESPIRATION RATE: 16 BRPM | SYSTOLIC BLOOD PRESSURE: 138 MMHG | HEART RATE: 84 BPM | WEIGHT: 221.78 LBS

## 2024-11-24 DIAGNOSIS — U07.1 COVID-19 VIRUS INFECTION: ICD-10-CM

## 2024-11-24 PROCEDURE — 99284 EMERGENCY DEPT VISIT MOD MDM: CPT | Mod: 25

## 2024-11-24 PROCEDURE — 93005 ELECTROCARDIOGRAM TRACING: CPT

## 2024-11-24 PROCEDURE — 71045 X-RAY EXAM CHEST 1 VIEW: CPT

## 2024-11-24 ASSESSMENT — COLUMBIA-SUICIDE SEVERITY RATING SCALE - C-SSRS
2. HAVE YOU ACTUALLY HAD ANY THOUGHTS OF KILLING YOURSELF IN THE PAST MONTH?: NO
1. IN THE PAST MONTH, HAVE YOU WISHED YOU WERE DEAD OR WISHED YOU COULD GO TO SLEEP AND NOT WAKE UP?: NO
6. HAVE YOU EVER DONE ANYTHING, STARTED TO DO ANYTHING, OR PREPARED TO DO ANYTHING TO END YOUR LIFE?: NO

## 2024-11-24 ASSESSMENT — ACTIVITIES OF DAILY LIVING (ADL)
ADLS_ACUITY_SCORE: 0

## 2024-11-24 NOTE — ED TRIAGE NOTES
Pt presents to the ED stating he was diagnosed with covid last Saturday. Pt states he's been wearing a pulse oximeter at home and watching it, and his numbers at night drop to 89-90%. Pt didn't sleep last night because he was afraid he would become hypoxic. Pt states he generally feels well with minimal SOB, headaches, and pain in his left ribs. Pain 2/10. Pt took half course of paxlovid and  states he was better until he started taking it.

## 2024-11-24 NOTE — ED PROVIDER NOTES
Emergency Department Note      History of Present Illness     Chief Complaint   Shortness of Breath      HPI   Manny Pitt is a 54 year old male who presents for evaluation of shortness of breath and COVID infection.  He started having COVID symptoms 8 days ago.  He was seen here for chest pain and shortness of breath and had negative workup including CT PE study.  He was seen shortly again after for a headache and discharged home.  He comes in this morning as he has been noticing that his oxygen has been 89 to 90% overnight.  He was concerned he could not sleep.  He does not feel short of breath.  He has no chest pain.  Has some mild left upper abdominal pain that has had for few days.  No nausea, vomiting, or diarrhea.  No chest pain.  He has had no breathing changes since he left here with his workup.  He was started on Paxlovid but cut a couple days worth and felt worse so he stopped.  He has been taking Mucinex for congestion.  He was also having some calf pain and underwent an ultrasound of his lower extremities and they were negative for DVT.    Independent Historian   None    Review of External Notes   Progress note from clinic dated 11/24/2024 with Dr. Bonilla he was seen for his COVID infection and calf pain and had ultrasound of lower extremities that was negative for DVT.    Past Medical History     Medical History and Problem List   Past Medical History:   Diagnosis Date    Esophageal reflux     Hernia, abdominal     Hypertension     Other malignant neoplasm of skin of lower limb, including hip     Other malignant neoplasm of skin of trunk, except scrotum     Personal history of other disorders of nervous system and sense organs     Unspecified eustachian tube disorder        Medications   Acetaminophen (TYLENOL PO)  amLODIPine (NORVASC) 5 MG tablet  meclizine (ANTIVERT) 25 MG tablet  Niacinamide-Zn-Cu-Wejxwb-Mp-Yn (NICOTINAMIDE) 750-27-2-0.5 MG TABS  pravastatin (PRAVACHOL) 20 MG  "tablet  tamsulosin (FLOMAX) 0.4 MG capsule        Surgical History   Past Surgical History:   Procedure Laterality Date    COLONOSCOPY  2007    COLONOSCOPY  12/26/2012    Procedure: COLONOSCOPY;  Colonoscopy ;  Surgeon: Ranulfo Ramirez MD;  Location:  GI    COLONOSCOPY N/A 12/20/2016    Procedure: COLONOSCOPY;  Surgeon: Paula Johnson MD;  Location:  GI    HERNIA REPAIR      ZZC NONSPECIFIC PROCEDURE      Hernia       Physical Exam     Patient Vitals for the past 24 hrs:   BP Temp Temp src Pulse Resp SpO2 Height Weight   11/24/24 0734 (!) 154/96 97.7  F (36.5  C) Temporal 87 20 96 % 1.88 m (6' 2\") 100.6 kg (221 lb 12.5 oz)     Physical Exam  Constitutional: Well appearing.  HEENT: Atraumatic.  Moist mucous membranes.  Neck: Soft.  Supple.    Cardiac: Regular rate and rhythm.  No murmur or rub.  Respiratory: Clear to auscultation bilaterally.  No respiratory distress.  No wheezing, rhonchi, or rales.  Abdomen: Soft and nontender.  No guarding.  Nondistended.  Musculoskeletal: No edema.  Normal range of motion.  Neurologic: Alert and oriented x3.  Normal tone and bulk.  Normal gait.  Skin: No rashes.  No edema.  Psych: Normal affect.  Normal behavior.            Diagnostics     Lab Results   Labs Ordered and Resulted from Time of ED Arrival to Time of ED Departure - No data to display    Imaging   No orders to display       EKG   ECG taken at ***, ECG read at ***  ***   *** as compared to prior, dated ***/***/***.  Rate *** bpm. MT interval *** ms. QRS duration *** ms. QT/QTc ***/*** ms. P-R-T axes *** *** ***.    Independent Interpretation   {IndependentReview:792308::\"None\"}    ED Course      Medications Administered   Medications - No data to display    Procedures   Procedures     Discussion of Management   {Consults/Care Discussions:219142::\"None\"}    ED Course        Additional Documentation  {EPPAAdditionalPhrase:136037::\"None\"}    Medical Decision Making / Diagnosis     CMS Diagnoses: " "{Sepsis/Septic Shock/Stemi/Stroke:702279::\"None\"}    MIPS       {EPPA MIPS:843362::\"None\"}    MDM   Manny Pitt is a 54 year old male ***    Disposition   {EPPAFV Dispo:255789}    Diagnosis   No diagnosis found.     Discharge Medications   New Prescriptions    No medications on file         Melo Canada MD    "

## 2024-11-24 NOTE — DISCHARGE INSTRUCTIONS
Discharge Instructions  COVID-19    COVID-19 is a viral illness that spreads from person-to-person primarily by droplets when an infected person coughs or sneezes and the droplets are then breathed in by another person.    Symptoms of COVID-19  Many people have no symptoms or mild symptoms.  Symptoms usually appear within a few days after contact with a person with COVID-19.  A mild COVID-19 illness is like a cold and can have fever, cough, sneezing, sore throat, tiredness, headache, and muscle pain. Some patients also have stomach symptoms like nausea, vomiting, or diarrhea.  A moderate COVID-19 illness might include shortness of breath or pneumonia on a chest x-ray.  A severe COVID-19 illness causes significant breathing problems such as low oxygen levels or more serious pneumonia.  Some patients experience loss of taste or smell which is somewhat unique to COVID-19.    What should I do if I test positive?  If you test positive for COVID and have no symptoms, you should take precautions, as described below, for five days.  If you test positive for COVID and have symptoms, you should stay home and away from others. You can go back to normal activities when you are feeling better and have been without a fever (without using medications to treat the fever) for 24 hours. When you return to normal activities you should take precautions, as described below, for five days.  Precautions to prevent the spread of COVID-19  Clean Air. Viruses spread from person to person in the air. Bring fresh air into your home by opening doors or windows or using exhaust fans if possible. Use an air filter. Be outdoors when possible.  Practice good hygiene. Cover your mouth and nose with a tissue when you cough or sneeze. Wash your hands often with soap and water for at least 20 seconds or use an       alcohol-based hand  containing at least 60% alcohol. Avoid touching your face. Clean surfaces such as countertops, handrails,  and doorknobs.  Wear a facemask. Masks both prevent an infected person from spreading the virus and prevent a well person from getting the virus. Cloth masks are good, surgical/disposable masks are better, and respirators (N95) are the best.  Practice physical distancing. Avoid being close to someone with cough or other symptoms. Avoid crowded spaces.   What should I do for myself while I am sick?  Treat your symptoms. You can take Acetaminophen (Tylenol) to treat body aches and fever as needed for comfort. Ibuprofen (Advil or Motrin) can be used as well if you still have symptoms after taking Tylenol. Drink fluids. Rest.  Watch for worsening symptoms such as shortness of breath/difficulty breathing or very severe weakness.  Exercise/Sports in rare cases, COVID could affect your heart in a way that makes exercise or participation in sports dangerous.  If you have a mild COVID illness (fever, cough, sore throat, and similar symptoms but no difficulty breathing or abnormalities of the lung): After your COVID symptoms have resolved, you can return to exercise. If you develop difficulty breathing or chest discomfort with exercise, palpitations (a sensation of your heart racing or skipping), or lightheadedness/passing out, you should contact your doctor/clinic.  If you have more than a mild illness (meaning that you have problems with your breathing or lungs) or if you participate in competitive or strenuous activity or have a history of heart disease: Please see your primary doctor/provider prior to return to activity/competition.    COVID treatments such as antiviral medications are available. They are recommended for those patients who have a risk for developing more severe COVID illness. Age is the biggest risk factor. Risk is increased for adults greater than 50 years old and particularly for adults greater than 65 years. Importantly, the treatments must be started early in the illness (within five days). These  treatments may have been considered today during your visit. If you have other questions, contact your primary doctor/clinic.    You can learn more about COVID treatments from the [facility]:  [email or web address]/meds.[email or web address]            What should I do if I am exposed to COVID?  If you are exposed to COVID, you should monitor for symptoms and test if you develop symptoms. Practicing the precautions discussed above are a good idea, particularly if you plan to be around any person who is at higher risk of COVID complications such as older patients (>65) or people with significant medical problems.            Return to the Emergency Department if:  If you are developing worsening breathing, weakness, or feel worse you should seek medical attention.  If you are uncertain, contact your health care provider/clinic. If you need emergency medical attention, call 911.

## 2024-11-25 LAB
ATRIAL RATE - MUSE: 69 BPM
DIASTOLIC BLOOD PRESSURE - MUSE: NORMAL MMHG
INTERPRETATION ECG - MUSE: NORMAL
P AXIS - MUSE: 48 DEGREES
PR INTERVAL - MUSE: 164 MS
QRS DURATION - MUSE: 96 MS
QT - MUSE: 392 MS
QTC - MUSE: 420 MS
R AXIS - MUSE: -16 DEGREES
SYSTOLIC BLOOD PRESSURE - MUSE: NORMAL MMHG
T AXIS - MUSE: 0 DEGREES
VENTRICULAR RATE- MUSE: 69 BPM

## 2024-11-26 ENCOUNTER — PATIENT OUTREACH (OUTPATIENT)
Dept: CARE COORDINATION | Facility: CLINIC | Age: 54
End: 2024-11-26
Payer: COMMERCIAL

## 2024-11-26 NOTE — LETTER
Manny Pitt  [address] [place] [address]    Dear Manny Pitt,    I am a team member within the Connected Care Resource Center with [facility]. I recently contacted you to ensure you are doing well following a visit within our health system. I also wanted to take this chance to introduce Clinic Care Coordination should you have any interest in this program in the future.    Below is a description of Clinic Care Coordination and how this team can further assist you:       The Clinic Care Coordination team is made up of a Registered Nurse, , Financial Resource Worker, and a Community Health Worker who understand and can help navigate the health care system. The goal of clinic care coordination is to help you manage your health, improve access to care, and achieve optimal health outcomes. They work alongside your provider to assist you in determining your health and social needs, obtain health care and community resources, and provide you with necessary information and education. Clinic Care Coordination can work with you through any barriers and develop a care plan that helps coordinate and strengthen the relationship between you and your care team.    If you wish to connect with the Clinic Care Coordination Team, please let your [facility] Primary Care Provider or Clinic Care Team know and they can place a referral. The Clinic Care Coordination team will then reach out by phone to further support you.    We are focused on providing you with the highest-quality healthcare experience possible.    Sincerely,   Sheree KEENAN  Community Health Worker    Connected Care Resources   [facility] [address] ([phone]).

## 2024-11-26 NOTE — PROGRESS NOTES
Clinic Care Coordination Contact  Community Health Worker Initial Outreach    CHW Initial Information Gathering:  Referral Source: ED Follow-Up  Current living arrangement:: Not Assessed  CHW Additional Questions  If ED/Hospital discharge, follow-up appointment scheduled as recommended?: No  Patient agreeable to assistance with scheduling?: No  Patient declined (specify): Patient expressed that it is not useful at this time. Further indicating that there is another PCP closer to him due to Patient being 200 plus miles away from PCP/[facility] at this time.  Medication changes made following ED/Hospital discharge?: No  MyChart active?: Yes  Patient sent Social Drivers of Health questionnaire?: No    Patient accepts CC: No, Patient expressed no additional support is needed at this time. Patient will be sent Care Coordination introduction letter for future reference.     Sheree Martinez  Community Health Worker    Connected Care Resources   [facility]     *Connected Care Resources Team does NOT   follow patient ongoing. Referrals are identified   based on internal discharge report and the outreach is to ensure   Patient has understanding of their discharge instructions.

## 2024-12-09 ENCOUNTER — APPOINTMENT (OUTPATIENT)
Dept: ULTRASOUND IMAGING | Facility: CLINIC | Age: 54
End: 2024-12-09
Attending: EMERGENCY MEDICINE
Payer: COMMERCIAL

## 2024-12-09 ENCOUNTER — HOSPITAL ENCOUNTER (EMERGENCY)
Facility: CLINIC | Age: 54
Discharge: HOME | End: 2024-12-09
Attending: EMERGENCY MEDICINE | Admitting: EMERGENCY MEDICINE
Payer: COMMERCIAL

## 2024-12-09 ENCOUNTER — APPOINTMENT (OUTPATIENT)
Dept: CT IMAGING | Facility: CLINIC | Age: 54
End: 2024-12-09
Attending: EMERGENCY MEDICINE
Payer: COMMERCIAL

## 2024-12-09 VITALS
BODY MASS INDEX: 28.58 KG/M2 | DIASTOLIC BLOOD PRESSURE: 93 MMHG | HEART RATE: 77 BPM | WEIGHT: 222.66 LBS | SYSTOLIC BLOOD PRESSURE: 135 MMHG | HEIGHT: 74 IN | OXYGEN SATURATION: 97 % | RESPIRATION RATE: 18 BRPM | TEMPERATURE: 98.4 F

## 2024-12-09 DIAGNOSIS — R51.9 ACUTE NONINTRACTABLE HEADACHE, UNSPECIFIED HEADACHE TYPE: ICD-10-CM

## 2024-12-09 DIAGNOSIS — M79.662 PAIN OF LEFT LOWER LEG: ICD-10-CM

## 2024-12-09 LAB
ALBUMIN SERPL BCG-MCNC: 4.3 G/DL (ref 3.5–5.2)
ALP SERPL-CCNC: 79 U/L (ref 40–150)
ALT SERPL W P-5'-P-CCNC: 35 U/L (ref 0–70)
ANION GAP SERPL CALCULATED.3IONS-SCNC: 16 MMOL/L (ref 7–15)
AST SERPL W P-5'-P-CCNC: 25 U/L (ref 0–45)
ATRIAL RATE - MUSE: 70 BPM
BASOPHILS # BLD AUTO: 0 10E3/UL (ref 0–0.2)
BASOPHILS NFR BLD AUTO: 1 %
BILIRUB SERPL-MCNC: 0.3 MG/DL
BUN SERPL-MCNC: 14.7 MG/DL (ref 6–20)
CALCIUM SERPL-MCNC: 9.1 MG/DL (ref 8.8–10.4)
CHLORIDE SERPL-SCNC: 104 MMOL/L (ref 98–107)
CREAT SERPL-MCNC: 0.94 MG/DL (ref 0.67–1.17)
DIASTOLIC BLOOD PRESSURE - MUSE: NORMAL MMHG
EGFRCR SERPLBLD CKD-EPI 2021: >90 ML/MIN/1.73M2
EOSINOPHIL # BLD AUTO: 0.1 10E3/UL (ref 0–0.7)
EOSINOPHIL NFR BLD AUTO: 1 %
ERYTHROCYTE [DISTWIDTH] IN BLOOD BY AUTOMATED COUNT: 12.4 % (ref 10–15)
GLUCOSE SERPL-MCNC: 116 MG/DL (ref 70–99)
HCO3 SERPL-SCNC: 19 MMOL/L (ref 22–29)
HCT VFR BLD AUTO: 49.1 % (ref 40–53)
HGB BLD-MCNC: 16.6 G/DL (ref 13.3–17.7)
IMM GRANULOCYTES # BLD: 0 10E3/UL
IMM GRANULOCYTES NFR BLD: 0 %
INTERPRETATION ECG - MUSE: NORMAL
LYMPHOCYTES # BLD AUTO: 1.8 10E3/UL (ref 0.8–5.3)
LYMPHOCYTES NFR BLD AUTO: 30 %
MCH RBC QN AUTO: 29.2 PG (ref 26.5–33)
MCHC RBC AUTO-ENTMCNC: 33.8 G/DL (ref 31.5–36.5)
MCV RBC AUTO: 86 FL (ref 78–100)
MONOCYTES # BLD AUTO: 0.4 10E3/UL (ref 0–1.3)
MONOCYTES NFR BLD AUTO: 6 %
NEUTROPHILS # BLD AUTO: 3.7 10E3/UL (ref 1.6–8.3)
NEUTROPHILS NFR BLD AUTO: 62 %
NRBC # BLD AUTO: 0 10E3/UL
NRBC BLD AUTO-RTO: 0 /100
P AXIS - MUSE: 50 DEGREES
PLATELET # BLD AUTO: 260 10E3/UL (ref 150–450)
POTASSIUM SERPL-SCNC: 3.8 MMOL/L (ref 3.4–5.3)
PR INTERVAL - MUSE: 162 MS
PROT SERPL-MCNC: 7.7 G/DL (ref 6.4–8.3)
QRS DURATION - MUSE: 94 MS
QT - MUSE: 428 MS
QTC - MUSE: 462 MS
R AXIS - MUSE: -27 DEGREES
RBC # BLD AUTO: 5.69 10E6/UL (ref 4.4–5.9)
SODIUM SERPL-SCNC: 139 MMOL/L (ref 135–145)
SYSTOLIC BLOOD PRESSURE - MUSE: NORMAL MMHG
T AXIS - MUSE: 10 DEGREES
VENTRICULAR RATE- MUSE: 70 BPM
WBC # BLD AUTO: 5.9 10E3/UL (ref 4–11)

## 2024-12-09 PROCEDURE — 80053 COMPREHEN METABOLIC PANEL: CPT | Performed by: EMERGENCY MEDICINE

## 2024-12-09 PROCEDURE — 85004 AUTOMATED DIFF WBC COUNT: CPT | Performed by: EMERGENCY MEDICINE

## 2024-12-09 PROCEDURE — 93971 EXTREMITY STUDY: CPT | Mod: LT

## 2024-12-09 PROCEDURE — 85018 HEMOGLOBIN: CPT | Performed by: EMERGENCY MEDICINE

## 2024-12-09 PROCEDURE — 82040 ASSAY OF SERUM ALBUMIN: CPT | Performed by: EMERGENCY MEDICINE

## 2024-12-09 PROCEDURE — 85025 COMPLETE CBC W/AUTO DIFF WBC: CPT | Performed by: EMERGENCY MEDICINE

## 2024-12-09 PROCEDURE — 82310 ASSAY OF CALCIUM: CPT | Performed by: EMERGENCY MEDICINE

## 2024-12-09 PROCEDURE — 93005 ELECTROCARDIOGRAM TRACING: CPT

## 2024-12-09 PROCEDURE — 70450 CT HEAD/BRAIN W/O DYE: CPT

## 2024-12-09 PROCEDURE — 36415 COLL VENOUS BLD VENIPUNCTURE: CPT | Performed by: EMERGENCY MEDICINE

## 2024-12-09 PROCEDURE — 99285 EMERGENCY DEPT VISIT HI MDM: CPT

## 2024-12-09 ASSESSMENT — ACTIVITIES OF DAILY LIVING (ADL)
ADLS_ACUITY_SCORE: 41
ADLS_ACUITY_SCORE: 41

## 2024-12-09 ASSESSMENT — COLUMBIA-SUICIDE SEVERITY RATING SCALE - C-SSRS
6. HAVE YOU EVER DONE ANYTHING, STARTED TO DO ANYTHING, OR PREPARED TO DO ANYTHING TO END YOUR LIFE?: NO
2. HAVE YOU ACTUALLY HAD ANY THOUGHTS OF KILLING YOURSELF IN THE PAST MONTH?: NO
1. IN THE PAST MONTH, HAVE YOU WISHED YOU WERE DEAD OR WISHED YOU COULD GO TO SLEEP AND NOT WAKE UP?: NO

## 2024-12-09 NOTE — ED PROVIDER NOTES
Emergency Department Note      History of Present Illness     Chief Complaint   Headache        HPI   Manny Pitt is a 54 year old male with a history of hypertension, hyperlipidemia, basal cell carcinoma of skin, and squamous cell carcinoma of face who presents to the ED for a headache. The patient reports waking up at 0700 with a headache from his forehead to the back of his head. He adds that he felt numb across his entire forehead. He took 2 extra strength tylenol at 0830, which mildly helped alleviate his headache. Patient states that he was seen for Covid-19 about 3 weeks ago and had a chest xray. He was also seen 3 weeks ago for left leg pain. He had an ultrasound for this, which showed no concerning results. He notes that his left leg pain has been worse lately, and is worst at the back of his knee. Patient denies any fever. Further denies any history of blood clots. He notes that he recently traveled about 200 miles by car to [place].     Independent Historian   None    Review of External Notes   None    Past Medical History     Medical History and Problem List   Essential hypertension  Benign prostatic hyperplasia  Hemorrhoids  Hyperlipidemia  Onychomycosis  Recurrent basal cell carcinoma of skin  Squamous cell carcinoma of face  Sleep apnea  Esophageal reflux  Abdominal hernia  Eustachian tube disorder    Medications   Norvasc  Antivert  Pravachol  Flomax  Tylenol  Nicotinamide     Surgical History   Past Surgical History:   Procedure Laterality Date    COLONOSCOPY  2007    COLONOSCOPY  12/26/2012    Procedure: COLONOSCOPY;  Colonoscopy ;  Surgeon: Ranulfo Ramirez MD;  Location:  GI    COLONOSCOPY N/A 12/20/2016    Procedure: COLONOSCOPY;  Surgeon: Paula Johnson MD;  Location:  GI    HERNIA REPAIR      ZZ NONSPECIFIC PROCEDURE      Hernia       Physical Exam     Patient Vitals for the past 24 hrs:   BP Temp Temp src Pulse Resp SpO2 Height Weight   12/09/24 1118 (!) 128/100 98.4  " F (36.9  C) Oral 95 18 96 % -- --   12/09/24 1116 -- -- -- -- -- -- 1.88 m (6' 2\") 101 kg (222 lb 10.6 oz)     Physical Exam    HEENT:   Temporal arteries are non-tender.      Oropharynx is moist, without lesions or trismus.  Eyes:   PERRL.  EOMs intact.      No corneal clouding.   NECK:   Supple, no meningismus.       Negative Brudzinski's sign.  CV:    Regular rate and rhythm.    No murmurs, rubs or gallops.    2+ posterior tibial pulse on left    No peripheral edema or unilateral leg swelling  PULM:   Clear to auscultation bilateral.      No respiratory distress.      No stridor or wheezing.  ABD:  Soft, non-tender, non-distended.      No rebound or guarding.  MSK:    LLE     Mild tenderness to the popliteal fossa without palpable defect     No focal bony tenderness     No objective edema  LYMPH:  No cervical lymphadenopathy.  NEURO:  A & O x 3    CN II-XII intact, speech is clear with no aphasia.      Finger to nose within normal limits.  No pronator drift.      Strength is 5/5 in all 4 extremities.  Sensation is intact.      Normal muscular tone, no tremor.  SKIN:   Warm, dry and intact.    PSYCH:   Mood is good and affect is appropriate.      Diagnostics     Lab Results   Labs Ordered and Resulted from Time of ED Arrival to Time of ED Departure   COMPREHENSIVE METABOLIC PANEL - Abnormal       Result Value    Sodium 139      Potassium 3.8      Carbon Dioxide (CO2) 19 (*)     Anion Gap 16 (*)     Urea Nitrogen 14.7      Creatinine 0.94      GFR Estimate >90      Calcium 9.1      Chloride 104      Glucose 116 (*)     Alkaline Phosphatase 79      AST 25      ALT 35      Protein Total 7.7      Albumin 4.3      Bilirubin Total 0.3     CBC WITH PLATELETS AND DIFFERENTIAL    WBC Count 5.9      RBC Count 5.69      Hemoglobin 16.6      Hematocrit 49.1      MCV 86      MCH 29.2      MCHC 33.8      RDW 12.4      Platelet Count 260      % Neutrophils 62      % Lymphocytes 30      % Monocytes 6      % Eosinophils 1      % " Basophils 1      % Immature Granulocytes 0      NRBCs per 100 WBC 0      Absolute Neutrophils 3.7      Absolute Lymphocytes 1.8      Absolute Monocytes 0.4      Absolute Eosinophils 0.1      Absolute Basophils 0.0      Absolute Immature Granulocytes 0.0      Absolute NRBCs 0.0         Imaging   US Lower Extremity Venous Duplex Left   Final Result   IMPRESSION:    Negative for left lower extremity DVT.      MCKENZIE DERAS MD            SYSTEM ID:  [id number]      Head CT w/o contrast   Final Result   IMPRESSION:    1.  Negative for acute intracranial hemorrhage, hydrocephalus or   transcortical infarct. No skull fracture.   2.  Completely opacified left maxillary sinus, possibly chronic.      BRIAN COBIAN DO            SYSTEM ID:  [id number]          EKG   ECG taken at 1203, ECG read at 1216  Normal sinus rhythm  Normal ECG   No significant change as compared to prior, dated 11/24/24.  Rate 70 bpm. ID interval 162 ms. QRS duration 94 ms. QT/QTc 428/462 ms. P-R-T axes 50 -27 10.    Independent Interpretation   CT Head: No intracranial hemorrhage.    ED Course      Medications Administered   Medications - No data to display    Procedures   Procedures     Discussion of Management   None    ED Course   ED Course as of 12/09/24 1546   Mon Dec 09, 2024   1339 I obtained history and examined the patient as noted above.         Additional Documentation  None    Medical Decision Making / Diagnosis     CMS Diagnoses: None    MIPS       None    [facility]     Manny Pitt is a 54 year old male presents with progressively worsening headache this morning which had been improving at the time of evaluation.  CT scan of the head undertaken due to new onset headache which is negative for acute pathology.  He has no features concerning for occult subarachnoid hemorrhage, meningitis, dural sinus thrombosis, glaucoma, temporal arteritis.  Features most consistent with mixed type headache with migraine and tension features.  Supportive  measures indicated.    He also reported atypical left leg pain.  No evidence of arterial insufficiency, soft tissue infection.  Doppler ultrasound negative for DVT.  Evaluation most consistent with myalgias likely related to his recent COVID-19 infection.  Tylenol and ibuprofen as needed for pain.    Disposition   The patient was discharged.     Diagnosis     ICD-10-CM    1. Acute nonintractable headache, unspecified headache type  R51.9       2. Pain of left lower leg  M79.662            Discharge Medications   New Prescriptions    No medications on file         Scribe Disclosure:  I, Sofi Mckinney, am serving as a scribe at 1:41 PM on 12/9/2024 to document services personally performed by Kenn Reeves MD based on my observations and the provider's statements to me.        Kenn Reeves MD  12/09/24 5842

## 2024-12-09 NOTE — ED TRIAGE NOTES
Headache began this morning behind his eyes, took 2 extra strength tylenol which improved pain but patient states it doesn't feel like a normal headache. A&Ox4.

## 2024-12-26 ENCOUNTER — TELEPHONE (OUTPATIENT)
Dept: PEDIATRICS | Facility: CLINIC | Age: 54
End: 2024-12-26
Payer: COMMERCIAL

## 2024-12-26 NOTE — TELEPHONE ENCOUNTER
Mela, patient's  from [facility] calling to request current medication list, last office visit date, next office visit date, most recent colonoscopy, and most recent flu shot.     Fax number [phone] Attn: Mela Elamfany wants patient's PCP to be aware that patient has had 4 ER visits since 10/25/2024. Most recent being 12/9/24.     RN called patient to get verbal consent. Patient gave ok to send above information to Mela via fax. He declines scheduling a hospital follow up visit for his most recent ER trip. He plans to see his PCP at his annual on 3/25/25.    Information faxed as requested.     Aislinn Leyva RN 12/26/2024 9:28 AM  [facility]

## 2024-12-26 NOTE — TELEPHONE ENCOUNTER
[employer] [facility] requesting name of patient's PCP. Informed representative Alejandrina Alegre RN

## 2025-03-20 SDOH — HEALTH STABILITY: PHYSICAL HEALTH: ON AVERAGE, HOW MANY DAYS PER WEEK DO YOU ENGAGE IN MODERATE TO STRENUOUS EXERCISE (LIKE A BRISK WALK)?: 6 DAYS

## 2025-03-20 SDOH — HEALTH STABILITY: PHYSICAL HEALTH: ON AVERAGE, HOW MANY MINUTES DO YOU ENGAGE IN EXERCISE AT THIS LEVEL?: 30 MIN

## 2025-03-20 ASSESSMENT — SOCIAL DETERMINANTS OF HEALTH (SDOH): HOW OFTEN DO YOU GET TOGETHER WITH FRIENDS OR RELATIVES?: ONCE A WEEK

## 2025-03-25 ENCOUNTER — OFFICE VISIT (OUTPATIENT)
Dept: PEDIATRICS | Facility: CLINIC | Age: 55
End: 2025-03-25
Attending: INTERNAL MEDICINE
Payer: COMMERCIAL

## 2025-03-25 VITALS
BODY MASS INDEX: 29.03 KG/M2 | HEIGHT: 73 IN | TEMPERATURE: 98.2 F | HEART RATE: 62 BPM | WEIGHT: 219 LBS | OXYGEN SATURATION: 96 % | SYSTOLIC BLOOD PRESSURE: 112 MMHG | DIASTOLIC BLOOD PRESSURE: 80 MMHG | RESPIRATION RATE: 16 BRPM

## 2025-03-25 DIAGNOSIS — I10 ESSENTIAL HYPERTENSION: ICD-10-CM

## 2025-03-25 DIAGNOSIS — C44.91: ICD-10-CM

## 2025-03-25 DIAGNOSIS — Z63.4 BEREAVEMENT: ICD-10-CM

## 2025-03-25 DIAGNOSIS — C44.320 SCC (SQUAMOUS CELL CARCINOMA), FACE: ICD-10-CM

## 2025-03-25 DIAGNOSIS — Z00.00 ROUTINE GENERAL MEDICAL EXAMINATION AT A HEALTH CARE FACILITY: Primary | ICD-10-CM

## 2025-03-25 DIAGNOSIS — Z12.5 SCREENING FOR PROSTATE CANCER: ICD-10-CM

## 2025-03-25 DIAGNOSIS — N40.1 BENIGN PROSTATIC HYPERPLASIA WITH LOWER URINARY TRACT SYMPTOMS, SYMPTOM DETAILS UNSPECIFIED: ICD-10-CM

## 2025-03-25 DIAGNOSIS — E78.5 HYPERLIPIDEMIA LDL GOAL <160: ICD-10-CM

## 2025-03-25 DIAGNOSIS — G47.30 SLEEP APNEA, UNSPECIFIED TYPE: ICD-10-CM

## 2025-03-25 LAB
ALBUMIN SERPL BCG-MCNC: 4.4 G/DL (ref 3.5–5.2)
ALP SERPL-CCNC: 73 U/L (ref 40–150)
ALT SERPL W P-5'-P-CCNC: 21 U/L (ref 0–70)
ANION GAP SERPL CALCULATED.3IONS-SCNC: 12 MMOL/L (ref 7–15)
AST SERPL W P-5'-P-CCNC: 25 U/L (ref 0–45)
BILIRUB SERPL-MCNC: 0.5 MG/DL
BUN SERPL-MCNC: 14.9 MG/DL (ref 6–20)
CALCIUM SERPL-MCNC: 9.5 MG/DL (ref 8.8–10.4)
CHLORIDE SERPL-SCNC: 107 MMOL/L (ref 98–107)
CHOLEST SERPL-MCNC: 175 MG/DL
CREAT SERPL-MCNC: 1.01 MG/DL (ref 0.67–1.17)
EGFRCR SERPLBLD CKD-EPI 2021: 87 ML/MIN/1.73M2
FASTING STATUS PATIENT QL REPORTED: YES
FASTING STATUS PATIENT QL REPORTED: YES
GLUCOSE SERPL-MCNC: 100 MG/DL (ref 70–99)
HCO3 SERPL-SCNC: 21 MMOL/L (ref 22–29)
HDLC SERPL-MCNC: 45 MG/DL
LDLC SERPL CALC-MCNC: 108 MG/DL
NONHDLC SERPL-MCNC: 130 MG/DL
POTASSIUM SERPL-SCNC: 3.9 MMOL/L (ref 3.4–5.3)
PROT SERPL-MCNC: 7.7 G/DL (ref 6.4–8.3)
PSA SERPL DL<=0.01 NG/ML-MCNC: 2.23 NG/ML (ref 0–3.5)
SODIUM SERPL-SCNC: 140 MMOL/L (ref 135–145)
TRIGL SERPL-MCNC: 110 MG/DL

## 2025-03-25 PROCEDURE — 3074F SYST BP LT 130 MM HG: CPT | Performed by: INTERNAL MEDICINE

## 2025-03-25 PROCEDURE — 1126F AMNT PAIN NOTED NONE PRSNT: CPT | Performed by: INTERNAL MEDICINE

## 2025-03-25 PROCEDURE — 80061 LIPID PANEL: CPT | Performed by: INTERNAL MEDICINE

## 2025-03-25 PROCEDURE — 3079F DIAST BP 80-89 MM HG: CPT | Performed by: INTERNAL MEDICINE

## 2025-03-25 PROCEDURE — 99396 PREV VISIT EST AGE 40-64: CPT | Performed by: INTERNAL MEDICINE

## 2025-03-25 PROCEDURE — 99214 OFFICE O/P EST MOD 30 MIN: CPT | Mod: 25 | Performed by: INTERNAL MEDICINE

## 2025-03-25 PROCEDURE — 36415 COLL VENOUS BLD VENIPUNCTURE: CPT | Performed by: INTERNAL MEDICINE

## 2025-03-25 PROCEDURE — 80053 COMPREHEN METABOLIC PANEL: CPT | Performed by: INTERNAL MEDICINE

## 2025-03-25 PROCEDURE — G0103 PSA SCREENING: HCPCS | Performed by: INTERNAL MEDICINE

## 2025-03-25 RX ORDER — AMLODIPINE BESYLATE 5 MG/1
5 TABLET ORAL DAILY
Qty: 90 TABLET | Refills: 3 | Status: SHIPPED | OUTPATIENT
Start: 2025-03-25

## 2025-03-25 RX ORDER — FLUOROURACIL 50 MG/G
CREAM TOPICAL 2 TIMES DAILY
COMMUNITY

## 2025-03-25 RX ORDER — PRAVASTATIN SODIUM 40 MG
40 TABLET ORAL DAILY
Qty: 90 TABLET | Refills: 3 | Status: SHIPPED | OUTPATIENT
Start: 2025-03-25

## 2025-03-25 SDOH — SOCIAL STABILITY - SOCIAL INSECURITY: DISSAPEARANCE AND DEATH OF FAMILY MEMBER: Z63.4

## 2025-03-25 ASSESSMENT — PAIN SCALES - GENERAL: PAINLEVEL_OUTOF10: NO PAIN (0)

## 2025-03-25 NOTE — PATIENT INSTRUCTIONS
Patient Education   Preventive Care Advice   This is general advice given by our system to help you stay healthy. However, your care team may have specific advice just for you. Please talk to your care team about your preventive care needs.  Nutrition  Eat 5 or more servings of fruits and vegetables each day.  Try wheat bread, brown rice and whole grain pasta (instead of white bread, rice, and pasta).  Get enough calcium and vitamin D. Check the label on foods and aim for 100% of the RDA (recommended daily allowance).  Lifestyle  Exercise at least 150 minutes each week  (30 minutes a day, 5 days a week).  Do muscle strengthening activities 2 days a week. These help control your weight and prevent disease.  No smoking.  Wear sunscreen to prevent skin cancer.  Have a dental exam and cleaning every 6 months.  Yearly exams  See your health care team every year to talk about:  Any changes in your health.  Any medicines your care team has prescribed.  Preventive care, family planning, and ways to prevent chronic diseases.  Shots (vaccines)   HPV shots (up to age 26), if you've never had them before.  Hepatitis B shots (up to age 59), if you've never had them before.  COVID-19 shot: Get this shot when it's due.  Flu shot: Get a flu shot every year.  Tetanus shot: Get a tetanus shot every 10 years.  Pneumococcal, hepatitis A, and RSV shots: Ask your care team if you need these based on your risk.  Shingles shot (for age 50 and up)  General health tests  Diabetes screening:  Starting at age 35, Get screened for diabetes at least every 3 years.  If you are younger than age 35, ask your care team if you should be screened for diabetes.  Cholesterol test: At age 39, start having a cholesterol test every 5 years, or more often if advised.  Bone density scan (DEXA): At age 50, ask your care team if you should have this scan for osteoporosis (brittle bones).  Hepatitis C: Get tested at least once in your life.  STIs (sexually  transmitted infections)  Before age 24: Ask your care team if you should be screened for STIs.  After age 24: Get screened for STIs if you're at risk. You are at risk for STIs (including HIV) if:  You are sexually active with more than one person.  You don't use condoms every time.  You or a partner was diagnosed with a sexually transmitted infection.  If you are at risk for HIV, ask about PrEP medicine to prevent HIV.  Get tested for HIV at least once in your life, whether you are at risk for HIV or not.  Cancer screening tests  Cervical cancer screening: If you have a cervix, begin getting regular cervical cancer screening tests starting at age 21.  Breast cancer scan (mammogram): If you've ever had breasts, begin having regular mammograms starting at age 40. This is a scan to check for breast cancer.  Colon cancer screening: It is important to start screening for colon cancer at age 45.  Have a colonoscopy test every 10 years (or more often if you're at risk) Or, ask your provider about stool tests like a FIT test every year or Cologuard test every 3 years.  To learn more about your testing options, visit:   .  For help making a decision, visit:   https://bit.ly/dv89014.  Prostate cancer screening test: If you have a prostate, ask your care team if a prostate cancer screening test (PSA) at age 55 is right for you.  Lung cancer screening: If you are a current or former smoker ages 50 to 80, ask your care team if ongoing lung cancer screenings are right for you.  For informational purposes only. Not to replace the advice of your health care provider. Copyright   2023 [facility]. All rights reserved. Clinically reviewed by the [employer]. [employer] [id number] - REV 01/24.  Learning About Stress  What is stress?     Stress is your body's response to a hard situation. Your body can have a physical, emotional, or mental response. Stress is a fact of life for most people, and it  affects everyone differently. What causes stress for you may not be stressful for someone else.  A lot of things can cause stress. You may feel stress when you go on a job interview, take a test, or run a race. This kind of short-term stress is normal and even useful. It can help you if you need to work hard or react quickly. For example, stress can help you finish an important job on time.  Long-term stress is caused by ongoing stressful situations or events. Examples of long-term stress include long-term health problems, ongoing problems at work, or conflicts in your family. Long-term stress can harm your health.  How does stress affect your health?  When you are stressed, your body responds as though you are in danger. It makes hormones that speed up your heart, make you breathe faster, and give you a burst of energy. This is called the fight-or-flight stress response. If the stress is over quickly, your body goes back to normal and no harm is done.  But if stress happens too often or lasts too long, it can have bad effects. Long-term stress can make you more likely to get sick, and it can make symptoms of some diseases worse. If you tense up when you are stressed, you may develop neck, shoulder, or low back pain. Stress is linked to high blood pressure and heart disease.  Stress also harms your emotional health. It can make you cook, tense, or depressed. Your relationships may suffer, and you may not do well at work or school.  What can you do to manage stress?  You can try these things to help manage stress:   Do something active. Exercise or activity can help reduce stress. Walking is a great way to get started. Even everyday activities such as housecleaning or yard work can help.  Try yoga or con chi. These techniques combine exercise and meditation. You may need some training at first to learn them.  Do something you enjoy. For example, listen to music or go to a movie. Practice your hobby or do volunteer  "work.  Meditate. This can help you relax, because you are not worrying about what happened before or what may happen in the future.  Do guided imagery. Imagine yourself in any setting that helps you feel calm. You can use online videos, books, or a teacher to guide you.  Do breathing exercises. For example:  From a standing position, bend forward from the waist with your knees slightly bent. Let your arms dangle close to the floor.  Breathe in slowly and deeply as you return to a standing position. Roll up slowly and lift your head last.  Hold your breath for just a few seconds in the standing position.  Breathe out slowly and bend forward from the waist.  Let your feelings out. Talk, laugh, cry, and express anger when you need to. Talking with supportive friends or family, a counselor, or a ora leader about your feelings is a healthy way to relieve stress. Avoid discussing your feelings with people who make you feel worse.  Write. It may help to write about things that are bothering you. This helps you find out how much stress you feel and what is causing it. When you know this, you can find better ways to cope.  What can you do to prevent stress?  You might try some of these things to help prevent stress:  Manage your time. This helps you find time to do the things you want and need to do.  Get enough sleep. Your body recovers from the stresses of the day while you are sleeping.  Get support. Your family, friends, and community can make a difference in how you experience stress.  Limit your news feed. Avoid or limit time on social media or news that may make you feel stressed.  Do something active. Exercise or activity can help reduce stress. Walking is a great way to get started.  Where can you learn more?  Go to https://www.[employer]or[employer].net/patiented  Enter N032 in the search box to learn more about \"Learning About Stress.\"  Current as of: October 24, 2024  Content Version: 14.4 2024-2025 [employer].   Care instructions adapted under license by your healthcare professional. If you have questions about a medical condition or this instruction, always ask your healthcare professional. [employer] disclaims any warranty or liability for your use of this information.

## 2025-03-25 NOTE — PROGRESS NOTES
Preventive Care Visit  [facility] KAM Neff MD, Internal Medicine - Pediatrics  Mar 25, 2025      Assessment & Plan     (Z00.00) Routine general medical examination at a health care facility  (primary encounter diagnosis)    (I10) Essential hypertension  Comment:   Plan: amLODIPine (NORVASC) 5 MG tablet        Adequate control.  Continue amlodipine     (E78.5) Hyperlipidemia LDL goal <160  Comment:   Plan: Lipid panel reflex to direct LDL Non-fasting,         pravastatin (PRAVACHOL) 40 MG tablet,         Comprehensive metabolic panel (BMP + Alb, Alk         Phos, ALT, AST, Total. Bili, TP)           Lipid panel reviewed, increase pravastatin from 20 to 40mg, rtc 2-3mo for follow-up labwork    (C44.320) SCC (squamous cell carcinoma), face  (C44.91) Recurrent basal cell carcinoma of skin  Comment:   Plan: fluorouracil (EFUDEX) 5 % external cream        Managed by dermatology/ [id number] follow-up visits    (N40.1) Benign prostatic hyperplasia with lower urinary tract symptoms, symptom details unspecified  Comment:   Plan: stopped flomax due to side effects. Sx not bothersome.  Discussed daily cialis as option    (G47.30) Sleep apnea, unspecified type  Comment:   Plan: plans to resume cpap    (Z12.5) Screening for prostate cancer  Comment:   Plan: PSA, screen          (Z63.4) Bereavement  Comment:   Plan: mother  unexpectedly in past 6months, declines counseling      Counseling  Appropriate preventive services were addressed with this patient via screening, questionnaire, or discussion as appropriate for fall prevention, nutrition, physical activity, Tobacco-use cessation, social engagement, weight loss and cognition.  Checklist reviewing preventive services available has been given to the patient.  Reviewed patient's diet, addressing concerns and/or questions.   He is at risk for psychosocial distress and has been provided with information to reduce risk.           Subjective   Nic is a 55  year old, presenting for the following:  Physical        3/25/2025     7:00 AM   Additional Questions   Roomed by Domenica Martino   Accompanied by CHARLES SÁNCHEZ           Advance Care Planning  Patient does not have a Health Care Directive: reviewed      3/20/2025   General Health   How would you rate your overall physical health? (!) FAIR   Feel stress (tense, anxious, or unable to sleep) Very much   (!) STRESS CONCERN      3/20/2025   Nutrition   Three or more servings of calcium each day? Yes   Diet: I don't know   How many servings of fruit and vegetables per day? (!) 2-3   How many sweetened beverages each day? (!) 2         3/20/2025   Exercise   Days per week of moderate/strenous exercise 6 days   Average minutes spent exercising at this level 30 min         3/20/2025   Social Factors   Frequency of gathering with friends or relatives Once a week   Worry food won't last until get money to buy more No   Food not last or not have enough money for food? No   Do you have housing? (Housing is defined as stable permanent housing and does not include staying ouside in a car, in a tent, in an abandoned building, in an overnight shelter, or couch-surfing.) No   Are you worried about losing your housing? No   Lack of transportation? No   Unable to get utilities (heat,electricity)? No   Want help with housing or utility concern? No   (!) HOUSING CONCERN PRESENT      3/20/2025   Fall Risk   Fallen 2 or more times in the past year? No   Trouble with walking or balance? No          3/20/2025   Dental   Dentist two times every year? Yes           3/12/2024   TB Screening   Were you born outside of the [place]? No           Today's PHQ-2 Score:       3/25/2025     6:49 AM   PHQ-2 ( 1999 [employer])   Q1: Little interest or pleasure in doing things 1   Q2: Feeling down, depressed or hopeless 0   PHQ-2 Score 1    Q1: Little interest or pleasure in doing things Several days   Q2: Feeling down, depressed or hopeless Not at all   PHQ-2  Score 1       Patient-reported           3/20/2025   Substance Use   Alcohol more than 3/day or more than 7/wk No   Do you use any other substances recreationally? No     Social History     Tobacco Use    Smoking status: Never    Smokeless tobacco: Never   Vaping Use    Vaping status: Never Used   Substance Use Topics    Alcohol use: Yes     Comment: 2-3 drinks per week    Drug use: No           3/20/2025   STI Screening   New sexual partner(s) since last STI/HIV test? No   Last PSA:   PSA   Date Value Ref Range Status   01/11/2021 2.28 0 - 4 ug/L Final     Comment:     Assay Method:  Chemiluminescence using Siemens Vista analyzer     Prostate Specific Antigen Screen   Date Value Ref Range Status   03/19/2024 2.05 0.00 - 3.50 ng/mL Final     ASCVD Risk   The 10-year ASCVD risk score (William CLARK, et al., 2019) is: 7.1%    Values used to calculate the score:      Age: 55 years      Sex: Male      Is Non- : No      Diabetic: No      Tobacco smoker: No      Systolic Blood Pressure: 112 mmHg      Is BP treated: Yes      HDL Cholesterol: 41 mg/dL      Total Cholesterol: 196 mg/dL           Reviewed and updated as needed this visit by Provider                    Patient Active Problem List   Diagnosis    Hyperlipidemia LDL goal <160    Recurrent basal cell carcinoma of skin    Onychomycosis    Hemorrhoids, unspecified hemorrhoid type    Benign prostatic hyperplasia with lower urinary tract symptoms, symptom details unspecified    SCC (squamous cell carcinoma), face    Sleep apnea, unspecified type     Past Surgical History:   Procedure Laterality Date    COLONOSCOPY  2007    COLONOSCOPY  12/26/2012    Procedure: COLONOSCOPY;  Colonoscopy ;  Surgeon: Ranulfo Ramirez MD;  Location:  GI    COLONOSCOPY N/A 12/20/2016    Procedure: COLONOSCOPY;  Surgeon: Paula Johnson MD;  Location:  GI    HERNIA REPAIR      [facility] NONSPECIFIC PROCEDURE      Hernia       Social History     Tobacco Use  "   Smoking status: Never    Smokeless tobacco: Never   Substance Use Topics    Alcohol use: Yes     Comment: 2-3 drinks per week     Family History   Problem Relation Age of Onset    Hypertension Mother     Depression Mother     Anxiety Disorder Mother     Heart Disease Father         anuerysm    Hypertension Father     Cancer Father         lung    Other Cancer Father         Lung    Cancer Maternal Grandmother         basal cell    Cerebrovascular Disease Maternal Grandmother     Cancer Maternal Grandfather         basal cell    Cerebrovascular Disease Paternal Grandmother     Alcohol/Drug Paternal Grandfather     Diabetes No family hx of     Colon Cancer No family hx of          Current Outpatient Medications   Medication Sig Dispense Refill    Acetaminophen (TYLENOL PO)       amLODIPine (NORVASC) 5 MG tablet Take 1 tablet (5 mg) by mouth daily. 90 tablet 3    fluorouracil (EFUDEX) 5 % external cream Apply topically 2 times daily.      meclizine (ANTIVERT) 25 MG tablet Take 1 tablet (25 mg) by mouth 3 times daily as needed (motion sickness) 90 tablet 3    Niacinamide-Zn-Cu-Bauihh-Ul-Kv (NICOTINAMIDE) 750-27-2-0.5 MG TABS Take 1,000 mg by mouth daily      pravastatin (PRAVACHOL) 40 MG tablet Take 1 tablet (40 mg) by mouth daily. 90 tablet 3         Review of Systems  Constitutional, neuro, ENT, endocrine, pulmonary, cardiac, gastrointestinal, genitourinary, musculoskeletal, integument and psychiatric systems are negative, except as otherwise noted.     Objective    Exam  /80 (BP Location: Right arm, Patient Position: Sitting, Cuff Size: Adult Regular)   Pulse 62   Temp 98.2  F (36.8  C) (Temporal)   Resp 16   Ht 1.865 m (6' 1.43\")   Wt 99.3 kg (219 lb)   SpO2 96%   BMI 28.56 kg/m     Estimated body mass index is 28.56 kg/m  as calculated from the following:    Height as of this encounter: 1.865 m (6' 1.43\").    Weight as of this encounter: 99.3 kg (219 lb).    Physical Exam  GENERAL: alert and no " distress  EYES: Eyes grossly normal to inspection, PERRL and conjunctivae and sclerae normal  HENT: ear canals and TM's normal, nose and mouth without ulcers or lesions  NECK: no adenopathy, no asymmetry, masses, or scars  RESP: lungs clear to auscultation - no rales, rhonchi or wheezes  CV: regular rate and rhythm, normal S1 S2, no S3 or S4, no murmur, click or rub, no peripheral edema  ABDOMEN: soft, nontender, no hepatosplenomegaly, no masses and bowel sounds normal  MS: no gross musculoskeletal defects noted, no edema  SKIN: no suspicious lesions or rashes  NEURO: Normal strength and tone, mentation intact and speech normal  PSYCH: mentation appears normal, affect normal/bright      Signed Electronically by: Ranulfo Neff MD

## 2025-06-02 ENCOUNTER — NURSE TRIAGE (OUTPATIENT)
Dept: PEDIATRICS | Facility: CLINIC | Age: 55
End: 2025-06-02

## 2025-06-02 NOTE — TELEPHONE ENCOUNTER
"RN attempted to reach patient. Left voicemail to call back and speak with a nurse.     When patient calls back   - triage symptoms      Per scrotal pain triage protocol:   \"Constant pain in scrotum or testicle and present > 1 hour  R/O: torsion of testis or appendix testis, orchitis, epididymitis    Yes Go to ED Now\"       Conchita CHAWLA RN on 6/2/2025 at 10:14 AM     "

## 2025-06-02 NOTE — TELEPHONE ENCOUNTER
"Nurse Triage SBAR    Is this a 2nd Level Triage? NO    Situation: patient returning call, reporting testicular pain    Background:  all weekend has had testicular pain. States this has happened before and gone away fairly quickly but this time is lasting longer.  is wearing a jock strop but it's not helping much. Denies known cause, hernia, injury.  [place]    Assessment:  pain is to bottom of testicles, rates a constant 1/10. States pain is dull, not sharp. Denies swelling, rash, fever, warmth, numbness, emesis.  has been urinating more frequently but usually voids frequently.  may have a musky smell to urine too.    Protocol Recommended Disposition:   Go to ED Now    Recommendation: per protocol advised patient present to ER where is he due to constant pain. Instructed patient to call [phone] for new or worsening symptoms or further questions. Patient verbalized understanding and agrees with the plan.      Reason for Disposition   Constant pain in scrotum or testicle and present > 1 hour    Additional Information   Negative: Rash or color change of scrotum BUT no swelling or pain   Negative: Inguinal hernia pain (e.g., known hernia previously diagnosed by a doctor or NP/PA)   Negative: Followed a genital area injury (e.g., penis, scrotum)   Negative: Swelling of scrotum is main symptom   Negative: SEVERE pain (e.g., excruciating)   Negative: Swollen scrotum    Answer Assessment - Initial Assessment Questions  1. LOCATION and RADIATION: \"Where is the pain located?\"       Whole bottom area  2. QUALITY: \"What does the pain feel like?\"  (e.g., sharp, dull, aching, burning)      Dull pain, no sharp  3. SEVERITY: \"How bad is the pain?\"  (Scale 1-10; or mild, moderate, severe)      Continuous 1/10  4. ONSET: \"When did the pain start?\"      Friday  5. PATTERN: \"Does it come and go, or has it been constant since it started?\"      constant  6. SCROTAL " "APPEARANCE: \"What does the scrotum look like?\" \"Is there any swelling or redness?\"       No change  7. HERNIA: \"Has a doctor ever told you that you have a hernia?\"      No  8. OTHER SYMPTOMS: \"Do you have any other symptoms?\" (e.g., abdomen pain, difficulty passing urine, fever, vomiting)      No fever; urinary frequency, musky UA smell    Protocols used: Scrotum Pain-A-[place]  Olivia Baxter RN   "

## 2025-06-08 ENCOUNTER — RESULTS FOLLOW-UP (OUTPATIENT)
Dept: PEDIATRICS | Facility: CLINIC | Age: 55
End: 2025-06-08
Payer: COMMERCIAL

## 2025-06-09 ENCOUNTER — OFFICE VISIT (OUTPATIENT)
Dept: PEDIATRICS | Facility: CLINIC | Age: 55
End: 2025-06-09
Payer: COMMERCIAL

## 2025-06-09 ENCOUNTER — RESULTS FOLLOW-UP (OUTPATIENT)
Dept: PEDIATRICS | Facility: CLINIC | Age: 55
End: 2025-06-09

## 2025-06-09 ENCOUNTER — NURSE TRIAGE (OUTPATIENT)
Dept: PEDIATRICS | Facility: CLINIC | Age: 55
End: 2025-06-09

## 2025-06-09 VITALS
OXYGEN SATURATION: 96 % | SYSTOLIC BLOOD PRESSURE: 135 MMHG | DIASTOLIC BLOOD PRESSURE: 80 MMHG | BODY MASS INDEX: 28.47 KG/M2 | HEART RATE: 79 BPM | RESPIRATION RATE: 16 BRPM | WEIGHT: 221.8 LBS | TEMPERATURE: 97.3 F | HEIGHT: 74 IN

## 2025-06-09 DIAGNOSIS — N50.811 PAIN IN BOTH TESTICLES: ICD-10-CM

## 2025-06-09 DIAGNOSIS — R39.15 URINARY URGENCY: ICD-10-CM

## 2025-06-09 DIAGNOSIS — N50.812 PAIN IN BOTH TESTICLES: ICD-10-CM

## 2025-06-09 DIAGNOSIS — R35.0 URINARY FREQUENCY: ICD-10-CM

## 2025-06-09 DIAGNOSIS — R11.0 NAUSEA: Primary | ICD-10-CM

## 2025-06-09 DIAGNOSIS — I10 ESSENTIAL HYPERTENSION: ICD-10-CM

## 2025-06-09 LAB
ALBUMIN UR-MCNC: NEGATIVE MG/DL
ANION GAP SERPL CALCULATED.3IONS-SCNC: 11 MMOL/L (ref 7–15)
APPEARANCE UR: CLEAR
BILIRUB UR QL STRIP: NEGATIVE
BUN SERPL-MCNC: 16.3 MG/DL (ref 6–20)
C TRACH DNA SPEC QL NAA+PROBE: NEGATIVE
CALCIUM SERPL-MCNC: 9.5 MG/DL (ref 8.8–10.4)
CHLORIDE SERPL-SCNC: 104 MMOL/L (ref 98–107)
COLOR UR AUTO: YELLOW
CREAT SERPL-MCNC: 0.92 MG/DL (ref 0.67–1.17)
EGFRCR SERPLBLD CKD-EPI 2021: >90 ML/MIN/1.73M2
ERYTHROCYTE [DISTWIDTH] IN BLOOD BY AUTOMATED COUNT: 12.5 % (ref 10–15)
GLUCOSE SERPL-MCNC: 95 MG/DL (ref 70–99)
GLUCOSE UR STRIP-MCNC: NEGATIVE MG/DL
HCO3 SERPL-SCNC: 23 MMOL/L (ref 22–29)
HCT VFR BLD AUTO: 49.9 % (ref 40–53)
HGB BLD-MCNC: 16.9 G/DL (ref 13.3–17.7)
HGB UR QL STRIP: ABNORMAL
KETONES UR STRIP-MCNC: NEGATIVE MG/DL
LEUKOCYTE ESTERASE UR QL STRIP: NEGATIVE
MCH RBC QN AUTO: 29.3 PG (ref 26.5–33)
MCHC RBC AUTO-ENTMCNC: 33.9 G/DL (ref 31.5–36.5)
MCV RBC AUTO: 87 FL (ref 78–100)
N GONORRHOEA DNA SPEC QL NAA+PROBE: NEGATIVE
NITRATE UR QL: NEGATIVE
PH UR STRIP: 5.5 [PH] (ref 5–7)
PLATELET # BLD AUTO: 266 10E3/UL (ref 150–450)
POTASSIUM SERPL-SCNC: 4 MMOL/L (ref 3.4–5.3)
RBC # BLD AUTO: 5.76 10E6/UL (ref 4.4–5.9)
RBC #/AREA URNS AUTO: NORMAL /HPF
SODIUM SERPL-SCNC: 138 MMOL/L (ref 135–145)
SP GR UR STRIP: 1.01 (ref 1–1.03)
SPECIMEN TYPE: NORMAL
SPECIMEN TYPE: NORMAL
UROBILINOGEN UR STRIP-ACNC: 0.2 E.U./DL
WBC # BLD AUTO: 6 10E3/UL (ref 4–11)
WBC #/AREA URNS AUTO: NORMAL /HPF

## 2025-06-09 PROCEDURE — 87591 N.GONORRHOEAE DNA AMP PROB: CPT | Performed by: NURSE PRACTITIONER

## 2025-06-09 PROCEDURE — 99214 OFFICE O/P EST MOD 30 MIN: CPT | Performed by: NURSE PRACTITIONER

## 2025-06-09 PROCEDURE — 87491 CHLMYD TRACH DNA AMP PROBE: CPT | Performed by: NURSE PRACTITIONER

## 2025-06-09 PROCEDURE — 80048 BASIC METABOLIC PNL TOTAL CA: CPT | Performed by: NURSE PRACTITIONER

## 2025-06-09 PROCEDURE — 3079F DIAST BP 80-89 MM HG: CPT | Performed by: NURSE PRACTITIONER

## 2025-06-09 PROCEDURE — 81001 URINALYSIS AUTO W/SCOPE: CPT | Performed by: NURSE PRACTITIONER

## 2025-06-09 PROCEDURE — 3075F SYST BP GE 130 - 139MM HG: CPT | Performed by: NURSE PRACTITIONER

## 2025-06-09 PROCEDURE — 1125F AMNT PAIN NOTED PAIN PRSNT: CPT | Performed by: NURSE PRACTITIONER

## 2025-06-09 PROCEDURE — G2211 COMPLEX E/M VISIT ADD ON: HCPCS | Performed by: NURSE PRACTITIONER

## 2025-06-09 PROCEDURE — 36415 COLL VENOUS BLD VENIPUNCTURE: CPT | Performed by: NURSE PRACTITIONER

## 2025-06-09 PROCEDURE — 85027 COMPLETE CBC AUTOMATED: CPT | Performed by: NURSE PRACTITIONER

## 2025-06-09 ASSESSMENT — PAIN SCALES - GENERAL: PAINLEVEL_OUTOF10: MILD PAIN (2)

## 2025-06-09 NOTE — TELEPHONE ENCOUNTER
"S-(situation): RN received call from patient. Patient mainly concerned with change in urinary urgency in past couple days.     B-(background): Patient states he has had issues with urinary urgency in the past, last November when he had Covid, but patient has noticed a worsening in this symptom in the past few days. Patient notes he had a medication change, increase of pravastatin from 20mg to 40mg during Office visit w/ PCP 3/25/25. Patient notes he thought his kidneys and liver would be checked after making this medication change (last comprehensive metabolic panel was 3/25/25).     Of note, patient was seen in ED 6/2/25 for anxiety and testicular pain. Patient is wondering if testicular pain is related to pravastatin. Patient continues to have testicular pain off and on, no pain currently, patient taking aleve for this pain (last took last night).     Patient was seen for Office visit 6/6/26 r/t HTN, amlodipine was increased from 5mg to 10mg daily.     A-(assessment): Patient noticing \"bubbles in urine\", mild nausea, and urinary urgency. Patient noticed these symptoms started a few days ago. No pain with urination. No fever. No known blood in urine. No abdominal pain now, did have lower left and right sided abdominal pain yesterday, wondered if gas pain. Patient notice foul odor to urine prior to 6/2/25 ED visit, no foul odor now.     R-(recommendations): Due to new/change in urinary symptom, patient was advise visit today. Scheduled for Office visit 6/9/25 in timeframe that worked for patient schedule.       Reason for Disposition   Urinating more frequently than usual (i.e., frequency) OR new-onset of the feeling of an urgent need to urinate (i.e., urgency)    Additional Information   Negative: Unable to urinate (or only a few drops) > 4 hours and bladder feels very full (e.g., palpable bladder or strong urge to urinate)   Negative: Decreased urination and drinking very little and dehydration suspected (e.g., " "dark urine, no urine > 12 hours, very dry mouth, very lightheaded)   Negative: Patient sounds very sick or weak to the triager   Negative: Shock suspected (e.g., cold/pale/clammy skin, too weak to stand, low BP, rapid pulse)   Negative: Sounds like a life-threatening emergency to the triager   Negative: Fever > 100.4 F  (38.0 C)   Negative: Bad or foul-smelling urine   Negative: Side (flank) or lower back pain present    Answer Assessment - Initial Assessment Questions  1. SYMPTOM: \"What's the main symptom you're concerned about?\" (e.g., frequency, incontinence)      urgency  2. ONSET: \"When did the  urgency  start?\"      Noticed last few days  3. PAIN: \"Is there any pain?\" If Yes, ask: \"How bad is it?\" (Scale: 1-10; mild, moderate, severe)      No pain with urination  4. CAUSE: \"What do you think is causing the symptoms?\"      unsure  5. OTHER SYMPTOMS: \"Do you have any other symptoms?\" (e.g., blood in urine, fever, flank pain, pain with urination)      no  6. PREGNANCY: \"Is there any chance you are pregnant?\" \"When was your last menstrual period?\"      no    Protocols used: Urinary Symptoms-ANTHONY MIMS RN 6/9/2025 at 7:34 AM    "

## 2025-06-09 NOTE — PROGRESS NOTES
Assessment & Plan     Nausea  Pain in both testicles  Urinary urgency  Urinary frequency  Pt was seen in the ER x2 for these problems. Symptoms are intermittent and has reassuring work-up in ER with negative abd CT, UA, and scrotal US without acute findings. Will repeat labs today.  Did consider prostatitis as cause but he does not appears acutely ill and duration not long enough to be considered chronic prostatitis, refer to urology if symptoms do not continue to improve.  - Adult Urology  Referral; Future  - Basic metabolic panel  (Ca, Cl, CO2, Creat, Gluc, K, Na, BUN); Future  - CBC with platelets; Future  - Basic metabolic panel  (Ca, Cl, CO2, Creat, Gluc, K, Na, BUN); Future  - NEISSERIA GONORRHOEA PCR; Future  - CHLAMYDIA TRACHOMATIS PCR; Future  - CBC with platelets  - Basic metabolic panel  (Ca, Cl, CO2, Creat, Gluc, K, Na, BUN)  - NEISSERIA GONORRHOEA PCR  - CHLAMYDIA TRACHOMATIS PCR    Essential hypertension  BP stable today. He did not increase amlodipine dose, ok to continue and monitor. Parameters given.    The longitudinal plan of care for the diagnosis(es)/condition(s) as documented were addressed during this visit. Due to the added complexity in care, I will continue to support Nic in the subsequent management and with ongoing continuity of care in partnership with PCP            Subjective   Nic is a 55 year old, presenting for the following health issues:  Urinary Problem (Urinary urgency )      6/9/2025     9:12 AM   Additional Questions   Roomed by Shilpi Garner MA     History of Present Illness       CKD: He uses over the counter pain medication, including Tulenol, aleve, two times daily.    Hyperlipidemia:  He presents for follow up of hyperlipidemia.   He is taking medication to lower cholesterol. He is having myalgia or other side effects to statin medications.    Hypertension: He presents for follow up of hypertension.  He does not check blood pressure  regularly outside of the  "clinic. Outside blood pressures have been over 140/90. He does not follow a low salt diet.     He eats 2-3 servings of fruits and vegetables daily.He consumes 2 sweetened beverage(s) daily.He exercises with enough effort to increase his heart rate 20 to 29 minutes per day.  He exercises with enough effort to increase his heart rate 5 days per week. He is missing 1 dose(s) of medications per week.  He is not taking prescribed medications regularly due to remembering to take.        Pt had x2 visits to ER on 6/2 for testicular pain and anxiety  Normal UA  Normal CT scan and scrotal US  BP was elevated, since then was told he can increase amlodipine to 10 mg but hasn't done yet    He does feel some of his symptoms are improving, seem to wax and wane  Intermittent R and L sided testicular pain. None today.  Slight nausea, no vomiting   Had lower abd pain that resolved  Intermittent upper abd pain, none now  Denies rectal or perineal pain, dysuria, abd pain, hematuria, fever, chills,  Urine was \"Bubbly\" this AM          Objective    /80 (BP Location: Right arm, Patient Position: Sitting, Cuff Size: Adult Regular)   Pulse 79   Temp 97.3  F (36.3  C) (Temporal)   Resp 16   Ht 1.867 m (6' 1.5\")   Wt 100.6 kg (221 lb 12.8 oz)   SpO2 96%   BMI 28.87 kg/m    Body mass index is 28.87 kg/m .  Physical Exam   GENERAL: alert and no distress  PSYCH: mentation appears normal, affect normal/bright            Signed Electronically by: Elham Kim NP    "

## 2025-06-10 ENCOUNTER — PATIENT OUTREACH (OUTPATIENT)
Dept: CARE COORDINATION | Facility: CLINIC | Age: 55
End: 2025-06-10
Payer: COMMERCIAL

## 2025-06-12 ENCOUNTER — PATIENT OUTREACH (OUTPATIENT)
Dept: CARE COORDINATION | Facility: CLINIC | Age: 55
End: 2025-06-12
Payer: COMMERCIAL

## 2025-06-20 ENCOUNTER — DOCUMENTATION ONLY (OUTPATIENT)
Dept: SLEEP MEDICINE | Facility: CLINIC | Age: 55
End: 2025-06-20
Payer: COMMERCIAL

## 2025-06-20 DIAGNOSIS — G47.33 OBSTRUCTIVE SLEEP APNEA (ADULT) (PEDIATRIC): Primary | ICD-10-CM

## 2025-06-26 ENCOUNTER — MYC MEDICAL ADVICE (OUTPATIENT)
Dept: PEDIATRICS | Facility: CLINIC | Age: 55
End: 2025-06-26
Payer: COMMERCIAL

## 2025-06-26 NOTE — TELEPHONE ENCOUNTER
RN called patient. He denies worsening of symptoms. He reports he feels better than he did at OV on 6/9/25.     He reports he is not sleeping well and is tired. He was under the impression that liver tests would be done on 6/9/25. Reviewed that a CMP was done on 3/25/25 and liver enzymes were normal at that time. BMP was done at appointment on 6/9/25. Recommend if he continues to be more tired than usual to schedule a follow up appointment. He verbalized understanding.     Aislinn Leyva RN   [facility]

## 2025-06-30 ENCOUNTER — RESULTS FOLLOW-UP (OUTPATIENT)
Dept: INTERNAL MEDICINE | Facility: CLINIC | Age: 55
End: 2025-06-30

## 2025-06-30 ENCOUNTER — OFFICE VISIT (OUTPATIENT)
Dept: INTERNAL MEDICINE | Facility: CLINIC | Age: 55
End: 2025-06-30
Payer: COMMERCIAL

## 2025-06-30 VITALS
HEIGHT: 74 IN | SYSTOLIC BLOOD PRESSURE: 144 MMHG | DIASTOLIC BLOOD PRESSURE: 88 MMHG | WEIGHT: 223.4 LBS | OXYGEN SATURATION: 97 % | HEART RATE: 78 BPM | BODY MASS INDEX: 28.67 KG/M2 | TEMPERATURE: 97.7 F

## 2025-06-30 DIAGNOSIS — E78.5 HYPERLIPIDEMIA LDL GOAL <160: Primary | ICD-10-CM

## 2025-06-30 DIAGNOSIS — M94.0 COSTOCHONDRITIS: ICD-10-CM

## 2025-06-30 LAB
ALBUMIN SERPL BCG-MCNC: 4.2 G/DL (ref 3.5–5.2)
ALP SERPL-CCNC: 70 U/L (ref 40–150)
ALT SERPL W P-5'-P-CCNC: 27 U/L (ref 0–70)
AST SERPL W P-5'-P-CCNC: 26 U/L (ref 0–45)
BILIRUB SERPL-MCNC: 0.5 MG/DL
BILIRUBIN DIRECT (ROCHE PRO & PURE): 0.16 MG/DL (ref 0–0.45)
PROT SERPL-MCNC: 7.5 G/DL (ref 6.4–8.3)

## 2025-06-30 PROCEDURE — 3079F DIAST BP 80-89 MM HG: CPT | Performed by: PHYSICIAN ASSISTANT

## 2025-06-30 PROCEDURE — 80076 HEPATIC FUNCTION PANEL: CPT | Performed by: PHYSICIAN ASSISTANT

## 2025-06-30 PROCEDURE — 3077F SYST BP >= 140 MM HG: CPT | Performed by: PHYSICIAN ASSISTANT

## 2025-06-30 PROCEDURE — 99213 OFFICE O/P EST LOW 20 MIN: CPT | Performed by: PHYSICIAN ASSISTANT

## 2025-06-30 PROCEDURE — 36415 COLL VENOUS BLD VENIPUNCTURE: CPT | Performed by: PHYSICIAN ASSISTANT

## 2025-06-30 ASSESSMENT — ENCOUNTER SYMPTOMS: DIARRHEA: 1

## 2025-06-30 NOTE — PROGRESS NOTES
Assessment & Plan     Hyperlipidemia LDL goal <160  Concerns about LFT check  Had dosage change on lipid medication in march  Lab ordered   - Hepatic panel (Albumin, ALT, AST, Bili, Alk Phos, TP); Future    Costochondritis  Reassurance given   OTC heat and ice reviewed  Patient instructions     Will notify of results via my chart            Subjective   Nic is a 55 year old, presenting for the following health issues:  Abdominal Pain and Diarrhea  Pt dosage on his cholesterol medication has changed, thinks his symptoms might be because of that  Diarrhea    History of Present Illness       Reason for visit:  Upper abdominal pain and discomfort, diarrhea, vomitting  Symptom onset:  3-7 days ago  Symptoms include:  Abdominal discomfort  Symptom intensity:  Mild  Symptom progression:  Worsening  Had these symptoms before:  Yes  What makes it worse:  Moving around  What makes it better:  Nothing He is missing 1 dose(s) of medications per week.  He is not taking prescribed medications regularly due to side effects.          Abdominal Pain    Duration: 1 week  Initially with one day of diarrhea, loose stool now  One x vomiting - after eating, no vomiting since  Nausea is better,   Upper right side pain - points to ribs.     Description (location/character/radiation): as above        Associated flank pain: None  Intensity:  mild, moderate  Accompanying signs and symptoms:        Fever/Chills: no        Gas/Bloating: no        Nausea/vomitting: YES- as above        Diarrhea: YES       Dysuria or Hematuria: no   History (previous similar pain/trauma/previous testing): symptoms started the one month ago   Seen in ED - see [facility] care everywhere, CT scan abd and pelvis and labs - no source of issues  Follow up of [place],   Precipitating or alleviating factors:       Pain worse with eating/BM/urination: no       Pain relieved by BM: no   Therapies tried and outcome: None  LMP:  not applicable     Reviewed CT and lab from outside  "ER  Reviewed notes from visit at [facility]               Review of Systems  Constitutional, HEENT, cardiovascular, pulmonary, gi and gu systems are negative, except as otherwise noted.      Objective    BP (!) 144/88   Pulse 78   Temp 97.7  F (36.5  C) (Temporal)   Ht 1.867 m (6' 1.5\")   Wt 101.3 kg (223 lb 6.4 oz)   SpO2 97%   BMI 29.07 kg/m    Body mass index is 29.07 kg/m .  Physical Exam   GENERAL: alert and no distress  RESP: lungs clear to auscultation - no rales, rhonchi or wheezes  CV: regular rate and rhythm, normal S1 S2, no S3 or S4, no murmur, click or rub, no peripheral edema  ABDOMEN: soft, nontender, without hepatosplenomegaly or masses  MS: tenderness right lower rib cage     Office Visit on 06/09/2025   Component Date Value Ref Range Status    Color Urine 06/09/2025 Yellow  Colorless, Straw, Light Yellow, Yellow Final    Appearance Urine 06/09/2025 Clear  Clear Final    Glucose Urine 06/09/2025 Negative  Negative mg/dL Final    Bilirubin Urine 06/09/2025 Negative  Negative Final    Ketones Urine 06/09/2025 Negative  Negative mg/dL Final    Specific Gravity Urine 06/09/2025 1.015  1.003 - 1.035 Final    Blood Urine 06/09/2025 Trace (A)  Negative Final    pH Urine 06/09/2025 5.5  5.0 - 7.0 Final    Protein Albumin Urine 06/09/2025 Negative  Negative mg/dL Final    Urobilinogen Urine 06/09/2025 0.2  0.2, 1.0 E.U./dL Final    Nitrite Urine 06/09/2025 Negative  Negative Final    Leukocyte Esterase Urine 06/09/2025 Negative  Negative Final    WBC Count 06/09/2025 6.0  4.0 - 11.0 10e3/uL Final    RBC Count 06/09/2025 5.76  4.40 - 5.90 10e6/uL Final    Hemoglobin 06/09/2025 16.9  13.3 - 17.7 g/dL Final    Hematocrit 06/09/2025 49.9  40.0 - 53.0 % Final    MCV 06/09/2025 87  78 - 100 fL Final    MCH 06/09/2025 29.3  26.5 - 33.0 pg Final    MCHC 06/09/2025 33.9  31.5 - 36.5 g/dL Final    RDW 06/09/2025 12.5  10.0 - 15.0 % Final    Platelet Count 06/09/2025 266  150 - 450 10e3/uL Final    Sodium " 06/09/2025 138  135 - 145 mmol/L Final    Potassium 06/09/2025 4.0  3.4 - 5.3 mmol/L Final    Chloride 06/09/2025 104  98 - 107 mmol/L Final    Carbon Dioxide (CO2) 06/09/2025 23  22 - 29 mmol/L Final    Anion Gap 06/09/2025 11  7 - 15 mmol/L Final    Urea Nitrogen 06/09/2025 16.3  6.0 - 20.0 mg/dL Final    Creatinine 06/09/2025 0.92  0.67 - 1.17 mg/dL Final    GFR Estimate 06/09/2025 >90  >60 mL/min/1.73m2 Final    eGFR calculated using 2021 CKD-EPI equation.    Calcium 06/09/2025 9.5  8.8 - 10.4 mg/dL Final    Glucose 06/09/2025 95  70 - 99 mg/dL Final    Neisseria gonorrhoeae 06/09/2025 Negative  Negative Final    Negative for N. gonorrhoeae rRNA by transcription mediated amplification. A negative result by transcription mediated amplification does not preclude the presence of C. trachomatis infection because results are dependent on proper and adequate collection, absence of inhibitors and sufficient rRNA to be detected.    Neisseria gonorrhoeae Specimen [place]* 06/09/2025 Urine, Voided   Final    Chlamydia trachomatis 06/09/2025 Negative  Negative Final    A negative result by transcription mediated amplification does not preclude the presence of C. trachomatis infection because results are dependent on proper and adequate collection, absence of inhibitors and sufficient rRNA to be detected.    Chlamydia trachomatis Specimen [place]* 06/09/2025 Urine, Voided   Final    RBC Urine 06/09/2025 0-2  0-2 /HPF /HPF Final    WBC Urine 06/09/2025 0-5  0-5 /HPF /HPF Final           Signed Electronically by: Lorri Bro PA-C

## 2025-08-12 ENCOUNTER — ANCILLARY PROCEDURE (OUTPATIENT)
Dept: GENERAL RADIOLOGY | Facility: CLINIC | Age: 55
End: 2025-08-12
Attending: PHYSICIAN ASSISTANT
Payer: COMMERCIAL

## 2025-08-12 ENCOUNTER — OFFICE VISIT (OUTPATIENT)
Dept: INTERNAL MEDICINE | Facility: CLINIC | Age: 55
End: 2025-08-12
Payer: COMMERCIAL

## 2025-08-12 VITALS
HEART RATE: 91 BPM | BODY MASS INDEX: 28.53 KG/M2 | DIASTOLIC BLOOD PRESSURE: 90 MMHG | WEIGHT: 222.3 LBS | SYSTOLIC BLOOD PRESSURE: 132 MMHG | TEMPERATURE: 98.5 F | OXYGEN SATURATION: 96 % | HEIGHT: 74 IN

## 2025-08-12 DIAGNOSIS — S99.921A TOE INJURY, RIGHT, INITIAL ENCOUNTER: ICD-10-CM

## 2025-08-12 DIAGNOSIS — R68.84 JAW PAIN: ICD-10-CM

## 2025-08-12 DIAGNOSIS — S99.921A TOE INJURY, RIGHT, INITIAL ENCOUNTER: Primary | ICD-10-CM

## 2025-08-12 PROCEDURE — 3080F DIAST BP >= 90 MM HG: CPT | Performed by: PHYSICIAN ASSISTANT

## 2025-08-12 PROCEDURE — 3075F SYST BP GE 130 - 139MM HG: CPT | Performed by: PHYSICIAN ASSISTANT

## 2025-08-12 PROCEDURE — 99213 OFFICE O/P EST LOW 20 MIN: CPT | Performed by: PHYSICIAN ASSISTANT

## 2025-08-12 PROCEDURE — 73660 X-RAY EXAM OF TOE(S): CPT | Mod: TC | Performed by: STUDENT IN AN ORGANIZED HEALTH CARE EDUCATION/TRAINING PROGRAM

## 2025-08-12 RX ORDER — AMOXICILLIN 500 MG/1
500 TABLET, FILM COATED ORAL 4 TIMES DAILY
COMMUNITY
Start: 2025-08-07